# Patient Record
Sex: FEMALE | Race: BLACK OR AFRICAN AMERICAN | Employment: OTHER | URBAN - METROPOLITAN AREA
[De-identification: names, ages, dates, MRNs, and addresses within clinical notes are randomized per-mention and may not be internally consistent; named-entity substitution may affect disease eponyms.]

---

## 2017-01-13 PROCEDURE — 86160 COMPLEMENT ANTIGEN: CPT | Performed by: INTERNAL MEDICINE

## 2017-01-13 PROCEDURE — 82570 ASSAY OF URINE CREATININE: CPT | Performed by: INTERNAL MEDICINE

## 2017-01-13 PROCEDURE — 81001 URINALYSIS AUTO W/SCOPE: CPT | Performed by: INTERNAL MEDICINE

## 2017-01-13 PROCEDURE — 86225 DNA ANTIBODY NATIVE: CPT | Performed by: INTERNAL MEDICINE

## 2017-01-13 PROCEDURE — 87086 URINE CULTURE/COLONY COUNT: CPT | Performed by: INTERNAL MEDICINE

## 2017-01-13 PROCEDURE — 84156 ASSAY OF PROTEIN URINE: CPT | Performed by: INTERNAL MEDICINE

## 2017-04-10 PROCEDURE — 86160 COMPLEMENT ANTIGEN: CPT | Performed by: INTERNAL MEDICINE

## 2017-04-10 PROCEDURE — 86225 DNA ANTIBODY NATIVE: CPT | Performed by: INTERNAL MEDICINE

## 2017-04-10 PROCEDURE — 81001 URINALYSIS AUTO W/SCOPE: CPT | Performed by: INTERNAL MEDICINE

## 2017-04-10 PROCEDURE — 87086 URINE CULTURE/COLONY COUNT: CPT | Performed by: INTERNAL MEDICINE

## 2017-04-10 PROCEDURE — 84156 ASSAY OF PROTEIN URINE: CPT | Performed by: INTERNAL MEDICINE

## 2017-04-10 PROCEDURE — 82570 ASSAY OF URINE CREATININE: CPT | Performed by: INTERNAL MEDICINE

## 2017-05-22 ENCOUNTER — HOSPITAL ENCOUNTER (OUTPATIENT)
Dept: MAMMOGRAPHY | Facility: HOSPITAL | Age: 67
Discharge: HOME | End: 2017-05-22
Attending: SURGERY
Payer: COMMERCIAL

## 2017-05-22 DIAGNOSIS — C50.919 MALIGNANT NEOPLASM OF FEMALE BREAST (HCC): ICD-10-CM

## 2017-05-22 PROCEDURE — 77061 BREAST TOMOSYNTHESIS UNI: CPT | Performed by: SURGERY

## 2017-05-22 PROCEDURE — 77065 DX MAMMO INCL CAD UNI: CPT | Performed by: SURGERY

## 2017-05-31 ENCOUNTER — PRIOR ORIGINAL RECORDS (OUTPATIENT)
Dept: OTHER | Age: 67
End: 2017-05-31

## 2017-06-29 ENCOUNTER — APPOINTMENT (OUTPATIENT)
Dept: LAB | Facility: HOSPITAL | Age: 67
End: 2017-06-29
Payer: COMMERCIAL

## 2017-06-29 ENCOUNTER — LABORATORY ENCOUNTER (OUTPATIENT)
Dept: LAB | Facility: HOSPITAL | Age: 67
End: 2017-06-29
Payer: COMMERCIAL

## 2017-06-29 DIAGNOSIS — R13.19 ESOPHAGEAL DYSPHAGIA: ICD-10-CM

## 2017-06-29 LAB
CHLORIDE: 110 MMOL/L (ref 101–111)
CO2: 27 MMOL/L (ref 22–32)
POTASSIUM SERPL-SCNC: 3.8 MMOL/L (ref 3.6–5.1)
SODIUM SERPL-SCNC: 143 MMOL/L (ref 136–144)

## 2017-06-29 PROCEDURE — 93010 ELECTROCARDIOGRAM REPORT: CPT | Performed by: INTERNAL MEDICINE

## 2017-06-29 PROCEDURE — 93005 ELECTROCARDIOGRAM TRACING: CPT

## 2017-06-29 PROCEDURE — 80051 ELECTROLYTE PANEL: CPT

## 2017-06-29 PROCEDURE — 36591 DRAW BLOOD OFF VENOUS DEVICE: CPT

## 2017-07-12 NOTE — H&P
PRE-PROCEDURE UPDATE    HPI: Sylvia Reyes is a 61year old female. 7/29/1950. Patient presents for an Esophagogastroduodenoscopy.     ALLERGIES:   Benlysta [Belimumab]    Rash, Itching  Iodine Tincture         Nausea and vomiting    Comment:NEEDS Problems with swallowing    • Scleroderma (HCC)    • Sickle cell trait (HCC)    • Sjogren's disease ([facility] [address]    • Stroke ([facility] [address]     \"stroke-like symptoms\"-no residual effects   • Systemic lupus (HCC)     SLE   • Visual impairment     glasses   • Vitamin D de 6/11/2017 4/10/2017   WBC      4.00 - 13.00 10*3/uL   6.26   RBC      3.80 - 5.10 10*6/uL   4.04   Hemoglobin      12.0 - 16.0 g/dL   10.5 (L)   Hematocrit      34.0 - 50.0 %   32.0 (L)   MCV      81.0 - 100.0 fL   79.2 (L)   MCH      27.0 - 33.2 pg   26. 0 renal calculi, with no hydronephrosis appreciated. No focally dilated or thickened loops of bowel are noted. The appendix is unremarkable. There is no radiographically significant abdominal, pelvic, or retroperitoneal lymphadenopathy by CT size criteria.  Elier Hogan benefits and post-op precautions were discussed and questions were answered in the pre-operative area. Cornelius Louis.  Adi COUGHLIN

## 2017-07-13 ENCOUNTER — HOSPITAL ENCOUNTER (OUTPATIENT)
Facility: HOSPITAL | Age: 67
Setting detail: HOSPITAL OUTPATIENT SURGERY
Discharge: HOME | End: 2017-07-13
Attending: INTERNAL MEDICINE | Admitting: INTERNAL MEDICINE
Payer: MEDICARE

## 2017-07-13 ENCOUNTER — ANESTHESIA (OUTPATIENT)
Dept: ENDOSCOPY | Facility: HOSPITAL | Age: 67
End: 2017-07-13
Payer: MEDICARE

## 2017-07-13 ENCOUNTER — ANESTHESIA EVENT (OUTPATIENT)
Dept: ENDOSCOPY | Facility: HOSPITAL | Age: 67
End: 2017-07-13
Payer: MEDICARE

## 2017-07-13 ENCOUNTER — SURGERY (OUTPATIENT)
Age: 67
End: 2017-07-13

## 2017-07-13 VITALS
RESPIRATION RATE: 18 BRPM | BODY MASS INDEX: 27.29 KG/M2 | DIASTOLIC BLOOD PRESSURE: 79 MMHG | HEART RATE: 73 BPM | WEIGHT: 139 LBS | HEIGHT: 60 IN | TEMPERATURE: 98 F | SYSTOLIC BLOOD PRESSURE: 126 MMHG | OXYGEN SATURATION: 98 %

## 2017-07-13 DIAGNOSIS — R13.19 ESOPHAGEAL DYSPHAGIA: Primary | ICD-10-CM

## 2017-07-13 LAB — INR: 1 (ref 0.8–1.3)

## 2017-07-13 PROCEDURE — 0DB68ZX EXCISION OF STOMACH, VIA NATURAL OR ARTIFICIAL OPENING ENDOSCOPIC, DIAGNOSTIC: ICD-10-PCS | Performed by: INTERNAL MEDICINE

## 2017-07-13 PROCEDURE — 0DB78ZX EXCISION OF STOMACH, PYLORUS, VIA NATURAL OR ARTIFICIAL OPENING ENDOSCOPIC, DIAGNOSTIC: ICD-10-PCS | Performed by: INTERNAL MEDICINE

## 2017-07-13 PROCEDURE — 88305 TISSUE EXAM BY PATHOLOGIST: CPT | Performed by: INTERNAL MEDICINE

## 2017-07-13 PROCEDURE — 85610 PROTHROMBIN TIME: CPT | Performed by: INTERNAL MEDICINE

## 2017-07-13 PROCEDURE — 0D748ZZ DILATION OF ESOPHAGOGASTRIC JUNCTION, VIA NATURAL OR ARTIFICIAL OPENING ENDOSCOPIC: ICD-10-PCS | Performed by: INTERNAL MEDICINE

## 2017-07-13 PROCEDURE — 0DB18ZX EXCISION OF UPPER ESOPHAGUS, VIA NATURAL OR ARTIFICIAL OPENING ENDOSCOPIC, DIAGNOSTIC: ICD-10-PCS | Performed by: INTERNAL MEDICINE

## 2017-07-13 RX ORDER — SODIUM CHLORIDE, SODIUM LACTATE, POTASSIUM CHLORIDE, CALCIUM CHLORIDE 600; 310; 30; 20 MG/100ML; MG/100ML; MG/100ML; MG/100ML
INJECTION, SOLUTION INTRAVENOUS CONTINUOUS
Status: DISCONTINUED | OUTPATIENT
Start: 2017-07-13 | End: 2017-07-13

## 2017-07-13 NOTE — ANESTHESIA PREPROCEDURE EVALUATION
PRE-OP EVALUATION    Patient Name: Ochoa Porter    Pre-op Diagnosis: Esophageal dysphagia [R13.14]    Procedure(s):  ESOPHAGOGASTRODUODENOSCOPY WITH DILATION    Surgeon(s) and Role:     * Leia Joshi MD - Primary    Pre-op vitals reviewed. 10 mEq by mouth 2 (two) times daily.  ) Disp: 290 tablet Rfl: 4   Flaxseed, Linseed, (FLAXSEED OIL) 1000 MG Oral Cap Take 1,000 mg by mouth 2 (two) times daily. Disp:  Rfl:    lidocaine (LIDODERM) 5 % External Patch Place 1 patch onto the skin daily.  Disp: reviewed. Exercise tolerance: good     MET: >4      (+) hypertension                                     Endo/Other  Comment: Lupus                   (+) clotting disorder             Pulmonary    Negative pulmonary ROS.                        Neuro/Psych Available pre-op labs reviewed.        Lab Results  Component Value Date    06/29/2017   K 3.8 06/29/2017    06/29/2017   CO2 27.0 06/29/2017       Lab Results  Component Value Date   INR 1.0 07/13/2017   INR 1.9 (A) 07/03/2017         Isha

## 2017-07-13 NOTE — ANESTHESIA POSTPROCEDURE EVALUATION
[facility] Beacher Mcbride Patient Status:  Hospital Outpatient Surgery   Age/Gender 61year old female MRN [id number]   Location [facility]. Attending Ash Gomez MD   Hosp Day # 0 PCP Adolphus Hammans, MD Alisa Collard

## 2017-07-13 NOTE — OPERATIVE REPORT
ENDOSCOPY OPERATIVE REPORT    Patient Name:  Trinh Lacey  Medical Record #: [id number]  YOB: 1950  Date of Procedure: 7/13/2017    Preoperative Diagnosis: Dysphagia    Postoperative Diagnosis:Mild distal esophageal ring    Procedure P were then withdrawn and the balloon was straddled across the area of esophageal narrowing. The balloon was then inflated in a gradual/graded fashion from 18-20 mm, held in place and then deflated and withdrawn.     Upon withdrawl of the endoscope re-examina TOTAL PROTEIN      6.1 - 8.3 g/dL   7.5   Albumin      3.5 - 4.8 g/dL   3.5   Total Bilirubin      0.10 - 2.00 mg/dL   0.29   ALKALINE PHOSPHATASE      50 - 130 U/L   58   AST (SGOT)      15 - 41 U/L   24   ALT (SGPT)      14 - 54 U/L   41   GFR CKD-EPI distal esophageal ring   Not a marked narrowing, now dilated  Dysphagia   If dysphagia continues, then may be related to decreased esophageal motility  IBS    Hx/o gastritis and Hx/o H.  Pylori 8/11   treated and stool test negative 12/11, gastric bx negati

## 2017-07-14 NOTE — PROGRESS NOTES
Here are the biopsy/pathology results from your recent EGD (Upper Intestinal Endoscopy): The biopsies taken of the esophagus were benign and showed no evidence of inflammation or any microscopic abnormalities.    The biopsies taken of the stomach were be

## 2017-07-19 PROCEDURE — 87086 URINE CULTURE/COLONY COUNT: CPT | Performed by: INTERNAL MEDICINE

## 2017-07-19 PROCEDURE — 86160 COMPLEMENT ANTIGEN: CPT | Performed by: INTERNAL MEDICINE

## 2017-07-19 PROCEDURE — 82570 ASSAY OF URINE CREATININE: CPT | Performed by: INTERNAL MEDICINE

## 2017-07-19 PROCEDURE — 86225 DNA ANTIBODY NATIVE: CPT | Performed by: INTERNAL MEDICINE

## 2017-07-19 PROCEDURE — 81001 URINALYSIS AUTO W/SCOPE: CPT | Performed by: INTERNAL MEDICINE

## 2017-07-19 PROCEDURE — 84156 ASSAY OF PROTEIN URINE: CPT | Performed by: INTERNAL MEDICINE

## 2017-08-31 PROBLEM — N20.0 CALCULUS OF KIDNEY: Status: RESOLVED | Noted: 2017-08-31 | Resolved: 2017-08-31

## 2017-09-01 PROBLEM — M32.9 LUPUS ARTHRITIS (HCC): Status: ACTIVE | Noted: 2017-09-01

## 2017-10-17 PROCEDURE — 86160 COMPLEMENT ANTIGEN: CPT | Performed by: INTERNAL MEDICINE

## 2017-10-17 PROCEDURE — 84156 ASSAY OF PROTEIN URINE: CPT | Performed by: INTERNAL MEDICINE

## 2017-10-17 PROCEDURE — 86225 DNA ANTIBODY NATIVE: CPT | Performed by: INTERNAL MEDICINE

## 2017-10-17 PROCEDURE — 81003 URINALYSIS AUTO W/O SCOPE: CPT | Performed by: INTERNAL MEDICINE

## 2017-10-17 PROCEDURE — 82570 ASSAY OF URINE CREATININE: CPT | Performed by: INTERNAL MEDICINE

## 2017-11-07 NOTE — H&P
[facility]    PATIENT'S NAME: Emily Cazares   ATTENDING PHYSICIAN: Carol Zaman M.D.    PATIENT ACCOUNT#:   [de-identified]    LOCATION:    MEDICAL RECORD #:   [id number]       YOB: 1950  ADMISSION DATE:       11/24/2017    HISTORY A involving the inferolateral compartment. She had used a Lidoderm patch, tramadol, and was on warfarin; therefore, I did not think anti-inflammatories would be helpful. I did offer her a knee replacement. I discussed a patellofemoral replacement.   Given dilatation, diagnostic laparoscopy, bilateral breast biopsies, D and C x2, left biopsy for scleroderma, indwelling port.     MEDICATIONS:  Mycophenolate, Lovenox, potassium citrate, famotidine, tramadol, metoprolol, warfarin, lidocaine patch, lidocaine crea CellCept and mycophenolate 7 days prior to surgery. She will hold her Benefiber, folic acid, flaxseed oil, herbs, and Coumadin 14 days prior to surgery. She will be in the hospital 2 days and plans to go to rehab in the postoperative period.   She will se

## 2017-11-09 PROBLEM — G20.A1 PARKINSON DISEASE (HCC): Status: ACTIVE | Noted: 2017-11-09

## 2017-11-09 PROBLEM — G20 PARKINSON DISEASE (HCC): Status: ACTIVE | Noted: 2017-11-09

## 2017-11-09 PROBLEM — M17.12 PRIMARY OSTEOARTHRITIS OF LEFT KNEE: Status: ACTIVE | Noted: 2017-11-09

## 2017-11-09 PROBLEM — G20.A1 PARKINSON DISEASE: Status: ACTIVE | Noted: 2017-11-09

## 2017-11-13 ENCOUNTER — HOSPITAL ENCOUNTER (OUTPATIENT)
Dept: PHYSICAL THERAPY | Facility: HOSPITAL | Age: 67
Discharge: HOME | End: 2017-11-13
Attending: ORTHOPAEDIC SURGERY
Payer: MEDICARE

## 2017-11-13 ENCOUNTER — LABORATORY ENCOUNTER (OUTPATIENT)
Dept: LAB | Facility: HOSPITAL | Age: 67
End: 2017-11-13
Payer: MEDICARE

## 2017-11-13 ENCOUNTER — APPOINTMENT (OUTPATIENT)
Dept: LAB | Facility: HOSPITAL | Age: 67
End: 2017-11-13
Attending: ORTHOPAEDIC SURGERY
Payer: MEDICARE

## 2017-11-13 DIAGNOSIS — M17.12 OSTEOARTHRITIS OF LEFT KNEE: ICD-10-CM

## 2017-11-13 PROCEDURE — 36591 DRAW BLOOD OFF VENOUS DEVICE: CPT

## 2017-11-13 PROCEDURE — 87081 CULTURE SCREEN ONLY: CPT

## 2017-11-13 PROCEDURE — 86850 RBC ANTIBODY SCREEN: CPT

## 2017-11-13 PROCEDURE — 86901 BLOOD TYPING SEROLOGIC RH(D): CPT

## 2017-11-13 PROCEDURE — 86900 BLOOD TYPING SEROLOGIC ABO: CPT

## 2017-11-14 PROBLEM — Z85.3 HISTORY OF BREAST CANCER: Status: ACTIVE | Noted: 2017-11-14

## 2017-11-14 PROBLEM — M17.12 OSTEOARTHRITIS OF LEFT KNEE, UNSPECIFIED OSTEOARTHRITIS TYPE: Status: ACTIVE | Noted: 2017-11-09

## 2017-11-15 ENCOUNTER — PRIOR ORIGINAL RECORDS (OUTPATIENT)
Dept: OTHER | Age: 67
End: 2017-11-15

## 2017-11-22 PROBLEM — F51.04 PSYCHOPHYSIOLOGICAL INSOMNIA: Status: ACTIVE | Noted: 2017-11-22

## 2017-11-22 PROBLEM — G20.C PARKINSONISM, UNSPECIFIED PARKINSONISM TYPE: Status: ACTIVE | Noted: 2017-11-22

## 2017-11-22 PROBLEM — G20.C PARKINSONISM, UNSPECIFIED PARKINSONISM TYPE (HCC): Status: ACTIVE | Noted: 2017-11-22

## 2017-11-22 PROBLEM — G20 PARKINSONISM, UNSPECIFIED PARKINSONISM TYPE (HCC): Status: ACTIVE | Noted: 2017-11-22

## 2017-11-22 PROBLEM — R51.9 CHRONIC DAILY HEADACHE: Status: ACTIVE | Noted: 2017-11-22

## 2017-11-24 ENCOUNTER — APPOINTMENT (OUTPATIENT)
Dept: GENERAL RADIOLOGY | Facility: HOSPITAL | Age: 67
DRG: 470 | End: 2017-11-24
Attending: ORTHOPAEDIC SURGERY
Payer: MEDICARE

## 2017-11-24 ENCOUNTER — HOSPITAL ENCOUNTER (INPATIENT)
Facility: HOSPITAL | Age: 67
LOS: 3 days | Discharge: NURSING, REHAB OR LONG-TERM CARE | DRG: 470 | End: 2017-11-27
Attending: ORTHOPAEDIC SURGERY | Admitting: ORTHOPAEDIC SURGERY
Payer: MEDICARE

## 2017-11-24 ENCOUNTER — SURGERY (OUTPATIENT)
Age: 67
End: 2017-11-24

## 2017-11-24 ENCOUNTER — ANESTHESIA EVENT (OUTPATIENT)
Dept: SURGERY | Facility: HOSPITAL | Age: 67
DRG: 470 | End: 2017-11-24
Payer: MEDICARE

## 2017-11-24 ENCOUNTER — ANESTHESIA (OUTPATIENT)
Dept: SURGERY | Facility: HOSPITAL | Age: 67
DRG: 470 | End: 2017-11-24
Payer: MEDICARE

## 2017-11-24 DIAGNOSIS — M17.12 OSTEOARTHRITIS OF LEFT KNEE: Primary | ICD-10-CM

## 2017-11-24 DIAGNOSIS — D68.59 HYPERCOAGULABLE STATE (HCC): ICD-10-CM

## 2017-11-24 PROCEDURE — 73560 X-RAY EXAM OF KNEE 1 OR 2: CPT | Performed by: ORTHOPAEDIC SURGERY

## 2017-11-24 PROCEDURE — 97116 GAIT TRAINING THERAPY: CPT

## 2017-11-24 PROCEDURE — 3E0T3BZ INTRODUCTION OF ANESTHETIC AGENT INTO PERIPHERAL NERVES AND PLEXI, PERCUTANEOUS APPROACH: ICD-10-PCS | Performed by: STUDENT IN AN ORGANIZED HEALTH CARE EDUCATION/TRAINING PROGRAM

## 2017-11-24 PROCEDURE — 88305 TISSUE EXAM BY PATHOLOGIST: CPT | Performed by: ORTHOPAEDIC SURGERY

## 2017-11-24 PROCEDURE — 82565 ASSAY OF CREATININE: CPT | Performed by: PHYSICIAN ASSISTANT

## 2017-11-24 PROCEDURE — 0SRD0J9 REPLACEMENT OF LEFT KNEE JOINT WITH SYNTHETIC SUBSTITUTE, CEMENTED, OPEN APPROACH: ICD-10-PCS | Performed by: ORTHOPAEDIC SURGERY

## 2017-11-24 PROCEDURE — 88311 DECALCIFY TISSUE: CPT | Performed by: ORTHOPAEDIC SURGERY

## 2017-11-24 PROCEDURE — 76942 ECHO GUIDE FOR BIOPSY: CPT | Performed by: ORTHOPAEDIC SURGERY

## 2017-11-24 PROCEDURE — 97162 PT EVAL MOD COMPLEX 30 MIN: CPT

## 2017-11-24 PROCEDURE — 85610 PROTHROMBIN TIME: CPT

## 2017-11-24 DEVICE — PSN FEM PS CMT CCR STD SZ5 L: Type: IMPLANTABLE DEVICE | Site: KNEE | Status: FUNCTIONAL

## 2017-11-24 DEVICE — CEMENT BONE ZIM PALICOS R: Type: IMPLANTABLE DEVICE | Site: KNEE | Status: FUNCTIONAL

## 2017-11-24 DEVICE — PSN STR HYB ST 14X+30 M: Type: IMPLANTABLE DEVICE | Site: KNEE | Status: FUNCTIONAL

## 2017-11-24 DEVICE — ALL POLY PAT VE 32MM: Type: IMPLANTABLE DEVICE | Site: KNEE | Status: FUNCTIONAL

## 2017-11-24 DEVICE — PSN TIB STM 5 DEG SZ C L: Type: IMPLANTABLE DEVICE | Site: KNEE | Status: FUNCTIONAL

## 2017-11-24 RX ORDER — TRAMADOL HYDROCHLORIDE 50 MG/1
50 TABLET ORAL EVERY 6 HOURS
Status: DISCONTINUED | OUTPATIENT
Start: 2017-11-24 | End: 2017-11-26

## 2017-11-24 RX ORDER — FAMOTIDINE 20 MG/1
40 TABLET ORAL DAILY
Status: DISCONTINUED | OUTPATIENT
Start: 2017-11-24 | End: 2017-11-27

## 2017-11-24 RX ORDER — MYCOPHENOLATE MOFETIL 250 MG/1
1500 CAPSULE ORAL 2 TIMES DAILY
Status: DISCONTINUED | OUTPATIENT
Start: 2017-11-24 | End: 2017-11-25

## 2017-11-24 RX ORDER — OXYCODONE HYDROCHLORIDE 5 MG/1
5 TABLET ORAL EVERY 4 HOURS PRN
Status: ACTIVE | OUTPATIENT
Start: 2017-11-24 | End: 2017-11-26

## 2017-11-24 RX ORDER — SODIUM PHOSPHATE, DIBASIC AND SODIUM PHOSPHATE, MONOBASIC 7; 19 G/133ML; G/133ML
1 ENEMA RECTAL ONCE AS NEEDED
Status: DISCONTINUED | OUTPATIENT
Start: 2017-11-24 | End: 2017-11-27

## 2017-11-24 RX ORDER — DOCUSATE SODIUM 100 MG/1
100 CAPSULE, LIQUID FILLED ORAL 2 TIMES DAILY
Status: DISCONTINUED | OUTPATIENT
Start: 2017-11-24 | End: 2017-11-27

## 2017-11-24 RX ORDER — CLINDAMYCIN PHOSPHATE 900 MG/50ML
900 INJECTION INTRAVENOUS ONCE
Status: DISCONTINUED | OUTPATIENT
Start: 2017-11-24 | End: 2017-11-24 | Stop reason: HOSPADM

## 2017-11-24 RX ORDER — DIPHENHYDRAMINE HYDROCHLORIDE 50 MG/ML
12.5 INJECTION INTRAMUSCULAR; INTRAVENOUS AS NEEDED
Status: DISCONTINUED | OUTPATIENT
Start: 2017-11-24 | End: 2017-11-24 | Stop reason: HOSPADM

## 2017-11-24 RX ORDER — HYDROMORPHONE HYDROCHLORIDE 1 MG/ML
0.2 INJECTION, SOLUTION INTRAMUSCULAR; INTRAVENOUS; SUBCUTANEOUS EVERY 2 HOUR PRN
Status: ACTIVE | OUTPATIENT
Start: 2017-11-24 | End: 2017-11-26

## 2017-11-24 RX ORDER — OXYCODONE HYDROCHLORIDE 10 MG/1
10 TABLET ORAL EVERY 4 HOURS PRN
Status: DISPENSED | OUTPATIENT
Start: 2017-11-24 | End: 2017-11-26

## 2017-11-24 RX ORDER — MEPERIDINE HYDROCHLORIDE 25 MG/ML
INJECTION INTRAMUSCULAR; INTRAVENOUS; SUBCUTANEOUS
Status: COMPLETED
Start: 2017-11-24 | End: 2017-11-24

## 2017-11-24 RX ORDER — METOPROLOL SUCCINATE 50 MG/1
50 TABLET, EXTENDED RELEASE ORAL
Status: DISCONTINUED | OUTPATIENT
Start: 2017-11-25 | End: 2017-11-27

## 2017-11-24 RX ORDER — OXYCODONE HYDROCHLORIDE 15 MG/1
15 TABLET ORAL EVERY 4 HOURS PRN
Status: ACTIVE | OUTPATIENT
Start: 2017-11-24 | End: 2017-11-26

## 2017-11-24 RX ORDER — DIPHENHYDRAMINE HYDROCHLORIDE 50 MG/ML
12.5 INJECTION INTRAMUSCULAR; INTRAVENOUS EVERY 4 HOURS PRN
Status: DISCONTINUED | OUTPATIENT
Start: 2017-11-24 | End: 2017-11-27

## 2017-11-24 RX ORDER — HYDROMORPHONE HYDROCHLORIDE 1 MG/ML
0.4 INJECTION, SOLUTION INTRAMUSCULAR; INTRAVENOUS; SUBCUTANEOUS EVERY 2 HOUR PRN
Status: DISPENSED | OUTPATIENT
Start: 2017-11-24 | End: 2017-11-26

## 2017-11-24 RX ORDER — CYCLOBENZAPRINE HCL 5 MG
5 TABLET ORAL 3 TIMES DAILY PRN
Status: DISCONTINUED | OUTPATIENT
Start: 2017-11-24 | End: 2017-11-27

## 2017-11-24 RX ORDER — OXYCODONE HCL 10 MG/1
10 TABLET, FILM COATED, EXTENDED RELEASE ORAL
Status: COMPLETED | OUTPATIENT
Start: 2017-11-24 | End: 2017-11-24

## 2017-11-24 RX ORDER — HYDROCODONE BITARTRATE AND ACETAMINOPHEN 10; 325 MG/1; MG/1
1-2 TABLET ORAL EVERY 6 HOURS PRN
Qty: 80 TABLET | Refills: 0 | Status: SHIPPED | OUTPATIENT
Start: 2017-11-24 | End: 2017-12-07 | Stop reason: ALTCHOICE

## 2017-11-24 RX ORDER — HYDROMORPHONE HYDROCHLORIDE 1 MG/ML
0.8 INJECTION, SOLUTION INTRAMUSCULAR; INTRAVENOUS; SUBCUTANEOUS EVERY 2 HOUR PRN
Status: DISPENSED | OUTPATIENT
Start: 2017-11-24 | End: 2017-11-26

## 2017-11-24 RX ORDER — SODIUM CHLORIDE, SODIUM LACTATE, POTASSIUM CHLORIDE, CALCIUM CHLORIDE 600; 310; 30; 20 MG/100ML; MG/100ML; MG/100ML; MG/100ML
INJECTION, SOLUTION INTRAVENOUS CONTINUOUS
Status: DISCONTINUED | OUTPATIENT
Start: 2017-11-24 | End: 2017-11-27

## 2017-11-24 RX ORDER — NALOXONE HYDROCHLORIDE 0.4 MG/ML
80 INJECTION, SOLUTION INTRAMUSCULAR; INTRAVENOUS; SUBCUTANEOUS AS NEEDED
Status: DISCONTINUED | OUTPATIENT
Start: 2017-11-24 | End: 2017-11-24 | Stop reason: HOSPADM

## 2017-11-24 RX ORDER — POLYETHYLENE GLYCOL 3350 17 G/17G
17 POWDER, FOR SOLUTION ORAL DAILY PRN
Status: DISCONTINUED | OUTPATIENT
Start: 2017-11-24 | End: 2017-11-27

## 2017-11-24 RX ORDER — ACETAMINOPHEN 325 MG/1
650 TABLET ORAL 4 TIMES DAILY
Status: DISPENSED | OUTPATIENT
Start: 2017-11-24 | End: 2017-11-26

## 2017-11-24 RX ORDER — OXYCODONE HCL 10 MG/1
10 TABLET, FILM COATED, EXTENDED RELEASE ORAL
Status: COMPLETED | OUTPATIENT
Start: 2017-11-24 | End: 2017-11-25

## 2017-11-24 RX ORDER — ACETAMINOPHEN 325 MG/1
TABLET ORAL
Status: COMPLETED
Start: 2017-11-24 | End: 2017-11-24

## 2017-11-24 RX ORDER — ZOLPIDEM TARTRATE 5 MG/1
5 TABLET ORAL NIGHTLY PRN
Status: DISCONTINUED | OUTPATIENT
Start: 2017-11-24 | End: 2017-11-27

## 2017-11-24 RX ORDER — HYDRALAZINE HYDROCHLORIDE 10 MG/1
10 TABLET, FILM COATED ORAL 2 TIMES DAILY
Status: DISCONTINUED | OUTPATIENT
Start: 2017-11-24 | End: 2017-11-25

## 2017-11-24 RX ORDER — MELATONIN
325
Status: DISCONTINUED | OUTPATIENT
Start: 2017-11-25 | End: 2017-11-27

## 2017-11-24 RX ORDER — METOCLOPRAMIDE HYDROCHLORIDE 5 MG/ML
10 INJECTION INTRAMUSCULAR; INTRAVENOUS EVERY 6 HOURS PRN
Status: DISPENSED | OUTPATIENT
Start: 2017-11-24 | End: 2017-11-26

## 2017-11-24 RX ORDER — SCOLOPAMINE TRANSDERMAL SYSTEM 1 MG/1
1 PATCH, EXTENDED RELEASE TRANSDERMAL ONCE
Status: DISCONTINUED | OUTPATIENT
Start: 2017-11-24 | End: 2017-11-24 | Stop reason: HOSPADM

## 2017-11-24 RX ORDER — ACETAMINOPHEN 325 MG/1
650 TABLET ORAL ONCE
Status: COMPLETED | OUTPATIENT
Start: 2017-11-24 | End: 2017-11-24

## 2017-11-24 RX ORDER — GABAPENTIN 300 MG/1
300 CAPSULE ORAL NIGHTLY
Status: DISCONTINUED | OUTPATIENT
Start: 2017-11-24 | End: 2017-11-27

## 2017-11-24 RX ORDER — MEPERIDINE HYDROCHLORIDE 25 MG/ML
12.5 INJECTION INTRAMUSCULAR; INTRAVENOUS; SUBCUTANEOUS AS NEEDED
Status: COMPLETED | OUTPATIENT
Start: 2017-11-24 | End: 2017-11-24

## 2017-11-24 RX ORDER — DIPHENHYDRAMINE HCL 25 MG
25 CAPSULE ORAL EVERY 4 HOURS PRN
Status: DISCONTINUED | OUTPATIENT
Start: 2017-11-24 | End: 2017-11-27

## 2017-11-24 RX ORDER — HYDROMORPHONE HYDROCHLORIDE 1 MG/ML
0.4 INJECTION, SOLUTION INTRAMUSCULAR; INTRAVENOUS; SUBCUTANEOUS EVERY 5 MIN PRN
Status: DISCONTINUED | OUTPATIENT
Start: 2017-11-24 | End: 2017-11-24 | Stop reason: HOSPADM

## 2017-11-24 RX ORDER — DEXTROSE AND SODIUM CHLORIDE 5; .45 G/100ML; G/100ML
INJECTION, SOLUTION INTRAVENOUS CONTINUOUS
Status: DISCONTINUED | OUTPATIENT
Start: 2017-11-24 | End: 2017-11-27

## 2017-11-24 RX ORDER — HYDROMORPHONE HYDROCHLORIDE 1 MG/ML
INJECTION, SOLUTION INTRAMUSCULAR; INTRAVENOUS; SUBCUTANEOUS
Status: COMPLETED
Start: 2017-11-24 | End: 2017-11-24

## 2017-11-24 RX ORDER — DIPHENHYDRAMINE HYDROCHLORIDE 50 MG/ML
25 INJECTION INTRAMUSCULAR; INTRAVENOUS ONCE AS NEEDED
Status: ACTIVE | OUTPATIENT
Start: 2017-11-24 | End: 2017-11-24

## 2017-11-24 RX ORDER — CLINDAMYCIN PHOSPHATE 900 MG/50ML
900 INJECTION INTRAVENOUS EVERY 8 HOURS
Status: COMPLETED | OUTPATIENT
Start: 2017-11-24 | End: 2017-11-25

## 2017-11-24 RX ORDER — MIDAZOLAM HYDROCHLORIDE 1 MG/ML
1 INJECTION INTRAMUSCULAR; INTRAVENOUS EVERY 5 MIN PRN
Status: DISCONTINUED | OUTPATIENT
Start: 2017-11-24 | End: 2017-11-24 | Stop reason: HOSPADM

## 2017-11-24 RX ORDER — BISACODYL 10 MG
10 SUPPOSITORY, RECTAL RECTAL
Status: DISCONTINUED | OUTPATIENT
Start: 2017-11-24 | End: 2017-11-27

## 2017-11-24 NOTE — ANESTHESIA PREPROCEDURE EVALUATION
PRE-OP EVALUATION    Patient Name: Lang Taylor    Pre-op Diagnosis: OSTEOARTHRITIS LEFT KNEE    Procedure(s):  LEFT TOTAL KNEE ARTHROPLASTY    Surgeon(s) and Role:     Adelia Jose MD - Primary    Pre-op vitals reviewed.         Body mass index hydrALAzine HCl 10 MG Oral Tab Take 1 tablet (10 mg total) by mouth 2 (two) times daily. Disp: 180 tablet Rfl: 3   [DISCONTINUED] Potassium Citrate ER (UROCIT-K 10) 10 MEQ (1080 MG) Oral Tab CR Take 1 tablet (10 mEq total) by mouth 3 (three) times daily. reviewed. Anesthetic Complications  (-) history of anesthetic complications         GI/Hepatic/Renal      (+) GERD                           Cardiovascular      ECG reviewed.   Exercise tolerance: good     MET: >4      (+) hypertension disease  4/30/12: UPPER GI ENDOSCOPY,DIAGNOSIS      Comment: wnl, gastric bx benign and negative for H.                pylori  07/13/2017: UPPER GI ENDOSCOPY,DIAGNOSIS      Comment: mild esophageal ring, dilated 18-20 mm,                proximal esophageal

## 2017-11-24 NOTE — ANESTHESIA POSTPROCEDURE EVALUATION
[facility]    Gilford Muller Patient Status:  Surgery Admit   Age/Gender 74year old female MRN [id number]   [facility] SURGERY Attending Dipika Turner MD   Hosp Day # 0 PCP Patriica Last MD       Anesthesia Post-op Note    P

## 2017-11-24 NOTE — H&P
Pre-op Diagnosis: OSTEOARTHRITIS LEFT KNEE    The above referenced H&P was reviewed by DOMINIK Villareal on 11/24/2017, the patient was examined and no significant changes have occurred in the patient's condition since the H&P was performed.   I discussed w

## 2017-11-24 NOTE — BRIEF OP NOTE
Pre-Operative Diagnosis: OSTEOARTHRITIS LEFT KNEE     Post-Operative Diagnosis: * No post-op diagnosis entered *     Procedure Performed:   Procedure(s):  LEFT TOTAL KNEE ARTHROPLASTY    Surgeon(s) and Role:     Roger Vasquez MD - Primary    Assista

## 2017-11-24 NOTE — PHYSICAL THERAPY NOTE
PHYSICAL THERAPY KNEE EVALUATION - INPATIENT     Room Number: 379/379-A  Evaluation Date: 11/24/2017  Type of Evaluation: Initial  Physician Order: PT Eval and Treat    Presenting Problem: s/p left TKA 11/24/17  Reason for Therapy: Mobility Dysfunction and knee  1991: CHEMOTHERAPY  8/8/11: COLONOSCOPY,DIAGNOSTIC      Comment: Dr. Nalini Thomason, wnl  12/9/2010: CYSTOURETHROSCOPY      Comment: Performed by Donald Wilkinson at [address]  No date: D & C      Comment: x 2  12/9/2010: Clarisa Camacho needed for pain, reports ind for adls. Pt lives with retired spouse.       SUBJECTIVE  \"You know I have trouble moving\"  \"My pain at home is usually a 8.2, now its a 8.5\"    Patient self-stated goal is to have less pain    OBJECTIVE  Precautions: Knee Standardized Score (AM-PAC Scale): 35.33   CMS Modifier (G-Code): CL    FUNCTIONAL ABILITY STATUS  Gait Assessment   Gait Assistance: Dependent assistance  Distance (ft): 3  Assistive Device: Rolling walker  Pattern: L Decreased stance time  Stoop/Curb A evaluation complexity is considered moderate. These impairments and comorbidities manifest themselves as functional limitations in independent bed mobility, transfers, and gait.   The patient is below baseline and would benefit from skilled inpatient PT to

## 2017-11-25 PROCEDURE — 97535 SELF CARE MNGMENT TRAINING: CPT

## 2017-11-25 PROCEDURE — 97166 OT EVAL MOD COMPLEX 45 MIN: CPT

## 2017-11-25 PROCEDURE — 97116 GAIT TRAINING THERAPY: CPT

## 2017-11-25 PROCEDURE — 97150 GROUP THERAPEUTIC PROCEDURES: CPT

## 2017-11-25 PROCEDURE — 85610 PROTHROMBIN TIME: CPT | Performed by: INTERNAL MEDICINE

## 2017-11-25 PROCEDURE — 85027 COMPLETE CBC AUTOMATED: CPT | Performed by: PHYSICIAN ASSISTANT

## 2017-11-25 RX ORDER — HYDROCODONE BITARTRATE AND ACETAMINOPHEN 10; 325 MG/1; MG/1
1 TABLET ORAL EVERY 4 HOURS PRN
Status: DISCONTINUED | OUTPATIENT
Start: 2017-11-26 | End: 2017-11-26

## 2017-11-25 RX ORDER — HYDROCODONE BITARTRATE AND ACETAMINOPHEN 10; 325 MG/1; MG/1
2 TABLET ORAL EVERY 4 HOURS PRN
Status: DISCONTINUED | OUTPATIENT
Start: 2017-11-26 | End: 2017-11-26

## 2017-11-25 RX ORDER — ENOXAPARIN SODIUM 100 MG/ML
1 INJECTION SUBCUTANEOUS EVERY 12 HOURS SCHEDULED
Status: DISCONTINUED | OUTPATIENT
Start: 2017-11-25 | End: 2017-11-27

## 2017-11-25 RX ORDER — HYDRALAZINE HYDROCHLORIDE 25 MG/1
25 TABLET, FILM COATED ORAL EVERY 8 HOURS SCHEDULED
Status: DISCONTINUED | OUTPATIENT
Start: 2017-11-25 | End: 2017-11-26

## 2017-11-25 NOTE — CONSULTS
[facility] Dosing Service  Warfarin (Coumadin) Initial Dosing    Keyla Hill is a 74year old female for whom pharmacy has been consulted to dose warfarin (COUMADIN) for hypercoagulable disorder  by Dr. Daniel Dominguez.   Based on this indication, goal IN

## 2017-11-25 NOTE — CM/SW NOTE
11/25/17 1200   CM/SW Referral Data   Referral Source Physician   Reason for Referral Discharge planning   Informant Patient;Edward Staff   Pertinent Medical Hx   Primary Care Physician Name dr Rigoberto Bishop

## 2017-11-25 NOTE — PROGRESS NOTES
[address] [facility],[address] Patient Status:  Inpatient    1950 MRN [id number]   [facility] 3SW-A Attending Ana Guillermo MD   Hosp Day # 1 PCP MD Andrzej Simpsonreshma Milesleatha is a 74year old female patient. Decreased vision    • Deep vein thrombosis (HCC)     blood clots in mouth after dental procedure   • Dysuria     chronic dysuria   • Esophageal reflux    • Essential hypertension    • Fibromyalgia    • Gastrointestinal disorder    • Headache    • Helicobac Hives  Penicillins                 Comment:Throat closes/swells, hives  Purinethol [Mercapt*    Rash  Sulfa Antibiotics           Comment:Throat closes/swells, rash, hives  Tetanus Toxoid-Diph*        Comment:Vomiting severe swelling at injection site  Tet

## 2017-11-25 NOTE — CONSULTS
General Medicine Consult      Reason for consult: post-op medical management     Consulted by: Dr. Duke Liz    PCP: Darren Gomez MD      History of Present Illness:   Patient is a 74year old female with PMH sig for SLE, osteopenia, fibromyalgia, hyper symptoms\"-no residual effects; no diagnostic findings several years ago   • Systemic lupus ([facility] [address]     SLE   • Visual impairment     glasses   • Vitamin D deficiency         PSH:  Past Surgical History:  No date: ARTHROSCOPY OF JOINT UNLISTED Left      Comm 11/24/17 encounter [facility] Encounter): HYDROcodone-acetaminophen  MG Oral Tab Take 1-2 tablets by mouth every 6 (six) hours as needed.  Disp: 80 tablet Rfl: 0   Mycophenolate Mofetil 500 MG Oral Tab Take 3 tablets (1,500 mg total) by mouth 2 (two) Disp: 180 tablet Rfl: 3   [DISCONTINUED] Potassium Citrate ER (UROCIT-K 10) 10 MEQ (1080 MG) Oral Tab CR Take 1 tablet (10 mEq total) by mouth 3 (three) times daily.  (Patient taking differently: Take 10 mEq by mouth 2 (two) times daily.  ) Disp: 290 tablet (11/24/17 1108)     PRN Medication:[START ON 11/26/2017] HYDROcodone-acetaminophen **OR** [START ON 11/26/2017] HYDROcodone-acetaminophen, Zolpidem Tartrate, PEG 3350, bisacodyl, FLEET ENEMA, Metoclopramide HCl, Prochlorperazine Edisylate, diphenhydrAMINE Gastro-Intestinal Disorder Self    • Other [OTHER] Self      lupus/parkinson's       Review of Systems  Comprehensive ROS reviewed and negative except for what's stated above. Including negative for chest pain, shortness of breath, syncope.        OBJECTIV surrounding soft tissues with surgical clips anteriorly. CONCLUSION:  Postoperative changes of left total knee arthroplasty as above.     Dictated by: Dawn Goodpasture, MD on 11/24/2017 at 10:42     Approved by: Dawn Goodpasture, MD            Xr Knee (ap

## 2017-11-25 NOTE — PHYSICAL THERAPY NOTE
PHYSICAL THERAPY TREATMENT NOTE - INPATIENT    Room Number: 767/459-N     Session: 2 + 3   Number of Visits to Meet Established Goals: 4    Presenting Problem: s/p left TKA 11/24/17    Problem List  Active Problems:    Osteoarthritis of left knee      Pas FRAGMENTING OF KIDNEY STONE      Comment: Performed by Padmini Ashley at [address]  No date: HYSTERECTOMY  No date: LAPAROSCOPY,DIAGNOSTIC  8783,4329: FREDDIE NEEDLE LOCALIZATION W/ SPECIMEN 1 SITE LEFT  No date: MASTEC,MOD RADIC Static Sitting: Fair +  Dynamic Sitting: Fair +           Static Standing: Poor +  Dynamic Standing: Poor +    ACTIVITY TOLERANCE  Room air  No shortness of breath    AM-PAC '6- flexion range of motion initially from 45 degrees passively to 79 degrees by end of session. Pt reported 9/10 fatigue and pain level at end of 50 ft ambulation with chair following, requiring Min A with RW.      PM session:   Patient participated in therape decrease fall risk. DISCHARGE RECOMMENDATIONS  PT Discharge Recommendations: Acute rehabilitation     PLAN  PT Treatment Plan: Bed mobility; Energy conservation; Endurance; Patient education;Gait training;Range of motion;Strengthening;Stair training;Stoop

## 2017-11-26 ENCOUNTER — APPOINTMENT (OUTPATIENT)
Dept: ULTRASOUND IMAGING | Facility: HOSPITAL | Age: 67
DRG: 470 | End: 2017-11-26
Attending: PHYSICIAN ASSISTANT
Payer: MEDICARE

## 2017-11-26 PROCEDURE — 85027 COMPLETE CBC AUTOMATED: CPT | Performed by: PHYSICIAN ASSISTANT

## 2017-11-26 PROCEDURE — 97530 THERAPEUTIC ACTIVITIES: CPT

## 2017-11-26 PROCEDURE — 97110 THERAPEUTIC EXERCISES: CPT

## 2017-11-26 PROCEDURE — 93971 EXTREMITY STUDY: CPT | Performed by: PHYSICIAN ASSISTANT

## 2017-11-26 PROCEDURE — 93005 ELECTROCARDIOGRAM TRACING: CPT

## 2017-11-26 PROCEDURE — 85610 PROTHROMBIN TIME: CPT | Performed by: INTERNAL MEDICINE

## 2017-11-26 PROCEDURE — 93010 ELECTROCARDIOGRAM REPORT: CPT | Performed by: INTERNAL MEDICINE

## 2017-11-26 PROCEDURE — 97150 GROUP THERAPEUTIC PROCEDURES: CPT

## 2017-11-26 PROCEDURE — 97535 SELF CARE MNGMENT TRAINING: CPT

## 2017-11-26 RX ORDER — HYDRALAZINE HYDROCHLORIDE 50 MG/1
50 TABLET, FILM COATED ORAL EVERY 8 HOURS SCHEDULED
Status: DISCONTINUED | OUTPATIENT
Start: 2017-11-26 | End: 2017-11-27

## 2017-11-26 RX ORDER — HYDROCODONE BITARTRATE AND ACETAMINOPHEN 5; 325 MG/1; MG/1
1 TABLET ORAL EVERY 4 HOURS PRN
Status: DISCONTINUED | OUTPATIENT
Start: 2017-11-26 | End: 2017-11-27

## 2017-11-26 RX ORDER — TRAMADOL HYDROCHLORIDE 50 MG/1
50 TABLET ORAL EVERY 6 HOURS PRN
Status: DISCONTINUED | OUTPATIENT
Start: 2017-11-26 | End: 2017-11-27

## 2017-11-26 RX ORDER — SODIUM CHLORIDE 9 MG/ML
INJECTION, SOLUTION INTRAVENOUS CONTINUOUS
Status: ACTIVE | OUTPATIENT
Start: 2017-11-26 | End: 2017-11-26

## 2017-11-26 RX ORDER — METOCLOPRAMIDE HYDROCHLORIDE 5 MG/ML
10 INJECTION INTRAMUSCULAR; INTRAVENOUS EVERY 6 HOURS PRN
Status: DISCONTINUED | OUTPATIENT
Start: 2017-11-26 | End: 2017-11-27

## 2017-11-26 RX ORDER — HYDRALAZINE HYDROCHLORIDE 25 MG/1
25 TABLET, FILM COATED ORAL EVERY 8 HOURS SCHEDULED
Qty: 90 TABLET | Refills: 0 | Status: SHIPPED | OUTPATIENT
Start: 2017-11-26 | End: 2017-11-30

## 2017-11-26 RX ORDER — HYDROCODONE BITARTRATE AND ACETAMINOPHEN 5; 325 MG/1; MG/1
2 TABLET ORAL EVERY 4 HOURS PRN
Status: DISCONTINUED | OUTPATIENT
Start: 2017-11-26 | End: 2017-11-27

## 2017-11-26 RX ORDER — LABETALOL HYDROCHLORIDE 5 MG/ML
10 INJECTION, SOLUTION INTRAVENOUS EVERY 6 HOURS PRN
Status: DISCONTINUED | OUTPATIENT
Start: 2017-11-26 | End: 2017-11-27

## 2017-11-26 RX ORDER — ENOXAPARIN SODIUM 100 MG/ML
60 INJECTION SUBCUTANEOUS EVERY 12 HOURS SCHEDULED
Qty: 14 SYRINGE | Refills: 0 | Status: SHIPPED | OUTPATIENT
Start: 2017-11-26 | End: 2017-12-01

## 2017-11-26 NOTE — OCCUPATIONAL THERAPY NOTE
OCCUPATIONAL THERAPY TREATMENT NOTE - INPATIENT     Room Number: 036/852-F  Session: 1   Number of Visits to Meet Established Goals: 5         History related to current admission: Pt is 74year old female admitted on 11/24/2017 from home.   Pt is s/p L TKA CHEMOTHERAPY  8/8/11: COLONOSCOPY,DIAGNOSTIC      Comment: Dr. Shawn Sharpe, wnl  12/9/2010: CYSTOURETHROSCOPY      Comment: Performed by Landry Gitelman at [address]  No date: D & C      Comment: x 2  12/9/2010: Sixto Roy 9  Location: L knee  Management Techniques:  Activity promotion;Repositioning (per pt, pt had pain meds prior to session)     ACTIVITY TOLERANCE  Room air  No shortness of breath    ACTIVITIES OF DAILY LIVING ASSESSMENT  AM-PAC ‘6-Clicks’ Inpatient Daily Ac patient is below baseline and would benefit from skilled inpatient OT to address the above deficits, maximizing patient's ability to return to prior level of function.     OT Discharge Recommendations: Acute rehabilitation  OT Device Recommendations: TBD

## 2017-11-26 NOTE — PROGRESS NOTES
Rn notified Dr. Sanchez Montgomery of patients elevated blood pressure continuing this afternoon despite pain med admin. Patient denies any chest pain or discomfort. No headaches. Patients scheduled hydralazine was increased and additional dose given this evening.  P

## 2017-11-26 NOTE — PHYSICAL THERAPY NOTE
PHYSICAL THERAPY TREATMENT NOTE - INPATIENT    Room Number: 494/092-U     Session: 4  Number of Visits to Meet Established Goals: 3    Presenting Problem: s/p L TKA 11/24/17    Problem List  Active Problems:    Osteoarthritis of left knee    Chart review Performed by Pillo Levy at [facility]  No date: D & C      Comment: x 2  12/9/2010: FRAGMENTING OF KIDNEY STONE      Comment: Performed by Pillo Levy at [facility]  No date: HYSTERECTOMY Techniques: Activity promotion; Body mechanics; Relaxation;Repositioning    BALANCE                                                                                                                     Static Sitting: Fair +  Dynamic Sitting: Fair + group session.      Exercises AM Session    Ankle Pumps 20 reps    Quad Sets 20 reps    Glut Sets 20 reps    Hip Abd/Add 20 reps    Heel slides 20 reps    Saq 20 reps    SLR 20 reps    Sitting Knee Flexion 20 reps    Standing heel/toe raises 20 reps    Duncan Mccollum conservation; Family education;Gait training;Neuromuscular re-educate;Strengthening;Range of motion;Stoop training;Stair training;Transfer training;Balance training  Rehab Potential : Fair  Frequency (Obs): BID    CURRENT GOALS   Goal #1  Patient is able to

## 2017-11-26 NOTE — PROGRESS NOTES
Pt bp elevated, per im md gave iv prn med, will continue to monitor. Pain controlled on pain meds. Pt c/o nausea, given antiemetics. Safety precautions in place, will continue to monitor. 1800 bp now at 054 systolic.

## 2017-11-26 NOTE — PROGRESS NOTES
Patient c/o feeling dizzy and nauseated when up this am. B/p stable. HR noted tachy . Patient denies SOB and denies chest pain. ID80. Afebrile. Left knee aquacell drsg noted clean/intact. Edema noted to left leg.  Upon small flexion of foot, patient s

## 2017-11-26 NOTE — OPERATIVE REPORT
[address]    PATIENT'S NAME: Ivonne Caller   ATTENDING PHYSICIAN: Jazzmine Toribio M.D. OPERATING PHYSICIAN: Jazzmine Toribio M.D.    PATIENT ACCOUNT#:   [de-identified]    LOCATION:  [address] [place] [address]  MEDICAL RECORD #:   [id number]       DATE OF BIRTH: The knee was fully flexed. The patient had severe osteoarthritis in the patellofemoral joint, but the weightbearing joint was fairly well maintained. The medial and lateral menisci were removed. The ACL was sacrificed. The PCL was sacrificed.   The infr hole was created in the center of the tibial plateau adjacent to the anterior horn attachment of the lateral meniscus. The intramedullary device was placed down the drill hole.   The outrigger was attached to the intramedullary device and rotated externall 20 mm.  The patella was osteotomized. The diameter of the patella from superior to inferior was 32 mm. I created drill holes for the patellar button, medializing the patellar button.   Height of the patellar button on the resected surface of the patella w patient went to the recovery area in stable condition. The intraoperative findings were discussed with the patient's family, and postoperative instructions were written.   The assistant surgeon was mandatory to assist with positioning the patient, retrac

## 2017-11-26 NOTE — CONSULTS
[address] [facility] Dosing Service  Warfarin (Coumadin) Subsequent Dosing    Gagan Collins is a 74year old female on outpatient warfarin for whom pharmacy has been dosing warfarin (Coumadin) per consult from Dr Marie Bro.  Goal INR is 2-3    Recent Labs   Lab

## 2017-11-26 NOTE — PROGRESS NOTES
[facility] Hospitalist Progress Note     [facility]      SUBJECTIVE:  Patient with 9/10 pain this morning related to her knee, as well as her baseline 8/10 pain related to her fibromyalgia  Not eating that well  Denies nausea    OBJECTIVE:  Temp:  [98.1 °F Postoperative gas is noted in the surrounding soft tissues with surgical clips anteriorly. CONCLUSION:  Postoperative changes of left total knee arthroplasty as above.     Dictated by: Rashid Cyr MD on 11/24/2017 at 10:42     Approved by: Hiram Dc (COMPAZINE) injection 10 mg 10 mg Intravenous Q6H PRN   ferrous sulfate EC tab 325 mg 325 mg Oral Daily with breakfast   diphenhydrAMINE (BENADRYL) cap/tab 25 mg 25 mg Oral Q4H PRN   Or      DiphenhydrAMINE HCl (BENADRYL) injection 12.5 mg 12.5 mg Intraven post surgery     # Fibromyalgia  - Continue gabapentin nightly     # HTN  - Continue metoprolol XL daily     Prophy:  DVT: anticoagulation as above  Deconditioning prevention: PT    Dispo: admitted post-op TKA, slightly tachy this morning -- giving IVF and

## 2017-11-26 NOTE — CONSULTS
[facility]    Denny Euceda Patient Status:  Inpatient    1950 MRN [id number]   [facility] 3SW-A Attending Jp Gamboa MD   Hosp Day # 2 PCP Nicole Frey MD     Patient Identification  Denny Euceda is a 74 clots in mouth after dental procedure   • Dysuria     chronic dysuria   • Esophageal reflux    • Essential hypertension    • Fibromyalgia    • Gastrointestinal disorder    • Headache    • Helicobacter Pylori (H. Pylori) Infection 8/11    treated with negat date: PORT, INDWELLING, IMP      Comment: power port left upper chest  12/6/11: SM INTESTINAL IMAGE CAPSULE      Comment: Large superficial ulceration along the greater               curvature with stigmata of recent bleeding, SB                otherwise w Daily PRN   FLEET ENEMA (FLEET) 7-19 GM/118ML enema 133 mL 1 enema Rectal Once PRN   Metoclopramide HCl (REGLAN) injection 10 mg 10 mg Intravenous Q6H PRN   Prochlorperazine Edisylate (COMPAZINE) injection 10 mg 10 mg Intravenous Q6H PRN   ferrous sulfate Former User    Quit date: 6/27/1971    Alcohol use No       Family History   Problem Relation Age of Onset   • Cancer Father      lung cancer   • Heart Disorder Mother    • Breast Cancer Self 39   • Substance Abuse Self    • Migraines Self    • Gastro-Inte breastfeeding.     Intake/Output Summary (Last 24 hours) at 11/26/17 1202  Last data filed at 11/26/17 0856   Gross per 24 hour   Intake             1170 ml   Output              600 ml   Net              570 ml       Physical Exam: ASSESSMENT:  Impaired mobility and self-care secondary to L TKA. SLE    Legally blind    Fibromyalgia.      Post-op pain with need for adequate control to allow functional improvement and participation in therapies, with risk for developing ch

## 2017-11-26 NOTE — PROGRESS NOTES
[address] [facility],[address] Patient Status:  Inpatient    1950 MRN [id number]   [facility] 3SW-A Attending Alexis Soto MD   Hosp Day # 2 PCP MD Mateo Preez is a 74year old female patient. Decreased vision    • Deep vein thrombosis (HCC)     blood clots in mouth after dental procedure   • Dysuria     chronic dysuria   • Esophageal reflux    • Essential hypertension    • Fibromyalgia    • Gastrointestinal disorder    • Headache    • Helicobac Comment:Need to be premedicated             Severe vomiting  Dyazide [Hydrochlor*    Rash  Ibuprofen                   Comment:Vomiting, headache  Imuran [Azathioprin*    Rash, Nausea and vomiting  Kdc: Ondansetron           Levaquin                Hives  P

## 2017-11-26 NOTE — PROGRESS NOTES
Acute Pain Service    Post Op Day 2 Ortho Note    Assessed patient in bed. Patient states pain is severe; currently rates pain 9/10; baseline fibromyalgia pain is 8/10. Pt c/o nausea and dizziness; RN states EKG and IVF ordered per Hospitalist service.

## 2017-11-27 VITALS
WEIGHT: 147.06 LBS | HEIGHT: 60 IN | TEMPERATURE: 98 F | BODY MASS INDEX: 28.87 KG/M2 | DIASTOLIC BLOOD PRESSURE: 64 MMHG | SYSTOLIC BLOOD PRESSURE: 134 MMHG | RESPIRATION RATE: 18 BRPM | OXYGEN SATURATION: 93 % | HEART RATE: 84 BPM

## 2017-11-27 PROBLEM — Z47.89 ORTHOPEDIC AFTERCARE: Status: ACTIVE | Noted: 2017-11-27

## 2017-11-27 PROCEDURE — 97150 GROUP THERAPEUTIC PROCEDURES: CPT

## 2017-11-27 PROCEDURE — 97116 GAIT TRAINING THERAPY: CPT

## 2017-11-27 PROCEDURE — 82565 ASSAY OF CREATININE: CPT | Performed by: ORTHOPAEDIC SURGERY

## 2017-11-27 PROCEDURE — 97530 THERAPEUTIC ACTIVITIES: CPT

## 2017-11-27 PROCEDURE — 85027 COMPLETE CBC AUTOMATED: CPT | Performed by: PHYSICIAN ASSISTANT

## 2017-11-27 PROCEDURE — 97535 SELF CARE MNGMENT TRAINING: CPT

## 2017-11-27 PROCEDURE — 85610 PROTHROMBIN TIME: CPT | Performed by: INTERNAL MEDICINE

## 2017-11-27 RX ORDER — HYDRALAZINE HYDROCHLORIDE 50 MG/1
50 TABLET, FILM COATED ORAL EVERY 8 HOURS SCHEDULED
Qty: 90 TABLET | Refills: 0 | Status: SHIPPED | OUTPATIENT
Start: 2017-11-27 | End: 2017-11-30

## 2017-11-27 RX ORDER — WARFARIN SODIUM 4 MG/1
8 TABLET ORAL DAILY
Qty: 60 TABLET | Refills: 0 | Status: SHIPPED | OUTPATIENT
Start: 2017-11-27 | End: 2017-11-30

## 2017-11-27 RX ORDER — HYDROCODONE BITARTRATE AND ACETAMINOPHEN 5; 325 MG/1; MG/1
1 TABLET ORAL EVERY 8 HOURS PRN
Qty: 90 TABLET | Refills: 0 | Status: SHIPPED | OUTPATIENT
Start: 2017-11-27 | End: 2017-11-30

## 2017-11-27 NOTE — CM/SW NOTE
GREG noted PMR consult indicating Subacute rehab. GREG met with pt re: CHRISTOPHER [place]. Discussed CHRISTOPHER list- pt requests referral to Elissa Curry [phone]. Referral sent via [address]. Awaiting response. DON requested.     ADDENDUM:  Informed by [facility] karen lo

## 2017-11-27 NOTE — PHYSICAL THERAPY NOTE
PHYSICAL THERAPY TREATMENT NOTE - INPATIENT    Room Number: 839/711-K     Session: 4  Number of Visits to Meet Established Goals: 3    Presenting Problem: s/p L TKA 11/24/17    Problem List  Active Problems:    Osteoarthritis of left knee  Global 02/22/20 Comment: Performed by Sarahi Yu at [address]  No date: D & C      Comment: x 2  12/9/2010: FRAGMENTING OF KIDNEY STONE      Comment: Performed by Sarahi Yu at [address]  No date: HY mechanics; Relaxation;Repositioning    BALANCE                                                                                                                     Static Sitting: Fair +  Dynamic Sitting: Fair +           Static Standing: Poor +  Dynamic Sta reps    Hip Abd/Add 20 reps    Heel slides 20 reps    Saq 20 reps    SLR 20 reps    Sitting Knee Flexion 20 reps    Standing heel/toe raises 20 reps    Standing knee flexion 20 reps    Extension stretch  1x      Comments: Pt participated in group session, assistance level: modified  independent    Goal #4  Patient will negotiate 4 stairs/one curb w/ assistive device and supervision    Goal #5  AROM 0 degrees extension to 95 degrees flexion      Goal #6        Goal Comments: Goals established on 11/24/2017.

## 2017-11-27 NOTE — PROGRESS NOTES
[address] [facility] Dosing Service  Warfarin (Coumadin) Subsequent Dosing    Spencer Hall is a 74year old female for whom pharmacy has been dosing warfarin (Coumadin).  Goal INR is 2-3    Recent Labs   Lab  11/25/17   1101  11/26/17   0450  11/27/17   0560

## 2017-11-27 NOTE — PLAN OF CARE
Impaired Activities of Daily Living    • Achieve highest/safest level of independence in self care Progressing          Impaired Functional Mobility    • Achieve highest/safest level of mobility/gait Progressing          MUSCULOSKELETAL - ADULT    • Return

## 2017-11-27 NOTE — PROGRESS NOTES
[address] [facility],[address] Patient Status:  Inpatient    1950 MRN [id number]   [place] 3SW-A Attending Davey Clemons MD   Hosp Day # 3 PCP MD Ciaran Sr is a 74year old female patient. Decreased vision    • Deep vein thrombosis (HCC)     blood clots in mouth after dental procedure   • Dysuria     chronic dysuria   • Esophageal reflux    • Essential hypertension    • Fibromyalgia    • Gastrointestinal disorder    • Headache    • Helicobac Comment:Need to be premedicated             Severe vomiting  Dyazide [Hydrochlor*    Rash  Ibuprofen                   Comment:Vomiting, headache  Imuran [Azathioprin*    Rash, Nausea and vomiting  Kdc: Ondansetron           Levaquin                Hives  P

## 2017-11-27 NOTE — PAYOR COMM NOTE
--------------  ADMISSION REVIEW     Payor: BCBS MEDICARE ADV PPO  Subscriber #:  [id number]  Authorization Number: N/A    Admit date: 11/24/17  Admit time: 18       Admitting Physician: Jordan Aquino MD  Attending Physician:  Jordan Aquino MD  [facility] PROCEDURE:  The patient was brought to the operating room, placed on the operating room table in the supine position. An adductor canal block was performed by the anesthesiologist, Dr. Aure Acahrya, followed by general anesthesia.   The patient did not varus/valgus alignment guide was removed. An oscillating saw was used to osteotomize the distal femur, removing bone medially and laterally. The distal femoral cutting block was removed.   The Nex jig was used to size the distal femur, which sized to a si Attention was turned toward the proximal tibia. Soft tissue was removed from the intercondylar notch. The knee was fully flexed. Intermedics retractors were positioned.   A drill hole was created in the center of the tibial plateau adjacent to the anteri than laterally. I elected to go with a constrained component given the patient's age. I cut for a CPS poly. Tracking of the patella was perfect. The patellar thickness was only 20 mm. The patella was osteotomized.   The diameter of the patella from sup AP and lateral x-rays were obtained, which showed perfect alignment of the components with the knee out to full extension and no clear evidence of a complicating process. The patient went to the recovery area in stable condition.    The intraoperative find 11/26/2017 1641 Given 1 tablet Oral Sarah Cadet RN    11/26/2017 1258 Given 1 tablet Oral Rowdy Bacon RN      HYDROcodone-acetaminophen [facility]) 5-325 MG per tab 2 tablet     Date Action Dose Route User    11/27/2017 0636 Given 2 tablet Oral • TraMADol HCl  50 mg Oral Q6H   • OxyCODONE HCl ER  10 mg Oral [email or web address]   • famotidine  40 mg Oral Daily   • gabapentin  300 mg Oral Nightly   • hydrALAzine HCl  10 mg Oral BID   • Metoprolol Succinate ER  50 mg Oral Daily Beta Blocker   • mycophenol

## 2017-11-27 NOTE — PROGRESS NOTES
[address] [facility],[address] Patient Status:  Inpatient    1950 MRN [id number]   [facility] 3SW-A Attending Janelle Vallejo MD   [facility] Day # 3 PCP MD Mateo Perez is a 74year old female patie ([facility] 1991    breast, right   • Calculus of kidney    • Cancer ([facility]    • Decreased vision    • Deep vein thrombosis (HCC)     blood clots in mouth after dental procedure   • Dysuria     chronic dysuria   • Esophageal reflux    • Essential hypertension [Diltiazem*    Rash  Ceclor                  Hives  Contrast Dye [Gadol*        Comment:Need to be premedicated             Severe vomiting  Dyazide [Hydrochlor*    Rash  Ibuprofen                   Comment:Vomiting, headache  Imuran [Azathioprin*    Rash, ongoing-- pain control is adequate but will monitor. 2-- htn-- stable  3-- sLE-- stable clinically  4-- hypercoagulable state-- on coumadin ).

## 2017-11-27 NOTE — CM/SW NOTE
Response from [id number] via [address] that pt is approved for [facility] 11/27-11/29 Auth # [id number]. Updated Tiffani with Sandra Schmitz re: above. Pt accepted for admission today. Discussed 4:30PM d/c time. Updated RN.   Pt had asked about her mishelban

## 2017-11-27 NOTE — OCCUPATIONAL THERAPY NOTE
OCCUPATIONAL THERAPY TREATMENT NOTE - INPATIENT     Room Number: 670/795-C  Session: 2  Number of Visits to Meet Established Goals: 5         History related to current admission:  Pt is 74year old female admitted on 11/24/2017 from home.  Pt is s/p L TKA knee  1991: CHEMOTHERAPY  8/8/11: COLONOSCOPY,DIAGNOSTIC      Comment: Dr. Valdez Course, wn  12/9/2010: CYSTOURETHROSCOPY      Comment: Performed by Tyler Nava at [address]  No date: D & C      Comment: x 2  12/9/2010: Rossi Persaud 8  Location: left knee  Management Techniques: Repositioning     ACTIVITY TOLERANCE  Room Air  No c/o SOB    ACTIVITIES OF DAILY LIVING ASSESSMENT  AM-PAC ‘6-Clicks’ Inpatient Daily Activity Short Form  How much help from another person does the patient cu Goals  Pt will perform grooming in stand w/ supervision - progressing  Pt will perform LB dressing w/ supervision and AE as needed- progressing   Pt will perform toileting: with supervision- progressing     Functional Transfer Goals  Pt will complete sit t

## 2017-11-28 NOTE — CM/SW NOTE
11/28/17 0800   Discharge disposition   Discharged to: Skilled Nurs   Name of Facillity/Home Care/Hospice [facility]   Patient is Discharged to a [address] [place]   Discharge transportation [employer]

## 2017-11-28 NOTE — PLAN OF CARE
Impaired Activities of Daily Living    • Achieve highest/safest level of independence in self care Adequate for Discharge        Impaired Functional Mobility    • Achieve highest/safest level of mobility/gait Adequate for Discharge        MUSCULOSKELETAL -

## 2017-11-29 NOTE — DISCHARGE SUMMARY
Discharge Summary  Patient ID:  Adin Orellana  [id number]  74year old  7/29/1950    Admit date: 11/24/2017    Discharge date and time: 11/28/17    Attending Physician: No att. providers found     Reason for admission: OSTEOARTHRITIS LEFT KNEE  S/p le

## 2017-11-30 ENCOUNTER — SNF VISIT (OUTPATIENT)
Dept: INTERNAL MEDICINE CLINIC | Age: 67
End: 2017-11-30

## 2017-11-30 ENCOUNTER — HOSPITAL ENCOUNTER (INPATIENT)
Facility: HOSPITAL | Age: 67
LOS: 1 days | Discharge: NURSING, REHAB OR LONG-TERM CARE | DRG: 812 | End: 2017-12-01
Attending: EMERGENCY MEDICINE | Admitting: INTERNAL MEDICINE
Payer: MEDICARE

## 2017-11-30 VITALS
WEIGHT: 145.38 LBS | SYSTOLIC BLOOD PRESSURE: 130 MMHG | TEMPERATURE: 98 F | OXYGEN SATURATION: 95 % | HEART RATE: 89 BPM | BODY MASS INDEX: 28 KG/M2 | DIASTOLIC BLOOD PRESSURE: 68 MMHG | RESPIRATION RATE: 18 BRPM

## 2017-11-30 DIAGNOSIS — R79.1 ELEVATED INR: ICD-10-CM

## 2017-11-30 DIAGNOSIS — M32.9 SYSTEMIC LUPUS ERYTHEMATOSUS, UNSPECIFIED SLE TYPE, UNSPECIFIED ORGAN INVOLVEMENT STATUS (HCC): ICD-10-CM

## 2017-11-30 DIAGNOSIS — D68.59 HYPERCOAGULABLE STATE (HCC): ICD-10-CM

## 2017-11-30 DIAGNOSIS — D64.9 SYMPTOMATIC ANEMIA: Primary | ICD-10-CM

## 2017-11-30 DIAGNOSIS — G89.18 POST-OP PAIN: ICD-10-CM

## 2017-11-30 DIAGNOSIS — I10 BENIGN ESSENTIAL HTN: ICD-10-CM

## 2017-11-30 DIAGNOSIS — D62 ACUTE BLOOD LOSS AS CAUSE OF POSTOPERATIVE ANEMIA: ICD-10-CM

## 2017-11-30 DIAGNOSIS — E55.9 VITAMIN D DEFICIENCY: ICD-10-CM

## 2017-11-30 DIAGNOSIS — Z96.652 HISTORY OF TOTAL LEFT KNEE REPLACEMENT: ICD-10-CM

## 2017-11-30 DIAGNOSIS — G20 PARKINSON DISEASE (HCC): ICD-10-CM

## 2017-11-30 DIAGNOSIS — Z79.01 LONG TERM CURRENT USE OF ANTICOAGULANT THERAPY: ICD-10-CM

## 2017-11-30 DIAGNOSIS — R26.9 GAIT ABNORMALITY: ICD-10-CM

## 2017-11-30 DIAGNOSIS — M79.7 FIBROMYALGIA: ICD-10-CM

## 2017-11-30 DIAGNOSIS — M17.12 OSTEOARTHRITIS OF LEFT KNEE, UNSPECIFIED OSTEOARTHRITIS TYPE: Primary | ICD-10-CM

## 2017-11-30 DIAGNOSIS — M32.9 LUPUS ARTHRITIS (HCC): ICD-10-CM

## 2017-11-30 PROCEDURE — 85025 COMPLETE CBC W/AUTO DIFF WBC: CPT | Performed by: EMERGENCY MEDICINE

## 2017-11-30 PROCEDURE — 86901 BLOOD TYPING SEROLOGIC RH(D): CPT | Performed by: EMERGENCY MEDICINE

## 2017-11-30 PROCEDURE — 86920 COMPATIBILITY TEST SPIN: CPT

## 2017-11-30 PROCEDURE — 85730 THROMBOPLASTIN TIME PARTIAL: CPT | Performed by: EMERGENCY MEDICINE

## 2017-11-30 PROCEDURE — 36430 TRANSFUSION BLD/BLD COMPNT: CPT

## 2017-11-30 PROCEDURE — 86900 BLOOD TYPING SEROLOGIC ABO: CPT

## 2017-11-30 PROCEDURE — 96361 HYDRATE IV INFUSION ADD-ON: CPT

## 2017-11-30 PROCEDURE — 99291 CRITICAL CARE FIRST HOUR: CPT

## 2017-11-30 PROCEDURE — 87081 CULTURE SCREEN ONLY: CPT | Performed by: INTERNAL MEDICINE

## 2017-11-30 PROCEDURE — 30233N1 TRANSFUSION OF NONAUTOLOGOUS RED BLOOD CELLS INTO PERIPHERAL VEIN, PERCUTANEOUS APPROACH: ICD-10-PCS | Performed by: INTERNAL MEDICINE

## 2017-11-30 PROCEDURE — 99310 SBSQ NF CARE HIGH MDM 45: CPT | Performed by: NURSE PRACTITIONER

## 2017-11-30 PROCEDURE — 86850 RBC ANTIBODY SCREEN: CPT

## 2017-11-30 PROCEDURE — 96374 THER/PROPH/DIAG INJ IV PUSH: CPT

## 2017-11-30 PROCEDURE — 99285 EMERGENCY DEPT VISIT HI MDM: CPT

## 2017-11-30 PROCEDURE — 80053 COMPREHEN METABOLIC PANEL: CPT | Performed by: EMERGENCY MEDICINE

## 2017-11-30 PROCEDURE — 86900 BLOOD TYPING SEROLOGIC ABO: CPT | Performed by: EMERGENCY MEDICINE

## 2017-11-30 PROCEDURE — 86850 RBC ANTIBODY SCREEN: CPT | Performed by: EMERGENCY MEDICINE

## 2017-11-30 PROCEDURE — 86901 BLOOD TYPING SEROLOGIC RH(D): CPT

## 2017-11-30 PROCEDURE — 85018 HEMOGLOBIN: CPT | Performed by: INTERNAL MEDICINE

## 2017-11-30 PROCEDURE — 85610 PROTHROMBIN TIME: CPT | Performed by: EMERGENCY MEDICINE

## 2017-11-30 RX ORDER — HYDROCODONE BITARTRATE AND ACETAMINOPHEN 10; 325 MG/1; MG/1
1 TABLET ORAL EVERY 6 HOURS PRN
Status: DISCONTINUED | OUTPATIENT
Start: 2017-11-30 | End: 2017-12-01

## 2017-11-30 RX ORDER — TRAMADOL HYDROCHLORIDE 50 MG/1
50 TABLET ORAL 2 TIMES DAILY
Status: DISCONTINUED | OUTPATIENT
Start: 2017-11-30 | End: 2017-12-01

## 2017-11-30 RX ORDER — FAMOTIDINE 20 MG/1
40 TABLET ORAL DAILY
Status: DISCONTINUED | OUTPATIENT
Start: 2017-12-01 | End: 2017-12-01

## 2017-11-30 RX ORDER — MELATONIN
400 DAILY
Status: DISCONTINUED | OUTPATIENT
Start: 2017-12-01 | End: 2017-12-01

## 2017-11-30 RX ORDER — WARFARIN SODIUM 4 MG/1
8 TABLET ORAL NIGHTLY
COMMUNITY
End: 2017-12-12 | Stop reason: DRUGHIGH

## 2017-11-30 RX ORDER — MELATONIN
400 DAILY
COMMUNITY

## 2017-11-30 RX ORDER — ONDANSETRON 2 MG/ML
4 INJECTION INTRAMUSCULAR; INTRAVENOUS EVERY 4 HOURS PRN
Status: DISCONTINUED | OUTPATIENT
Start: 2017-11-30 | End: 2017-12-01

## 2017-11-30 RX ORDER — MYCOPHENOLATE MOFETIL 250 MG/1
1500 CAPSULE ORAL 2 TIMES DAILY
Status: DISCONTINUED | OUTPATIENT
Start: 2017-11-30 | End: 2017-12-01

## 2017-11-30 RX ORDER — POTASSIUM CITRATE 5 MEQ/1
10 TABLET, EXTENDED RELEASE ORAL 3 TIMES DAILY
Status: DISCONTINUED | OUTPATIENT
Start: 2017-11-30 | End: 2017-12-01

## 2017-11-30 RX ORDER — HYDROMORPHONE HYDROCHLORIDE 1 MG/ML
0.5 INJECTION, SOLUTION INTRAMUSCULAR; INTRAVENOUS; SUBCUTANEOUS EVERY 30 MIN PRN
Status: ACTIVE | OUTPATIENT
Start: 2017-11-30 | End: 2017-11-30

## 2017-11-30 RX ORDER — CALCIUM CARBONATE 500(1250)
500 TABLET ORAL 2 TIMES DAILY
Status: DISCONTINUED | OUTPATIENT
Start: 2017-11-30 | End: 2017-12-01

## 2017-11-30 RX ORDER — ECHINACEA 400 MG
1000 CAPSULE ORAL 2 TIMES DAILY
Status: DISCONTINUED | OUTPATIENT
Start: 2017-11-30 | End: 2017-11-30

## 2017-11-30 RX ORDER — HYDROCODONE BITARTRATE AND ACETAMINOPHEN 10; 325 MG/1; MG/1
1-2 TABLET ORAL EVERY 6 HOURS PRN
Status: DISCONTINUED | OUTPATIENT
Start: 2017-11-30 | End: 2017-11-30 | Stop reason: SDUPTHER

## 2017-11-30 RX ORDER — DOXEPIN HYDROCHLORIDE 50 MG/1
1 CAPSULE ORAL DAILY
Status: DISCONTINUED | OUTPATIENT
Start: 2017-12-01 | End: 2017-12-01

## 2017-11-30 RX ORDER — DOCUSATE SODIUM 100 MG/1
100 CAPSULE, LIQUID FILLED ORAL 2 TIMES DAILY
Status: DISCONTINUED | OUTPATIENT
Start: 2017-11-30 | End: 2017-12-01

## 2017-11-30 RX ORDER — SODIUM CHLORIDE 9 MG/ML
125 INJECTION, SOLUTION INTRAVENOUS CONTINUOUS
Status: DISCONTINUED | OUTPATIENT
Start: 2017-11-30 | End: 2017-12-01

## 2017-11-30 RX ORDER — ACETAMINOPHEN 325 MG/1
650 TABLET ORAL EVERY 6 HOURS PRN
COMMUNITY
End: 2018-03-13

## 2017-11-30 RX ORDER — HYDRALAZINE HYDROCHLORIDE 50 MG/1
75 TABLET, FILM COATED ORAL EVERY 8 HOURS
Status: ON HOLD | COMMUNITY
End: 2017-12-01

## 2017-11-30 RX ORDER — MULTIVITAMIN WITH FOLIC ACID 400 MCG
1 TABLET ORAL DAILY
Status: ON HOLD | COMMUNITY
End: 2019-12-10

## 2017-11-30 RX ORDER — POLYETHYLENE GLYCOL 3350 17 G/17G
17 POWDER, FOR SOLUTION ORAL DAILY
Status: DISCONTINUED | OUTPATIENT
Start: 2017-12-01 | End: 2017-12-01

## 2017-11-30 RX ORDER — MELATONIN
325 2 TIMES DAILY WITH MEALS
Status: DISCONTINUED | OUTPATIENT
Start: 2017-11-30 | End: 2017-12-01

## 2017-11-30 RX ORDER — GABAPENTIN 300 MG/1
300 CAPSULE ORAL NIGHTLY
Status: DISCONTINUED | OUTPATIENT
Start: 2017-11-30 | End: 2017-12-01

## 2017-11-30 RX ORDER — HYDROMORPHONE HYDROCHLORIDE 1 MG/ML
0.5 INJECTION, SOLUTION INTRAMUSCULAR; INTRAVENOUS; SUBCUTANEOUS ONCE
Status: COMPLETED | OUTPATIENT
Start: 2017-11-30 | End: 2017-11-30

## 2017-11-30 RX ORDER — METOPROLOL SUCCINATE 50 MG/1
75 TABLET, EXTENDED RELEASE ORAL DAILY
COMMUNITY
End: 2018-07-13

## 2017-11-30 RX ORDER — ACETAMINOPHEN 325 MG/1
650 TABLET ORAL EVERY 6 HOURS PRN
Status: DISCONTINUED | OUTPATIENT
Start: 2017-11-30 | End: 2017-12-01

## 2017-11-30 RX ORDER — HYDROCODONE BITARTRATE AND ACETAMINOPHEN 10; 325 MG/1; MG/1
2 TABLET ORAL EVERY 6 HOURS PRN
Status: DISCONTINUED | OUTPATIENT
Start: 2017-11-30 | End: 2017-12-01

## 2017-11-30 RX ORDER — HYDRALAZINE HYDROCHLORIDE 25 MG/1
75 TABLET, FILM COATED ORAL EVERY 8 HOURS
COMMUNITY
End: 2017-12-15

## 2017-11-30 NOTE — ED NOTES
Report received from [employer], pt awake and alert, respirations regular non labored. Call light within reach. Pt legally blind. VSS. Pt updated on POC. All questions answered.

## 2017-11-30 NOTE — PLAN OF CARE
Patient/Family Goals    • Patient/Family Long Term Goal Progressing    • Patient/Family Short Term Goal Progressing        Pt admitted to unit from ED on cart. VSS. Blood currently transfusing, 1 unit of PRBCs started in ER.  Vit K given at [address] [place] [address]

## 2017-11-30 NOTE — ED PROVIDER NOTES
Patient Seen in: [facility] Emergency Department    History   Patient presents with:  Abnormal Result (metabolic, cardiac)    Stated Complaint:     HPI    29-year-old woman who had left knee replacement 6 days ago by Dr. Tameka Fowler.   She is currently at glasses   • Vitamin D deficiency        Past Surgical History:  No date: ARTHROSCOPY OF JOINT UNLISTED Left      Comment: knee  1991: CHEMOTHERAPY  8/8/11: COLONOSCOPY,DIAGNOSTIC      Comment: Dr. Neisha Esquivel, wnl  12/9/2010: CYSTOURETHROSCOPY      Comment: Pe Quit date: 6/27/1971  Alcohol use: No                Review of Systems    Positive for stated complaint:   Other systems are as noted in HPI. Constitutional and vital signs reviewed.       All other systems reviewed and negative except as noted abov Prelim 8.85 (*)     Neutrophil Absolute 8.85 (*)     Monocyte Absolute 1.55 (*)     All other components within normal limits   CBC WITH DIFFERENTIAL WITH PLATELET    Narrative:      The following orders were created for panel order CBC WITH DIFFERENTIAL WI pain    Disposition:  Admit  11/30/2017  3:19 pm    Follow-up:  No follow-up provider specified.       Medications Prescribed:  Current Discharge Medication List        Present on Admission  Date Reviewed: 10/3/2017          ICD-10-CM Noted POA    * Ana Maria Francisco

## 2017-11-30 NOTE — ED NOTES
Report given to STEFANIE Cheema for room 384. Pt awake and alert, pt repositioned, readjusted knee with pillow and towel as requested. Pt is knon to STEFANIE Cheema from previous admission. PRBC 1 unit infusing via port through the pump. VSS.  Pt being transported to [facility]

## 2017-11-30 NOTE — ED INITIAL ASSESSMENT (HPI)
HGB 6.7, INR 3.23  Was Given 2.5mg Vit K at 1300.  Recent knee surgery on 11/24, currently at [place], Hx DVTs

## 2017-11-30 NOTE — PROGRESS NOTES
Marinarosmery Roberson  : 1950  Age 74year old  female patient is admitted to Facility: [address] [facility] [address] for CHRISTOPHER after elective left TKA.     [address] [place] [address] Drive date:    17  Discharge date to [facility]:    17  ELOS:  9-12 days  Anticipat Headache    • Helicobacter Pylori (H. Pylori) Infection 8/11    treated with negative stool assay 12/11   • High blood pressure    • History of blood transfusion    • Hypercoagulable state ([facility]    • Muscle weakness     right sided weakness   • Neuropathy curvature with stigmata of recent bleeding, SB                otherwise wnl  No date: TONSILLECTOMY  8/11: UPPER GI ENDOSCOPY,DIAGNOSIS      Comment: Dr. Nalini Thomason, mild gastitis, bx + for H.                pylori, duodenal bx negative for celiac disease Comment:Throat closes/swells, rash, hives  Tetanus Toxoid-Diph*        Comment:Vomiting severe swelling at injection site  Tetracyclines & Rel*        Comment:Throat closes/ swells, rash  Vancomycin              Rash    CODE STATUS:  Full Code    ADV Rfl: 2   Warfarin Sodium (COUMADIN) 5 MG Oral Tab Take one or two daily or as directed by coumadin clinic Disp: 180 tablet Rfl: 3   B Complex Vitamins (B COMPLEX OR) Take by mouth daily.  Disp:  Rfl:    Polyethyl Glycol-Propyl Glycol (SYSTANE OP) Apply 2  congestion, sinus pain or sore throat; and hearing loss negative; +swallowing difficulty x few months, food gets stuck when swallowing  RESPIRATORY: denies shortness of breath, wheezing or cough   CARDIOVASCULAR:denies chest pain, no palpitations , denies rebound tenderness.   :Deferred  LYMPHATIC:no lymphedema  MUSCULOSKELETAL: no acute synovitis upper or lower extremity  EXTREMITIES/VASCULAR:no cyanosis, clubbing or edema, radial pulses 2+ and dorsalis pedal pulses 2+; 1+ edema to entire LLE  NEUROLOGIC: prn  2. Lidoderm 5% patch daily  3. Cellcept 500 mg; 3 tabs BID  4. Gabapentin 300 mg q HS  5. Tramadol as above  6. Norco as above    Osteopenia  1. Calcium 600 mg BID  2. Vitamin D 1,000u daily  3. Check Vitamin D    Kidney stone  1.  Urocit K 10 meq TID

## 2017-12-01 ENCOUNTER — APPOINTMENT (OUTPATIENT)
Dept: ULTRASOUND IMAGING | Facility: HOSPITAL | Age: 67
DRG: 812 | End: 2017-12-01
Attending: INTERNAL MEDICINE
Payer: MEDICARE

## 2017-12-01 VITALS
HEART RATE: 93 BPM | OXYGEN SATURATION: 98 % | WEIGHT: 145.5 LBS | TEMPERATURE: 99 F | RESPIRATION RATE: 20 BRPM | SYSTOLIC BLOOD PRESSURE: 135 MMHG | HEIGHT: 60 IN | BODY MASS INDEX: 28.57 KG/M2 | DIASTOLIC BLOOD PRESSURE: 69 MMHG

## 2017-12-01 PROCEDURE — 85025 COMPLETE CBC W/AUTO DIFF WBC: CPT | Performed by: INTERNAL MEDICINE

## 2017-12-01 PROCEDURE — 97530 THERAPEUTIC ACTIVITIES: CPT

## 2017-12-01 PROCEDURE — 97161 PT EVAL LOW COMPLEX 20 MIN: CPT

## 2017-12-01 PROCEDURE — 85018 HEMOGLOBIN: CPT | Performed by: INTERNAL MEDICINE

## 2017-12-01 PROCEDURE — 93971 EXTREMITY STUDY: CPT | Performed by: INTERNAL MEDICINE

## 2017-12-01 PROCEDURE — 85610 PROTHROMBIN TIME: CPT | Performed by: INTERNAL MEDICINE

## 2017-12-01 NOTE — PROGRESS NOTES
[address] [facility],[address] Patient Status:  Inpatient    1950 MRN [id number]   [facility] 3SW-A Attending Burak Antonio MD   [facility] Day # 1 PCP Bradnen Doe MD     Pam Disla is a 74year old female patie general anesthesia   • Blind     legally blind   • Blood disorder     sickle cell trait   • Breast cancer ([facility] 1991    breast, right   • Calculus of kidney    • Cancer ([facility]    • Decreased vision    • Deep vein thrombosis (HCC)     blood clots in mouth af vomiting  Kdc: Ondansetron           Levaquin                Hives  Penicillins                 Comment:Throat closes/swells, hives  Purinethol [Mercapt*    Rash  Sulfa Antibiotics           Comment:Throat closes/swells, rash, hives  Tetanus Toxoid-Diph* 12/01/2017   .0 12/01/2017   CREATSERUM 0.69 11/30/2017   BUN 11 11/30/2017    11/30/2017   K 3.6 11/30/2017    11/30/2017   CO2 30.0 11/30/2017    11/30/2017   CA 9.0 11/30/2017   ALB 2.5 11/30/2017   ALKPHO 88 11/30/2017   BILT

## 2017-12-01 NOTE — CM/SW NOTE
GREG received Marlyn Morley from [place]. Mary Chamberlain is good until 12/5. GREG received an auth number of [id number]. Informed [employer]. [employer] confirmed the pt could dc. No other needs.

## 2017-12-01 NOTE — PLAN OF CARE
DISCHARGE PLANNING    • Discharge to home or other facility with appropriate resources Progressing        HEMATOLOGIC - ADULT    • Maintains hematologic stability Progressing        MUSCULOSKELETAL - ADULT    • Return mobility to safest level of function P

## 2017-12-01 NOTE — PAYOR COMM NOTE
--------------  ADMISSION REVIEW     Payor: BCBS MEDICARE ADV PPO  Subscriber #:  [id number]  Authorization Number: N/A    Admit date: 11/30/17  Admit time: 1727       Admitting Physician: Tiara Hogue MD  Attending Physician:  Liberty Cee, 12/11   • High blood pressure    • History of blood transfusion    • Hypercoagulable state ([facility]    • Muscle weakness     right sided weakness   • Neuropathy     hands and feet   • Osteoarthritis     generalized   • Parkinson's disease    • Personal histor TONSILLECTOMY  8/11: UPPER GI ENDOSCOPY,DIAGNOSIS      Comment: Dr. Florencia Dykes, mild gastitis, bx + for H.                pylori, duodenal bx negative for celiac disease  4/30/12: UPPER GI ENDOSCOPY,DIAGNOSIS      Comment: wnl, gastric bx benign and negative All other components within normal limits   CBC W/ DIFFERENTIAL - Abnormal; Notable for the following:     WBC 13.7 (*)     RBC 2.60 (*)     HGB 6.9 (*)     HCT 20.1 (*)     MCV 77.3 (*)     MCH 26.5 (*)     Neutrophil Absolute Prelim 8.85 (*)     Neutroph Principal Problem:    Symptomatic anemia  Active Problems:    Post-op pain    Alyssa Kay is a 74year old female.     Allergies:   Amitriptyline Hcl       Rash, Swelling    Comment:Throat closes/swells, rash  Benlysta [Belimumab]    Rash, Celeitan Muniz atraumatic. Right Ear: External ear normal.   Left Ear: External ear normal.   Nose: Nose normal.   Mouth/Throat: Oropharynx is clear and moist.   Eyes: Conjunctivae and EOM are normal. Pupils are equal, round, and reactive to light. No scleral icterus. Melina Hill  11/30/2017[KV. 1]    MEDICATIONS ADMINISTERED IN LAST 1 DAY:  docusate sodium (COLACE) cap 100 mg     Date Action Dose Route User    11/30/2017 2121 Given 100 mg Oral Noa Demarco RN      ferrous sulfate EC tab 325 mg     Date Act

## 2017-12-01 NOTE — PHYSICAL THERAPY NOTE
PHYSICAL THERAPY EVALUATION - INPATIENT     Room Number: 384/384-A  Evaluation Date: 12/1/2017  Type of Evaluation: Initial  Physician Order: PT Eval and Treat    Presenting Problem: s/p TKR and anemia  Reason for Therapy: Mobility Dysfunction and Disc CHEMOTHERAPY  8/8/11: COLONOSCOPY,DIAGNOSTIC      Comment: Dr. Yumiko Weiner, wnl  12/9/2010: CYSTOURETHROSCOPY      Comment: Performed by Ky Schroeder at [address]  No date: D & C      Comment: x 2  12/9/2010: Syeda Clements Restriction: L lower extremity           L Lower Extremity: Weight Bearing as Tolerated    PAIN ASSESSMENT  Ratin  Location: L Knee  Management Techniques:  Activity promotion;Repositioning    COGNITION  · Overall Cognitive Status:  WFL - within functio Provided: evaluation; pt instructed in the role of PT, POC and D/C plans. Pt verbalized understanding. Pt demonstrated CGA to min A with bed mobility, sit to stand and transfer to commode in room with RW.  Pt is continuing to be limited by significant pain Meet Established Goals: 5      CURRENT GOALS    Goal #1 Patient is able to demonstrate supine - sit EOB @ level: supervision     Goal #2 Patient is able to demonstrate transfers Sit to/from Stand at assistance level: supervision     Goal #3 Patient is able

## 2017-12-01 NOTE — PLAN OF CARE
D/c report called to receiving RN at [facility]. Pt was at this facility before admission to ED last evening. Scripts still current at [facility] from last D/c. Requested to leave port accessed for PT/INR draw tomorrow. Will d/c to [facility] via [employer] at 1800.

## 2017-12-01 NOTE — PLAN OF CARE
Dr. Lilibeth Mahmood paged of the follow up Hgb and negative blood clot result. Hiram Bryson for d/c back to [facility] with redraw PT/INR tomorrow.

## 2017-12-01 NOTE — H&P
[address] [facility],[address] Patient Status:  Inpatient    1950 MRN [id number]   [facility] 3SW-A Attending Len Olmedo MD   [facility] Day # 0 PCP Flor Martin MD     Principal Problem:    Symptomatic anemia  Active Fibromyalgia    • Gastrointestinal disorder    • Headache    • Helicobacter Pylori (H. Pylori) Infection 8/11    treated with negative stool assay 12/11   • High blood pressure    • History of blood transfusion    • Hypercoagulable state ([facility]    • Muscle Exam reveals no gallop. No murmur heard. Pulmonary/Chest: Effort normal and breath sounds normal. No stridor. No respiratory distress. She has no wheezes. She has no rales. She exhibits no tenderness. Abdominal: Soft.  Bowel sounds are normal. She exh

## 2017-12-01 NOTE — CM/SW NOTE
12/01/17 1600   Discharge disposition   Discharged to: Skilled Nurs   Name of Rodo Oro   [facility]. Pt stated she would inform her  of her dc.  RN aware

## 2017-12-01 NOTE — PROGRESS NOTES
Patient's flax seed oil capsules was discontinued while in-house as it is considered a supplement per P&T approved protocol. This supplement can be resumed at discharge using the discharge reconciliation process.

## 2017-12-02 NOTE — PAYOR COMM NOTE
--------------  DISCHARGE REVIEW    Payor: BCBS MEDICARE ADV PPO  Subscriber #:  [id number]  Authorization Number: [id number]    Admit date: 11/24/17  Admit time:  5056  Discharge Date: 11/27/2017  5:00 PM     Admitting Physician: Micah Pierce MD  Att

## 2017-12-05 ENCOUNTER — SNF VISIT (OUTPATIENT)
Dept: INTERNAL MEDICINE CLINIC | Age: 67
End: 2017-12-05

## 2017-12-05 VITALS
TEMPERATURE: 98 F | SYSTOLIC BLOOD PRESSURE: 144 MMHG | OXYGEN SATURATION: 97 % | RESPIRATION RATE: 18 BRPM | HEART RATE: 92 BPM | BODY MASS INDEX: 28 KG/M2 | WEIGHT: 144.81 LBS | DIASTOLIC BLOOD PRESSURE: 58 MMHG

## 2017-12-05 DIAGNOSIS — M32.9 SYSTEMIC LUPUS ERYTHEMATOSUS, UNSPECIFIED SLE TYPE, UNSPECIFIED ORGAN INVOLVEMENT STATUS (HCC): ICD-10-CM

## 2017-12-05 DIAGNOSIS — D68.59 HYPERCOAGULABLE STATE (HCC): ICD-10-CM

## 2017-12-05 DIAGNOSIS — R26.9 GAIT ABNORMALITY: ICD-10-CM

## 2017-12-05 DIAGNOSIS — M79.7 FIBROMYALGIA: ICD-10-CM

## 2017-12-05 DIAGNOSIS — Z96.652 HISTORY OF TOTAL LEFT KNEE REPLACEMENT: ICD-10-CM

## 2017-12-05 DIAGNOSIS — I10 BENIGN ESSENTIAL HTN: ICD-10-CM

## 2017-12-05 DIAGNOSIS — R79.1 ELEVATED INR: ICD-10-CM

## 2017-12-05 DIAGNOSIS — E55.9 VITAMIN D DEFICIENCY: ICD-10-CM

## 2017-12-05 DIAGNOSIS — M32.9 LUPUS ARTHRITIS (HCC): ICD-10-CM

## 2017-12-05 DIAGNOSIS — D62 ACUTE BLOOD LOSS AS CAUSE OF POSTOPERATIVE ANEMIA: ICD-10-CM

## 2017-12-05 DIAGNOSIS — G20 PARKINSON DISEASE (HCC): ICD-10-CM

## 2017-12-05 DIAGNOSIS — M17.12 OSTEOARTHRITIS OF LEFT KNEE, UNSPECIFIED OSTEOARTHRITIS TYPE: Primary | ICD-10-CM

## 2017-12-05 DIAGNOSIS — M85.80 OSTEOPENIA, UNSPECIFIED LOCATION: ICD-10-CM

## 2017-12-05 PROCEDURE — 99310 SBSQ NF CARE HIGH MDM 45: CPT | Performed by: NURSE PRACTITIONER

## 2017-12-05 NOTE — PROGRESS NOTES
Lou Sanchez  : 1950  Age 74year old  female patient is admitted to Facility: [address] [facility] [address] for CHRISTOPHER after readmission for symptomatic anemia. [address] [place] [address] Drive date:    17  For left TKA  Discharge date to [facility]:    . orthopnea, denies cough  GI: denies nausea, vomiting, constipation, diarrhea; no rectal bleeding; no heartburn  :no dysuria, urgency or frequency; no vaginal discharge; no urinary incontinence; no hematuria  MUSCULOSKELETAL:no joint complaints upper or l cyanosis, clubbing or edema, radial pulses 2+ and dorsalis pedal pulses 2+; 1+ edema to entire LLE  NEUROLOGIC: intact; no sensorimotor deficit, cranial nerves intact II-XII, follows commands; tremor to b/l UE w/ right more pronounced  PSYCHIATRIC: alert a completing medication reconciliation and entering orders to establish plan of care in [facility].     LASHAUN Cole  12/5/17   11:00 AM

## 2017-12-05 NOTE — PAYOR COMM NOTE
--------------  DISCHARGE REVIEW    Payor: BCBS MEDICARE ADV PPO  Subscriber #:  [id number]  Authorization Number: [id number]      Admit date: 11/30/17  Admit time:  1727  Discharge Date: 12/1/2017  7:02 PM     Admitting Physician: Jeanna Tirado

## 2017-12-06 NOTE — PAYOR COMM NOTE
--------------  CONTINUED STAY REVIEW    Payor: BCBS MEDICARE ADV PPO  Subscriber #:  [id number]  Authorization Number: [id number]      Admit date: 11/30/17  Admit time: 1727    Admitting Physician: Vanesa London MD  Attending Physician:  Jacqueline att. bridge 60 mg sq q 12 hrs until INR >2  7. PT/INR prn     GERD  1. Famotidine 40 mg daily     OA/Lupus arthritis/PN/Fibromyalgia  Usual pain 8-9/10 - Since surgery 9/10 constant  1. Tylenol 650 mg q 6 hrs prn  2. Lidoderm 5% patch daily  3.  Cellcept 500 mg; 50 mg Order Report 2 times daily 11/30/17 12/01/17   famoTIDine (PEPCID) tab 40 mg Order Report Daily 12/01/17 12/01/17   Potassium Citrate ER (UROCIT-K) SR tab 10 mEq Order Report 3 times daily 11/30/17 12/01/17   mycophenolate mofetil (CELLCEPT) cap 1,50

## 2017-12-06 NOTE — PAYOR COMM NOTE
Admit to inpatient Once (Order #[id number]) on 11/30/17    Pt was admitted at [facility] LOC from beginning of this admission  Not sure where the OBS flag came from per the recent fax from Etienne Inman RN, [place]

## 2017-12-07 ENCOUNTER — SNF VISIT (OUTPATIENT)
Dept: INTERNAL MEDICINE CLINIC | Age: 67
End: 2017-12-07

## 2017-12-07 VITALS
TEMPERATURE: 98 F | SYSTOLIC BLOOD PRESSURE: 126 MMHG | HEART RATE: 98 BPM | OXYGEN SATURATION: 96 % | DIASTOLIC BLOOD PRESSURE: 60 MMHG | RESPIRATION RATE: 20 BRPM

## 2017-12-07 DIAGNOSIS — M17.12 OSTEOARTHRITIS OF LEFT KNEE, UNSPECIFIED OSTEOARTHRITIS TYPE: Primary | ICD-10-CM

## 2017-12-07 DIAGNOSIS — R26.9 GAIT ABNORMALITY: ICD-10-CM

## 2017-12-07 DIAGNOSIS — M25.562 ACUTE PAIN OF LEFT KNEE: ICD-10-CM

## 2017-12-07 DIAGNOSIS — D72.829 LEUKOCYTOSIS, UNSPECIFIED TYPE: ICD-10-CM

## 2017-12-07 DIAGNOSIS — Z96.652 HISTORY OF TOTAL LEFT KNEE REPLACEMENT: ICD-10-CM

## 2017-12-07 PROCEDURE — 99309 SBSQ NF CARE MODERATE MDM 30: CPT | Performed by: NURSE PRACTITIONER

## 2017-12-07 RX ORDER — OXYCODONE AND ACETAMINOPHEN 7.5; 325 MG/1; MG/1
1-2 TABLET ORAL EVERY 4 HOURS PRN
COMMUNITY
End: 2017-12-15

## 2017-12-07 NOTE — PROGRESS NOTES
Travis Frias, 7/29/1950, 74year old, female    Chief Complaint:  Patient presents with:   Follow - Up: s/p Left TKA on 11.24.17       Subjective:  PMH significant for legally blind, Parkinson's dz, HA, CVA-like sxs, remote hx of seizure, arrhythmia, S2 normal, RRR; no S3, no S4; , no click, no murmur  ABDOMEN:  normal active BS+, soft, nondistended; no organomegaly, no masses; no bruits; +tender, no guarding, no rebound tenderness.   :Deferred  LYMPHATIC:no lymphedema  MUSCULOSKELETAL: no acute synov daily  3. Cellcept 500 mg; 3 tabs BID  4. Gabapentin 300 mg q HS  5. Tramadol as above  6. Percocet as above  7. Check uric acid in am   8. Consult palliative care for pain management recommendations     Osteopenia  1. Calcium 600 mg BID  2.  Vitamin D 1,00

## 2017-12-10 ENCOUNTER — SNF ADMIT/H&P (OUTPATIENT)
Dept: FAMILY MEDICINE CLINIC | Facility: CLINIC | Age: 67
End: 2017-12-10

## 2017-12-10 DIAGNOSIS — D57.3 SICKLE CELL TRAIT (HCC): ICD-10-CM

## 2017-12-10 DIAGNOSIS — Z86.73 HISTORY OF CVA (CEREBROVASCULAR ACCIDENT): ICD-10-CM

## 2017-12-10 DIAGNOSIS — I10 BENIGN ESSENTIAL HTN: ICD-10-CM

## 2017-12-10 DIAGNOSIS — M32.9 SYSTEMIC LUPUS ERYTHEMATOSUS, UNSPECIFIED SLE TYPE, UNSPECIFIED ORGAN INVOLVEMENT STATUS (HCC): Primary | ICD-10-CM

## 2017-12-10 DIAGNOSIS — G20 PARKINSONISM, UNSPECIFIED PARKINSONISM TYPE (HCC): ICD-10-CM

## 2017-12-10 DIAGNOSIS — R53.81 PHYSICAL DECONDITIONING: ICD-10-CM

## 2017-12-10 DIAGNOSIS — K59.00 CONSTIPATION, UNSPECIFIED CONSTIPATION TYPE: ICD-10-CM

## 2017-12-10 DIAGNOSIS — N20.0 KIDNEY STONE: ICD-10-CM

## 2017-12-10 DIAGNOSIS — D50.0 BLOOD LOSS ANEMIA: ICD-10-CM

## 2017-12-10 DIAGNOSIS — Z85.3 HISTORY OF BREAST CANCER: ICD-10-CM

## 2017-12-10 DIAGNOSIS — Z79.01 LONG TERM CURRENT USE OF ANTICOAGULANT THERAPY: ICD-10-CM

## 2017-12-10 DIAGNOSIS — G47.9 SLEEP DISORDER: ICD-10-CM

## 2017-12-10 DIAGNOSIS — R79.1 SUPRATHERAPEUTIC INR: ICD-10-CM

## 2017-12-10 DIAGNOSIS — K21.9 GASTROESOPHAGEAL REFLUX DISEASE WITHOUT ESOPHAGITIS: ICD-10-CM

## 2017-12-10 DIAGNOSIS — D62 ACUTE BLOOD LOSS ANEMIA: ICD-10-CM

## 2017-12-10 DIAGNOSIS — D68.59 HYPERCOAGULABLE STATE (HCC): ICD-10-CM

## 2017-12-10 DIAGNOSIS — M32.9 LUPUS ARTHRITIS (HCC): ICD-10-CM

## 2017-12-10 DIAGNOSIS — M85.869 OSTEOPENIA OF LOWER LEG, UNSPECIFIED LATERALITY: ICD-10-CM

## 2017-12-10 DIAGNOSIS — R25.1 TREMOR: ICD-10-CM

## 2017-12-10 DIAGNOSIS — Z86.718 HISTORY OF DVT (DEEP VEIN THROMBOSIS): ICD-10-CM

## 2017-12-10 DIAGNOSIS — R53.1 GENERALIZED WEAKNESS: ICD-10-CM

## 2017-12-10 PROCEDURE — 99306 1ST NF CARE HIGH MDM 50: CPT | Performed by: FAMILY MEDICINE

## 2017-12-11 NOTE — PROGRESS NOTES
[address] [place] Author: Antonieta Nieves MD     1950 MRN [id number]   [facility]  Admission 17      Last Hospital Discharge 17 Tyler Thurman of Discharge 17       Date of Adm symptomatic anemia, blood loss and on coumadin. HPI   75 yo female presenting to [facility] s/p left TKA was noted to have post op complication of acute anemia requiring 1 unit PRBCs.   Patient was noted to have supra-therapeutic INR  With sta Warfarin Sodium 4 MG Oral Tab Take 8 mg by mouth nightly. Alternating 8 mg w/ 7 mg QOD    hydrALAzine HCl 25 MG Oral Tab Take 75 mg by mouth every 8 (eight) hours. gabapentin 300 MG Oral Cap Take 1 capsule (300 mg total) by mouth nightly.    Mycopheno antineoplastic chemotherapy (1991); Problems with swallowing; Scleroderma ([facility] Seizure disorder ([facility] Sickle cell trait ([facility] Sjogren's disease ([facility] Stroke [facility]); Systemic lupus ([facility] Visual impairment; and Vitamin D deficiency.     She  has a past s atraumatic. Nose: Nose normal.   Mouth/Throat: No oropharyngeal exudate. Eyes: Pupils are equal, round, and reactive to light. Right eye exhibits no discharge. Left eye exhibits no discharge. No scleral icterus. Neck: Normal range of motion.  Neck sup soft tissues with surgical clips anteriorly. CONCLUSION:  Postoperative changes of left total knee arthroplasty as above.     Dictated by: Sonya Hernandez MD on 11/24/2017 at 10:42     Approved by: Sonya Hernandez MD            Us Venous Doppler Leg Mandi Ace contralateral common femoral vein. PATIENT STATED HISTORY: (As transcribed by Technologist)  Left TKA, leg edema and calf pain. FINDINGS:  EXTREMITY EXAMINED:  Left leg SAPHENOFEMORAL JUNCTION:  No reflux. THROMBI:  None visible.  COMPRESSION:  Normal c ([facility]  -as above, stable, CPM    Zahida Gilbert needs then following services:  Occupational Therapy  Physical Therapy  Respiratory Therapy  wound care    I anticipate that she will need 12 days of therapy and recooperation before an eventual transiti

## 2017-12-12 ENCOUNTER — SNF DISCHARGE (OUTPATIENT)
Dept: INTERNAL MEDICINE CLINIC | Age: 67
End: 2017-12-12

## 2017-12-12 VITALS
RESPIRATION RATE: 18 BRPM | OXYGEN SATURATION: 98 % | HEART RATE: 95 BPM | SYSTOLIC BLOOD PRESSURE: 112 MMHG | TEMPERATURE: 98 F | DIASTOLIC BLOOD PRESSURE: 60 MMHG

## 2017-12-12 DIAGNOSIS — R26.9 GAIT ABNORMALITY: ICD-10-CM

## 2017-12-12 DIAGNOSIS — M17.12 OSTEOARTHRITIS OF LEFT KNEE, UNSPECIFIED OSTEOARTHRITIS TYPE: Primary | ICD-10-CM

## 2017-12-12 DIAGNOSIS — Z96.652 HISTORY OF TOTAL LEFT KNEE REPLACEMENT: ICD-10-CM

## 2017-12-12 PROCEDURE — 99307 SBSQ NF CARE SF MDM 10: CPT | Performed by: NURSE PRACTITIONER

## 2017-12-12 RX ORDER — WARFARIN SODIUM 2 MG/1
2 TABLET ORAL NIGHTLY
COMMUNITY
End: 2018-07-13

## 2017-12-12 RX ORDER — WARFARIN SODIUM 5 MG/1
5 TABLET ORAL NIGHTLY
COMMUNITY
End: 2018-07-13

## 2017-12-12 NOTE — PROGRESS NOTES
Lucrecia Lee, 7/29/1950, 74year old, female    Chief Complaint:  Patient presents with:   Follow - Up: s/p Left TKA on 11.24.17       Subjective:  PMH significant for legally blind, Parkinson's dz, HA, CVA-like sxs, remote hx of seizure, arrhythmia, +tender, no guarding, no rebound tenderness. :Deferred  LYMPHATIC:no lymphedema  MUSCULOSKELETAL: no acute synovitis upper or lower extremity.    EXTREMITIES/VASCULAR:no cyanosis, clubbing or edema, radial pulses 2+ and dorsalis pedal pulses 2+  NEUROLOG 7. Consult palliative care for pain management recommendations     Osteopenia  1. Calcium 600 mg BID  2. Vitamin D 1,000u daily     Kidney stone  1.  Urocit K 10 meq TID    LASHAUN Pretty  12/12/2017  9:50 AM

## 2018-01-17 PROCEDURE — 83010 ASSAY OF HAPTOGLOBIN QUANT: CPT | Performed by: INTERNAL MEDICINE

## 2018-01-19 PROBLEM — M25.562 ACUTE PAIN OF LEFT KNEE: Status: ACTIVE | Noted: 2018-01-19

## 2018-02-05 PROCEDURE — 82570 ASSAY OF URINE CREATININE: CPT | Performed by: INTERNAL MEDICINE

## 2018-02-05 PROCEDURE — 86225 DNA ANTIBODY NATIVE: CPT | Performed by: INTERNAL MEDICINE

## 2018-02-05 PROCEDURE — 84156 ASSAY OF PROTEIN URINE: CPT | Performed by: INTERNAL MEDICINE

## 2018-02-05 PROCEDURE — 81003 URINALYSIS AUTO W/O SCOPE: CPT | Performed by: INTERNAL MEDICINE

## 2018-02-05 PROCEDURE — 86160 COMPLEMENT ANTIGEN: CPT | Performed by: INTERNAL MEDICINE

## 2018-02-17 PROBLEM — M25.669 POSTOPERATIVE STIFFNESS OF TOTAL KNEE REPLACEMENT, SUBSEQUENT ENCOUNTER: Status: ACTIVE | Noted: 2018-02-17

## 2018-02-17 PROBLEM — Z96.659 POSTOPERATIVE STIFFNESS OF TOTAL KNEE REPLACEMENT, SUBSEQUENT ENCOUNTER: Status: ACTIVE | Noted: 2018-02-17

## 2018-02-17 PROBLEM — T84.89XD POSTOPERATIVE STIFFNESS OF TOTAL KNEE REPLACEMENT, SUBSEQUENT ENCOUNTER: Status: ACTIVE | Noted: 2018-02-17

## 2018-03-06 ENCOUNTER — HOSPITAL ENCOUNTER (OUTPATIENT)
Dept: LAB | Facility: HOSPITAL | Age: 68
Discharge: HOME | End: 2018-03-06
Attending: INTERNAL MEDICINE
Payer: MEDICARE

## 2018-03-06 DIAGNOSIS — Z51.81 ENCOUNTER FOR THERAPEUTIC DRUG MONITORING: ICD-10-CM

## 2018-03-06 DIAGNOSIS — D68.59 HYPERCOAGULABLE STATE (HCC): ICD-10-CM

## 2018-03-06 DIAGNOSIS — Z79.01 LONG TERM CURRENT USE OF ANTICOAGULANT THERAPY: ICD-10-CM

## 2018-03-06 LAB — POC INR: 2.2 (ref 0.8–1.3)

## 2018-03-06 PROCEDURE — 85610 PROTHROMBIN TIME: CPT | Performed by: INTERNAL MEDICINE

## 2018-03-12 ENCOUNTER — HOSPITAL ENCOUNTER (OUTPATIENT)
Dept: LAB | Facility: HOSPITAL | Age: 68
Discharge: HOME | End: 2018-03-12
Attending: INTERNAL MEDICINE
Payer: MEDICARE

## 2018-03-12 DIAGNOSIS — Z79.01 LONG TERM CURRENT USE OF ANTICOAGULANT THERAPY: ICD-10-CM

## 2018-03-12 DIAGNOSIS — Z51.81 ENCOUNTER FOR THERAPEUTIC DRUG MONITORING: ICD-10-CM

## 2018-03-12 DIAGNOSIS — D68.59 HYPERCOAGULABLE STATE (HCC): ICD-10-CM

## 2018-03-12 LAB — POC INR: 1.9 (ref 0.8–1.3)

## 2018-03-12 PROCEDURE — 85610 PROTHROMBIN TIME: CPT | Performed by: INTERNAL MEDICINE

## 2018-03-13 PROBLEM — Z96.659 POSTOPERATIVE STIFFNESS OF TOTAL KNEE REPLACEMENT, SUBSEQUENT ENCOUNTER: Status: RESOLVED | Noted: 2018-02-17 | Resolved: 2018-03-13

## 2018-03-13 PROBLEM — D62 ACUTE BLOOD LOSS ANEMIA: Status: RESOLVED | Noted: 2017-12-10 | Resolved: 2018-03-13

## 2018-03-13 PROBLEM — M25.562 ACUTE PAIN OF LEFT KNEE: Status: RESOLVED | Noted: 2018-01-19 | Resolved: 2018-03-13

## 2018-03-13 PROBLEM — G89.18 POST-OP PAIN: Status: RESOLVED | Noted: 2017-11-30 | Resolved: 2018-03-13

## 2018-03-13 PROBLEM — R79.1 SUPRATHERAPEUTIC INR: Status: RESOLVED | Noted: 2017-12-10 | Resolved: 2018-03-13

## 2018-03-13 PROBLEM — M25.669 POSTOPERATIVE STIFFNESS OF TOTAL KNEE REPLACEMENT, SUBSEQUENT ENCOUNTER: Status: RESOLVED | Noted: 2018-02-17 | Resolved: 2018-03-13

## 2018-03-13 PROBLEM — T84.89XD POSTOPERATIVE STIFFNESS OF TOTAL KNEE REPLACEMENT, SUBSEQUENT ENCOUNTER: Status: RESOLVED | Noted: 2018-02-17 | Resolved: 2018-03-13

## 2018-03-13 PROBLEM — Z47.89 ORTHOPEDIC AFTERCARE: Status: RESOLVED | Noted: 2017-11-27 | Resolved: 2018-03-13

## 2018-03-29 ENCOUNTER — HOSPITAL ENCOUNTER (OUTPATIENT)
Dept: LAB | Facility: HOSPITAL | Age: 68
Discharge: HOME | End: 2018-03-29
Attending: INTERNAL MEDICINE
Payer: MEDICARE

## 2018-03-29 DIAGNOSIS — Z79.01 LONG TERM CURRENT USE OF ANTICOAGULANT THERAPY: ICD-10-CM

## 2018-03-29 DIAGNOSIS — Z51.81 ENCOUNTER FOR THERAPEUTIC DRUG MONITORING: ICD-10-CM

## 2018-03-29 DIAGNOSIS — D68.59 HYPERCOAGULABLE STATE (HCC): ICD-10-CM

## 2018-03-29 LAB — POC INR: 2.1 (ref 0.8–1.3)

## 2018-03-29 PROCEDURE — 85610 PROTHROMBIN TIME: CPT | Performed by: INTERNAL MEDICINE

## 2018-03-31 PROBLEM — M25.562 CHRONIC PAIN OF LEFT KNEE: Status: ACTIVE | Noted: 2018-03-31

## 2018-03-31 PROBLEM — G89.29 CHRONIC PAIN OF LEFT KNEE: Status: ACTIVE | Noted: 2018-03-31

## 2018-04-19 ENCOUNTER — HOSPITAL ENCOUNTER (OUTPATIENT)
Dept: LAB | Facility: HOSPITAL | Age: 68
Discharge: HOME | End: 2018-04-19
Attending: INTERNAL MEDICINE
Payer: MEDICARE

## 2018-04-19 DIAGNOSIS — Z79.01 LONG TERM CURRENT USE OF ANTICOAGULANT THERAPY: ICD-10-CM

## 2018-04-19 DIAGNOSIS — D68.59 HYPERCOAGULABLE STATE (HCC): ICD-10-CM

## 2018-04-19 DIAGNOSIS — Z51.81 ENCOUNTER FOR THERAPEUTIC DRUG MONITORING: ICD-10-CM

## 2018-04-19 PROCEDURE — 85610 PROTHROMBIN TIME: CPT | Performed by: INTERNAL MEDICINE

## 2018-05-17 PROCEDURE — 86225 DNA ANTIBODY NATIVE: CPT | Performed by: INTERNAL MEDICINE

## 2018-05-17 PROCEDURE — 87086 URINE CULTURE/COLONY COUNT: CPT | Performed by: INTERNAL MEDICINE

## 2018-05-17 PROCEDURE — 86160 COMPLEMENT ANTIGEN: CPT | Performed by: INTERNAL MEDICINE

## 2018-05-17 PROCEDURE — 84156 ASSAY OF PROTEIN URINE: CPT | Performed by: INTERNAL MEDICINE

## 2018-05-17 PROCEDURE — 82570 ASSAY OF URINE CREATININE: CPT | Performed by: INTERNAL MEDICINE

## 2018-05-17 PROCEDURE — 81001 URINALYSIS AUTO W/SCOPE: CPT | Performed by: INTERNAL MEDICINE

## 2018-05-23 ENCOUNTER — HOSPITAL ENCOUNTER (OUTPATIENT)
Dept: MAMMOGRAPHY | Facility: HOSPITAL | Age: 68
Discharge: HOME | End: 2018-05-23
Attending: SURGERY
Payer: MEDICARE

## 2018-05-23 DIAGNOSIS — Z90.11 S/P RIGHT MASTECTOMY: ICD-10-CM

## 2018-05-23 PROCEDURE — 77061 BREAST TOMOSYNTHESIS UNI: CPT | Performed by: SURGERY

## 2018-05-23 PROCEDURE — 77065 DX MAMMO INCL CAD UNI: CPT | Performed by: SURGERY

## 2018-05-30 ENCOUNTER — PRIOR ORIGINAL RECORDS (OUTPATIENT)
Dept: OTHER | Age: 68
End: 2018-05-30

## 2018-05-31 PROCEDURE — 36415 COLL VENOUS BLD VENIPUNCTURE: CPT | Performed by: INTERNAL MEDICINE

## 2018-05-31 PROCEDURE — 82570 ASSAY OF URINE CREATININE: CPT | Performed by: INTERNAL MEDICINE

## 2018-05-31 PROCEDURE — 81001 URINALYSIS AUTO W/SCOPE: CPT | Performed by: INTERNAL MEDICINE

## 2018-05-31 PROCEDURE — 84156 ASSAY OF PROTEIN URINE: CPT | Performed by: INTERNAL MEDICINE

## 2018-05-31 PROCEDURE — 87086 URINE CULTURE/COLONY COUNT: CPT | Performed by: INTERNAL MEDICINE

## 2018-06-11 ENCOUNTER — HOSPITAL ENCOUNTER (OUTPATIENT)
Dept: LAB | Facility: HOSPITAL | Age: 68
Discharge: HOME | End: 2018-06-11
Attending: INTERNAL MEDICINE
Payer: MEDICARE

## 2018-06-11 DIAGNOSIS — D68.59 HYPERCOAGULABLE STATE (HCC): ICD-10-CM

## 2018-06-11 DIAGNOSIS — Z79.01 LONG TERM CURRENT USE OF ANTICOAGULANT THERAPY: ICD-10-CM

## 2018-06-11 PROCEDURE — 85610 PROTHROMBIN TIME: CPT | Performed by: INTERNAL MEDICINE

## 2018-06-22 ENCOUNTER — HOSPITAL ENCOUNTER (OUTPATIENT)
Dept: LAB | Facility: HOSPITAL | Age: 68
Discharge: HOME | End: 2018-06-22
Attending: INTERNAL MEDICINE
Payer: MEDICARE

## 2018-06-22 DIAGNOSIS — Z79.01 LONG TERM CURRENT USE OF ANTICOAGULANT THERAPY: ICD-10-CM

## 2018-06-22 DIAGNOSIS — D68.59 HYPERCOAGULABLE STATE (HCC): ICD-10-CM

## 2018-06-22 PROCEDURE — 85610 PROTHROMBIN TIME: CPT | Performed by: INTERNAL MEDICINE

## 2018-07-11 ENCOUNTER — HOSPITAL ENCOUNTER (OUTPATIENT)
Dept: LAB | Facility: HOSPITAL | Age: 68
Discharge: HOME | End: 2018-07-11
Attending: INTERNAL MEDICINE
Payer: MEDICARE

## 2018-07-11 DIAGNOSIS — D68.59 HYPERCOAGULABLE STATE (HCC): ICD-10-CM

## 2018-07-11 DIAGNOSIS — Z79.01 LONG TERM CURRENT USE OF ANTICOAGULANT THERAPY: ICD-10-CM

## 2018-07-11 LAB — POC INR: 2.5 (ref 0.8–1.3)

## 2018-07-11 PROCEDURE — 85610 PROTHROMBIN TIME: CPT | Performed by: INTERNAL MEDICINE

## 2018-07-13 PROBLEM — I70.0 AORTIC ATHEROSCLEROSIS (HCC): Status: ACTIVE | Noted: 2018-07-13

## 2018-07-13 PROBLEM — I70.8 AORTO-ILIAC ATHEROSCLEROSIS  (HCC): Status: ACTIVE | Noted: 2018-07-13

## 2018-07-13 PROBLEM — I70.0 AORTIC ATHEROSCLEROSIS: Status: ACTIVE | Noted: 2018-07-13

## 2018-07-13 PROBLEM — I70.0 AORTO-ILIAC ATHEROSCLEROSIS (HCC): Status: ACTIVE | Noted: 2018-07-13

## 2018-07-13 PROBLEM — R73.01 IMPAIRED FASTING GLUCOSE: Status: ACTIVE | Noted: 2018-07-13

## 2018-07-13 PROBLEM — I70.8 AORTO-ILIAC ATHEROSCLEROSIS (HCC): Status: ACTIVE | Noted: 2018-07-13

## 2018-07-13 PROBLEM — I70.0 AORTO-ILIAC ATHEROSCLEROSIS  (HCC): Status: ACTIVE | Noted: 2018-07-13

## 2018-07-13 PROBLEM — I70.8 AORTO-ILIAC ATHEROSCLEROSIS: Status: ACTIVE | Noted: 2018-07-13

## 2018-07-13 PROBLEM — I70.0 AORTO-ILIAC ATHEROSCLEROSIS: Status: ACTIVE | Noted: 2018-07-13

## 2018-07-18 ENCOUNTER — HOSPITAL ENCOUNTER (OUTPATIENT)
Dept: LAB | Facility: HOSPITAL | Age: 68
Discharge: HOME | End: 2018-07-18
Attending: INTERNAL MEDICINE
Payer: MEDICARE

## 2018-07-18 DIAGNOSIS — D68.59 HYPERCOAGULABLE STATE (HCC): ICD-10-CM

## 2018-07-18 DIAGNOSIS — Z79.01 LONG TERM CURRENT USE OF ANTICOAGULANT THERAPY: ICD-10-CM

## 2018-07-18 LAB — POC INR: 4.3 (ref 0.8–1.3)

## 2018-07-18 PROCEDURE — 85610 PROTHROMBIN TIME: CPT | Performed by: INTERNAL MEDICINE

## 2018-07-25 ENCOUNTER — HOSPITAL ENCOUNTER (OUTPATIENT)
Dept: LAB | Facility: HOSPITAL | Age: 68
Discharge: HOME | End: 2018-07-25
Attending: INTERNAL MEDICINE
Payer: MEDICARE

## 2018-07-25 DIAGNOSIS — Z79.01 LONG TERM CURRENT USE OF ANTICOAGULANT THERAPY: ICD-10-CM

## 2018-07-25 DIAGNOSIS — D68.59 HYPERCOAGULABLE STATE (HCC): ICD-10-CM

## 2018-07-25 LAB — POC INR: 1.9 (ref 0.8–1.3)

## 2018-07-25 PROCEDURE — 85610 PROTHROMBIN TIME: CPT | Performed by: INTERNAL MEDICINE

## 2018-07-25 RX ORDER — MINERAL OIL, WHITE PETROLATUM .03; .94 G/G; G/G
OINTMENT OPHTHALMIC 2 TIMES DAILY
COMMUNITY

## 2018-07-25 RX ORDER — PHYTONADIONE (VIT K1) 100 MCG
50 TABLET ORAL DAILY
Status: ON HOLD | COMMUNITY
End: 2019-12-10

## 2018-07-25 RX ORDER — SODIUM CHLORIDE, SODIUM LACTATE, POTASSIUM CHLORIDE, CALCIUM CHLORIDE 600; 310; 30; 20 MG/100ML; MG/100ML; MG/100ML; MG/100ML
INJECTION, SOLUTION INTRAVENOUS CONTINUOUS
Status: CANCELLED | OUTPATIENT
Start: 2018-07-25

## 2018-07-25 NOTE — OR NURSING
Left message with Nadira Parnell at Dr. Harriet Alpers office. Patient states suppose to have esophagogastroduodenoscopy & colonoscopy.

## 2018-07-26 ENCOUNTER — LABORATORY ENCOUNTER (OUTPATIENT)
Dept: LAB | Facility: HOSPITAL | Age: 68
End: 2018-07-26
Payer: MEDICARE

## 2018-07-26 DIAGNOSIS — R19.5 OCCULT BLOOD IN STOOLS: ICD-10-CM

## 2018-07-26 LAB
CHLORIDE SERPL-SCNC: 109 MMOL/L (ref 101–111)
CO2 SERPL-SCNC: 29 MMOL/L (ref 22–32)
POTASSIUM SERPL-SCNC: 3.7 MMOL/L (ref 3.6–5.1)
SODIUM SERPL-SCNC: 144 MMOL/L (ref 136–144)

## 2018-07-26 PROCEDURE — 36591 DRAW BLOOD OFF VENOUS DEVICE: CPT

## 2018-07-26 PROCEDURE — 80051 ELECTROLYTE PANEL: CPT

## 2018-08-01 ENCOUNTER — ANESTHESIA EVENT (OUTPATIENT)
Dept: ENDOSCOPY | Facility: HOSPITAL | Age: 68
End: 2018-08-01
Payer: MEDICARE

## 2018-08-01 NOTE — H&P
PRE-PROCEDURE UPDATE    HPI: Grecia Levy is a 79year old female. 7/29/1950. Patient presents for a colonoscopy and gastroscopy.      ALLERGIES:   Amitriptyline Hcl       RASH, SWELLING    Comment:Throat closes/swells, rash  Benlysta [Belimumab] 12/11   • Hepatitis 1970's   • High blood pressure    • History of blood transfusion    • Hypercoagulable state ([facility] [address]    • Muscle weakness     right sided weakness   • Neuropathy     hands and feet   • Osteoarthritis     generalized   • Parkinson's disease inserted & removed several months               later  No date: OTHER SURGICAL HISTORY      Comment: lymph node biopsy  No date: PORT, INDWELLING, IMP      Comment: power port left upper chest  12/6/11: SM INTESTINAL IMAGE CAPSULE      Comment: Large super 0.3   nRBC/100 WBC      /100WBC  0.00   ALT (SGPT)      14 - 54 U/L  57 (H)   AST (SGOT)      15 - 41 U/L  27   Albumin      3.5 - 4.8 g/dL  3.6   ALKALINE PHOSPHATASE      50 - 130 U/L  99   Total Bilirubin      0.10 - 2.00 mg/dL  0.22   TOTAL PROTEIN gall bladder, pancreas, and adrenal glands are unremarkable. There is   no biliary ductal dilatation. The kidneys enhance symmetrically. There is a simple cyst in the left kidney.  There are tiny   punctate bilateral renal calculi, with no hydronephrosis ap Gastroscopy with possible biopsy--the planned endoscopic procedure, indications, risks, benefits and post-op precautions were  discussed and questions were answered in the pre-operative area. May Joshi MD

## 2018-08-02 ENCOUNTER — SURGERY (OUTPATIENT)
Age: 68
End: 2018-08-02

## 2018-08-02 ENCOUNTER — ANESTHESIA (OUTPATIENT)
Dept: ENDOSCOPY | Facility: HOSPITAL | Age: 68
End: 2018-08-02
Payer: MEDICARE

## 2018-08-02 ENCOUNTER — HOSPITAL ENCOUNTER (OUTPATIENT)
Facility: HOSPITAL | Age: 68
Setting detail: HOSPITAL OUTPATIENT SURGERY
Discharge: HOME | End: 2018-08-02
Attending: INTERNAL MEDICINE | Admitting: INTERNAL MEDICINE
Payer: MEDICARE

## 2018-08-02 VITALS
OXYGEN SATURATION: 100 % | HEIGHT: 60 IN | TEMPERATURE: 98 F | SYSTOLIC BLOOD PRESSURE: 125 MMHG | BODY MASS INDEX: 26.5 KG/M2 | DIASTOLIC BLOOD PRESSURE: 74 MMHG | WEIGHT: 135 LBS | HEART RATE: 54 BPM | RESPIRATION RATE: 18 BRPM

## 2018-08-02 DIAGNOSIS — Z79.01 LONG TERM CURRENT USE OF ANTICOAGULANT THERAPY: ICD-10-CM

## 2018-08-02 DIAGNOSIS — R19.5 OCCULT BLOOD IN STOOLS: Primary | ICD-10-CM

## 2018-08-02 LAB — INR: 1.1 (ref 0.8–1.3)

## 2018-08-02 PROCEDURE — 0DJD8ZZ INSPECTION OF LOWER INTESTINAL TRACT, VIA NATURAL OR ARTIFICIAL OPENING ENDOSCOPIC: ICD-10-PCS | Performed by: INTERNAL MEDICINE

## 2018-08-02 PROCEDURE — 0D748ZZ DILATION OF ESOPHAGOGASTRIC JUNCTION, VIA NATURAL OR ARTIFICIAL OPENING ENDOSCOPIC: ICD-10-PCS | Performed by: INTERNAL MEDICINE

## 2018-08-02 RX ORDER — SODIUM CHLORIDE, SODIUM LACTATE, POTASSIUM CHLORIDE, CALCIUM CHLORIDE 600; 310; 30; 20 MG/100ML; MG/100ML; MG/100ML; MG/100ML
INJECTION, SOLUTION INTRAVENOUS CONTINUOUS
Status: DISCONTINUED | OUTPATIENT
Start: 2018-08-02 | End: 2018-08-02

## 2018-08-02 NOTE — ANESTHESIA POSTPROCEDURE EVALUATION
[facility]    Tete Agarwal Patient Status:  Hospital Outpatient Surgery   Age/Gender 79year old female MRN [id number]   Location [facility]. Attending Francisco Landa MD   Hosp Day # 0 PCP MD Carrie Loo

## 2018-08-02 NOTE — OPERATIVE REPORT
ENDOSCOPY OPERATIVE REPORT    Patient Name:  Kike Gonzales  Medical Record #: [id number]  YOB: 1950  Date of Procedure: 8/2/2018    Preoperative Diagnosis: Anemia, Occult blood in stool, Dysphagia    Postoperative Diagnosis:Mild distal withdrawn. Upon withdrawl of the endoscope re-examination of the mucosal surface showed no other abnormalities, the residual air was aspirated and the endoscope was then withdrawn.  The procedure was well tolerated and upon completion and throughtout the   9.3   Eosinophils %      %   1.5   Basophils %      %   0.3   nRBC/100 WBC      /100WBC   0.00   ALT (SGPT)      14 - 54 U/L   57 (H)   AST (SGOT)      15 - 41 U/L   27   Albumin      3.5 - 4.8 g/dL   3.6   ALKALINE PHOSPHATASE      50 - 130 U/L   99   T 11.5                LFT's     wnl  10/13     CT AP   FINDINGS: The liver, spleen, gall bladder, pancreas, and adrenal glands are unremarkable. There is   no biliary ductal dilatation. The kidneys enhance symmetrically.  There is a simple cyst in the left ki w/u negative, may be adhesive/chronic pain/fibromyalgia? ?  Fibromyalgia  Thrombocytopenia  Personal History of Breast Cancer  SLE  Hypercoagulable state on Coumadin      PLAN:  Resume coumadin today.   Defer further occult blood testing  If the patient othe

## 2018-08-02 NOTE — ANESTHESIA PREPROCEDURE EVALUATION
PRE-OP EVALUATION    Patient Name: Romy Roberson    Pre-op Diagnosis: Occult blood in stools [R19.5]  Long term current use of anticoagulant therapy [Z79.01]    Procedure(s):  COLONOSCOPY, Esophagogastroduodenoscopy      Surgeon(s) and Role:     * Ho Disp: 385 g Rfl: 3   potassium citrate-citric acid 1100-334 MG/5ML Oral Solution Take 5 mL (10 mEq total) by mouth 3 (three) times daily with meals. Disp: 473 mL Rfl: 10   Multiple Vitamin (TAB-A-JEANIE) Oral Tab Take 1 tablet by mouth daily.  Disp:  Rfl: seizures    (+) headaches                 Past Surgical History:  No date: ARTHROSCOPY OF JOINT UNLISTED Left      Comment: knee  1991: CHEMOTHERAPY  8/8/11: COLONOSCOPY,DIAGNOSTIC      Comment: Dr. Yumiko Weiner, wnl  12/9/2010: CYSTOURETHROSCOPY      Comment: pylori  07/13/2017: UPPER GI ENDOSCOPY,DIAGNOSIS      Comment: mild esophageal ring, dilated 18-20 mm,                proximal esophageal and gastric bx negative     Smoking status: Former Smoker  1.00 Packs/day  For 1.00 Years     Types: Cigarettes    Verneice Rosalind

## 2018-08-08 PROCEDURE — 86160 COMPLEMENT ANTIGEN: CPT | Performed by: INTERNAL MEDICINE

## 2018-08-08 PROCEDURE — 84156 ASSAY OF PROTEIN URINE: CPT | Performed by: INTERNAL MEDICINE

## 2018-08-08 PROCEDURE — 86225 DNA ANTIBODY NATIVE: CPT | Performed by: INTERNAL MEDICINE

## 2018-08-08 PROCEDURE — 81003 URINALYSIS AUTO W/O SCOPE: CPT | Performed by: INTERNAL MEDICINE

## 2018-08-08 PROCEDURE — 82570 ASSAY OF URINE CREATININE: CPT | Performed by: INTERNAL MEDICINE

## 2018-08-20 ENCOUNTER — HOSPITAL ENCOUNTER (OUTPATIENT)
Dept: LAB | Facility: HOSPITAL | Age: 68
Discharge: HOME | End: 2018-08-20
Attending: INTERNAL MEDICINE
Payer: MEDICARE

## 2018-08-20 DIAGNOSIS — Z79.01 LONG TERM CURRENT USE OF ANTICOAGULANT THERAPY: ICD-10-CM

## 2018-08-20 DIAGNOSIS — D68.59 HYPERCOAGULABLE STATE (HCC): ICD-10-CM

## 2018-08-20 LAB — POC INR: 2.4 (ref 0.8–1.3)

## 2018-08-20 PROCEDURE — 85610 PROTHROMBIN TIME: CPT | Performed by: INTERNAL MEDICINE

## 2018-08-27 ENCOUNTER — HOSPITAL ENCOUNTER (OUTPATIENT)
Dept: LAB | Facility: HOSPITAL | Age: 68
Discharge: HOME | End: 2018-08-27
Attending: INTERNAL MEDICINE
Payer: MEDICARE

## 2018-08-27 DIAGNOSIS — Z79.01 LONG TERM CURRENT USE OF ANTICOAGULANT THERAPY: ICD-10-CM

## 2018-08-27 DIAGNOSIS — D68.59 HYPERCOAGULABLE STATE (HCC): ICD-10-CM

## 2018-08-27 LAB — POC INR: 2.1 (ref 0.8–1.3)

## 2018-08-27 PROCEDURE — 85610 PROTHROMBIN TIME: CPT | Performed by: INTERNAL MEDICINE

## 2018-09-10 ENCOUNTER — HOSPITAL ENCOUNTER (OUTPATIENT)
Dept: LAB | Facility: HOSPITAL | Age: 68
Discharge: HOME | End: 2018-09-10
Attending: INTERNAL MEDICINE
Payer: MEDICARE

## 2018-09-10 DIAGNOSIS — Z79.01 LONG TERM CURRENT USE OF ANTICOAGULANT THERAPY: ICD-10-CM

## 2018-09-10 DIAGNOSIS — D68.59 HYPERCOAGULABLE STATE (HCC): ICD-10-CM

## 2018-09-10 LAB — POC INR: 2.6 (ref 0.8–1.3)

## 2018-09-10 PROCEDURE — 85610 PROTHROMBIN TIME: CPT | Performed by: INTERNAL MEDICINE

## 2018-10-10 ENCOUNTER — HOSPITAL ENCOUNTER (OUTPATIENT)
Dept: LAB | Facility: HOSPITAL | Age: 68
Discharge: HOME | End: 2018-10-10
Attending: INTERNAL MEDICINE
Payer: MEDICARE

## 2018-10-10 DIAGNOSIS — Z79.01 LONG TERM CURRENT USE OF ANTICOAGULANT THERAPY: ICD-10-CM

## 2018-10-10 DIAGNOSIS — D68.59 HYPERCOAGULABLE STATE (HCC): ICD-10-CM

## 2018-10-10 PROCEDURE — 85610 PROTHROMBIN TIME: CPT | Performed by: INTERNAL MEDICINE

## 2018-11-23 ENCOUNTER — HOSPITAL ENCOUNTER (OUTPATIENT)
Dept: LAB | Facility: HOSPITAL | Age: 68
Discharge: HOME | End: 2018-11-23
Attending: INTERNAL MEDICINE
Payer: MEDICARE

## 2018-11-23 DIAGNOSIS — Z79.01 LONG TERM CURRENT USE OF ANTICOAGULANT THERAPY: ICD-10-CM

## 2018-11-23 DIAGNOSIS — D68.59 HYPERCOAGULABLE STATE (HCC): ICD-10-CM

## 2018-11-23 PROCEDURE — 85610 PROTHROMBIN TIME: CPT | Performed by: INTERNAL MEDICINE

## 2018-11-28 ENCOUNTER — PRIOR ORIGINAL RECORDS (OUTPATIENT)
Dept: OTHER | Age: 68
End: 2018-11-28

## 2018-11-28 ENCOUNTER — HOSPITAL ENCOUNTER (OUTPATIENT)
Dept: LAB | Facility: HOSPITAL | Age: 68
Discharge: HOME | End: 2018-11-28
Attending: INTERNAL MEDICINE
Payer: MEDICARE

## 2018-11-28 DIAGNOSIS — D68.59 HYPERCOAGULABLE STATE (HCC): ICD-10-CM

## 2018-11-28 DIAGNOSIS — Z79.01 LONG TERM CURRENT USE OF ANTICOAGULANT THERAPY: ICD-10-CM

## 2018-11-28 PROCEDURE — 85610 PROTHROMBIN TIME: CPT | Performed by: INTERNAL MEDICINE

## 2018-12-31 ENCOUNTER — PRIOR ORIGINAL RECORDS (OUTPATIENT)
Dept: OTHER | Age: 68
End: 2018-12-31

## 2019-01-09 ENCOUNTER — HOSPITAL ENCOUNTER (OUTPATIENT)
Dept: LAB | Facility: HOSPITAL | Age: 69
Discharge: HOME | End: 2019-01-09
Attending: INTERNAL MEDICINE
Payer: MEDICARE

## 2019-01-09 DIAGNOSIS — Z79.01 LONG TERM CURRENT USE OF ANTICOAGULANT THERAPY: ICD-10-CM

## 2019-01-09 DIAGNOSIS — D68.59 HYPERCOAGULABLE STATE (HCC): ICD-10-CM

## 2019-01-09 LAB — POC INR: 3.1 (ref 0.8–1.3)

## 2019-01-09 PROCEDURE — 85610 PROTHROMBIN TIME: CPT | Performed by: INTERNAL MEDICINE

## 2019-01-23 ENCOUNTER — APPOINTMENT (OUTPATIENT)
Dept: GENERAL RADIOLOGY | Facility: HOSPITAL | Age: 69
DRG: 202 | End: 2019-01-23
Attending: EMERGENCY MEDICINE
Payer: MEDICARE

## 2019-01-23 ENCOUNTER — HOSPITAL ENCOUNTER (INPATIENT)
Facility: HOSPITAL | Age: 69
LOS: 3 days | Discharge: HOME HEALTH | DRG: 202 | End: 2019-01-26
Attending: EMERGENCY MEDICINE | Admitting: INTERNAL MEDICINE
Payer: MEDICARE

## 2019-01-23 DIAGNOSIS — J45.41 MODERATE PERSISTENT ASTHMA WITH EXACERBATION: Primary | ICD-10-CM

## 2019-01-23 DIAGNOSIS — J18.9 SEPSIS DUE TO PNEUMONIA (HCC): ICD-10-CM

## 2019-01-23 DIAGNOSIS — A41.9 SEPSIS DUE TO PNEUMONIA (HCC): ICD-10-CM

## 2019-01-23 PROBLEM — D64.9 ANEMIA: Status: ACTIVE | Noted: 2019-01-23

## 2019-01-23 PROBLEM — E87.6 HYPOKALEMIA: Status: ACTIVE | Noted: 2019-01-23

## 2019-01-23 LAB
ALBUMIN SERPL-MCNC: 3.5 G/DL (ref 3.1–4.5)
ALBUMIN/GLOB SERPL: 0.9 {RATIO} (ref 1–2)
ALP LIVER SERPL-CCNC: 76 U/L (ref 55–142)
ALT SERPL-CCNC: 21 U/L (ref 14–54)
ANION GAP SERPL CALC-SCNC: 12 MMOL/L (ref 0–18)
AST SERPL-CCNC: 20 U/L (ref 15–41)
ATRIAL RATE: 97 BPM
BASOPHILS # BLD AUTO: 0.02 X10(3) UL (ref 0–0.1)
BASOPHILS NFR BLD AUTO: 0.3 %
BILIRUB SERPL-MCNC: 0.3 MG/DL (ref 0.1–2)
BILIRUB UR QL STRIP.AUTO: NEGATIVE
BUN BLD-MCNC: 11 MG/DL (ref 8–20)
BUN/CREAT SERPL: 12.6 (ref 10–20)
CALCIUM BLD-MCNC: 8.9 MG/DL (ref 8.3–10.3)
CHLORIDE SERPL-SCNC: 109 MMOL/L (ref 101–111)
CLARITY UR REFRACT.AUTO: CLEAR
CO2 SERPL-SCNC: 23 MMOL/L (ref 22–32)
COLOR UR AUTO: YELLOW
CREAT BLD-MCNC: 0.87 MG/DL (ref 0.55–1.02)
D-DIMER: 0.36 UG/ML FEU (ref 0–0.49)
EOSINOPHIL # BLD AUTO: 0.17 X10(3) UL (ref 0–0.3)
EOSINOPHIL NFR BLD AUTO: 2.4 %
ERYTHROCYTE [DISTWIDTH] IN BLOOD BY AUTOMATED COUNT: 14.1 % (ref 11.5–16)
FLUAV + FLUBV RNA SPEC NAA+PROBE: NEGATIVE
GLOBULIN PLAS-MCNC: 4.1 G/DL (ref 2.8–4.4)
GLUCOSE BLD-MCNC: 95 MG/DL (ref 70–99)
GLUCOSE UR STRIP.AUTO-MCNC: NEGATIVE MG/DL
HCT VFR BLD AUTO: 35.1 % (ref 34–50)
HGB BLD-MCNC: 11.7 G/DL (ref 12–16)
IMMATURE GRANULOCYTE COUNT: 0.02 X10(3) UL (ref 0–1)
IMMATURE GRANULOCYTE RATIO %: 0.3 %
INR BLD: 1.58 (ref 0.9–1.1)
KETONES UR STRIP.AUTO-MCNC: 20 MG/DL
LACTIC ACID: 2 MMOL/L (ref 0.5–2)
LACTIC ACID: 2.8 MMOL/L (ref 0.5–2)
LACTIC ACID: 3.4 MMOL/L (ref 0.5–2)
LYMPHOCYTES # BLD AUTO: 1.52 X10(3) UL (ref 0.9–4)
LYMPHOCYTES NFR BLD AUTO: 21.4 %
M PROTEIN MFR SERPL ELPH: 7.6 G/DL (ref 6.4–8.2)
MCH RBC QN AUTO: 26.5 PG (ref 27–33.2)
MCHC RBC AUTO-ENTMCNC: 33.3 G/DL (ref 31–37)
MCV RBC AUTO: 79.4 FL (ref 81–100)
MONOCYTES # BLD AUTO: 0.93 X10(3) UL (ref 0.1–1)
MONOCYTES NFR BLD AUTO: 13.1 %
NEUTROPHIL ABS PRELIM: 4.45 X10 (3) UL (ref 1.3–6.7)
NEUTROPHILS # BLD AUTO: 4.45 X10(3) UL (ref 1.3–6.7)
NEUTROPHILS NFR BLD AUTO: 62.5 %
NITRITE UR QL STRIP.AUTO: NEGATIVE
OSMOLALITY SERPL CALC.SUM OF ELEC: 297 MOSM/KG (ref 275–295)
P AXIS: 78 DEGREES
P-R INTERVAL: 144 MS
PH UR STRIP.AUTO: 6 [PH] (ref 4.5–8)
PLATELET # BLD AUTO: 198 10(3)UL (ref 150–450)
POTASSIUM SERPL-SCNC: 3 MMOL/L (ref 3.6–5.1)
PROT UR STRIP.AUTO-MCNC: 30 MG/DL
PSA SERPL DL<=0.01 NG/ML-MCNC: 19.4 SECONDS (ref 12.4–14.7)
Q-T INTERVAL: 374 MS
QRS DURATION: 94 MS
QTC CALCULATION (BEZET): 474 MS
R AXIS: -18 DEGREES
RBC # BLD AUTO: 4.42 X10(6)UL (ref 3.8–5.1)
RBC UR QL AUTO: NEGATIVE
RED CELL DISTRIBUTION WIDTH-SD: 40.9 FL (ref 35.1–46.3)
SODIUM SERPL-SCNC: 144 MMOL/L (ref 136–144)
SP GR UR STRIP.AUTO: 1.01 (ref 1–1.03)
T AXIS: 55 DEGREES
UROBILINOGEN UR STRIP.AUTO-MCNC: <2 MG/DL
VENTRICULAR RATE: 97 BPM
WBC # BLD AUTO: 7.1 X10(3) UL (ref 4–13)

## 2019-01-23 PROCEDURE — 99285 EMERGENCY DEPT VISIT HI MDM: CPT | Performed by: EMERGENCY MEDICINE

## 2019-01-23 PROCEDURE — 86160 COMPLEMENT ANTIGEN: CPT | Performed by: INTERNAL MEDICINE

## 2019-01-23 PROCEDURE — 36415 COLL VENOUS BLD VENIPUNCTURE: CPT | Performed by: EMERGENCY MEDICINE

## 2019-01-23 PROCEDURE — 94645 CONT INHLJ TX EACH ADDL HOUR: CPT

## 2019-01-23 PROCEDURE — 87040 BLOOD CULTURE FOR BACTERIA: CPT | Performed by: EMERGENCY MEDICINE

## 2019-01-23 PROCEDURE — 87999 UNLISTED MICROBIOLOGY PX: CPT

## 2019-01-23 PROCEDURE — 81001 URINALYSIS AUTO W/SCOPE: CPT | Performed by: EMERGENCY MEDICINE

## 2019-01-23 PROCEDURE — 71045 X-RAY EXAM CHEST 1 VIEW: CPT | Performed by: EMERGENCY MEDICINE

## 2019-01-23 PROCEDURE — 96367 TX/PROPH/DG ADDL SEQ IV INF: CPT | Performed by: EMERGENCY MEDICINE

## 2019-01-23 PROCEDURE — 87581 M.PNEUMON DNA AMP PROBE: CPT | Performed by: NURSE PRACTITIONER

## 2019-01-23 PROCEDURE — 96365 THER/PROPH/DIAG IV INF INIT: CPT | Performed by: EMERGENCY MEDICINE

## 2019-01-23 PROCEDURE — 84145 PROCALCITONIN (PCT): CPT | Performed by: HOSPITALIST

## 2019-01-23 PROCEDURE — 94640 AIRWAY INHALATION TREATMENT: CPT

## 2019-01-23 PROCEDURE — 81001 URINALYSIS AUTO W/SCOPE: CPT | Performed by: INTERNAL MEDICINE

## 2019-01-23 PROCEDURE — 87486 CHLMYD PNEUM DNA AMP PROBE: CPT | Performed by: NURSE PRACTITIONER

## 2019-01-23 PROCEDURE — 83605 ASSAY OF LACTIC ACID: CPT | Performed by: EMERGENCY MEDICINE

## 2019-01-23 PROCEDURE — 82570 ASSAY OF URINE CREATININE: CPT | Performed by: INTERNAL MEDICINE

## 2019-01-23 PROCEDURE — 87798 DETECT AGENT NOS DNA AMP: CPT | Performed by: EMERGENCY MEDICINE

## 2019-01-23 PROCEDURE — 93010 ELECTROCARDIOGRAM REPORT: CPT | Performed by: EMERGENCY MEDICINE

## 2019-01-23 PROCEDURE — 83605 ASSAY OF LACTIC ACID: CPT | Performed by: HOSPITALIST

## 2019-01-23 PROCEDURE — 87502 INFLUENZA DNA AMP PROBE: CPT | Performed by: EMERGENCY MEDICINE

## 2019-01-23 PROCEDURE — 84156 ASSAY OF PROTEIN URINE: CPT | Performed by: INTERNAL MEDICINE

## 2019-01-23 PROCEDURE — 94644 CONT INHLJ TX 1ST HOUR: CPT

## 2019-01-23 PROCEDURE — 85378 FIBRIN DEGRADE SEMIQUANT: CPT | Performed by: EMERGENCY MEDICINE

## 2019-01-23 PROCEDURE — 85610 PROTHROMBIN TIME: CPT | Performed by: EMERGENCY MEDICINE

## 2019-01-23 PROCEDURE — 93005 ELECTROCARDIOGRAM TRACING: CPT

## 2019-01-23 PROCEDURE — 87798 DETECT AGENT NOS DNA AMP: CPT | Performed by: NURSE PRACTITIONER

## 2019-01-23 PROCEDURE — 85025 COMPLETE CBC W/AUTO DIFF WBC: CPT | Performed by: EMERGENCY MEDICINE

## 2019-01-23 PROCEDURE — 96361 HYDRATE IV INFUSION ADD-ON: CPT | Performed by: EMERGENCY MEDICINE

## 2019-01-23 PROCEDURE — 80053 COMPREHEN METABOLIC PANEL: CPT | Performed by: EMERGENCY MEDICINE

## 2019-01-23 PROCEDURE — 87086 URINE CULTURE/COLONY COUNT: CPT | Performed by: INTERNAL MEDICINE

## 2019-01-23 PROCEDURE — 87633 RESP VIRUS 12-25 TARGETS: CPT | Performed by: NURSE PRACTITIONER

## 2019-01-23 PROCEDURE — 86225 DNA ANTIBODY NATIVE: CPT | Performed by: INTERNAL MEDICINE

## 2019-01-23 RX ORDER — POTASSIUM CHLORIDE 20 MEQ/1
40 TABLET, EXTENDED RELEASE ORAL ONCE
Status: COMPLETED | OUTPATIENT
Start: 2019-01-23 | End: 2019-01-23

## 2019-01-23 RX ORDER — WARFARIN SODIUM 7.5 MG/1
7.5 TABLET ORAL SEE ADMIN INSTRUCTIONS
Status: ON HOLD | COMMUNITY
End: 2019-12-10

## 2019-01-23 RX ORDER — SODIUM CHLORIDE 9 MG/ML
INJECTION, SOLUTION INTRAVENOUS CONTINUOUS
Status: DISCONTINUED | OUTPATIENT
Start: 2019-01-23 | End: 2019-01-23

## 2019-01-23 RX ORDER — TRAMADOL HYDROCHLORIDE 50 MG/1
50 TABLET ORAL 2 TIMES DAILY
Status: DISCONTINUED | OUTPATIENT
Start: 2019-01-23 | End: 2019-01-26

## 2019-01-23 RX ORDER — WARFARIN SODIUM 7.5 MG/1
7.5 TABLET ORAL
Status: COMPLETED | OUTPATIENT
Start: 2019-01-23 | End: 2019-01-24

## 2019-01-23 RX ORDER — IPRATROPIUM BROMIDE AND ALBUTEROL SULFATE 2.5; .5 MG/3ML; MG/3ML
3 SOLUTION RESPIRATORY (INHALATION) EVERY 2 HOUR PRN
Status: DISCONTINUED | OUTPATIENT
Start: 2019-01-23 | End: 2019-01-26

## 2019-01-23 RX ORDER — IPRATROPIUM BROMIDE AND ALBUTEROL SULFATE 2.5; .5 MG/3ML; MG/3ML
3 SOLUTION RESPIRATORY (INHALATION)
Status: DISCONTINUED | OUTPATIENT
Start: 2019-01-23 | End: 2019-01-26

## 2019-01-23 RX ORDER — MYCOPHENOLATE MOFETIL 250 MG/1
1500 CAPSULE ORAL 2 TIMES DAILY
Status: DISCONTINUED | OUTPATIENT
Start: 2019-01-23 | End: 2019-01-26

## 2019-01-23 RX ORDER — ACETAMINOPHEN 500 MG
500 TABLET ORAL EVERY 6 HOURS PRN
Status: DISCONTINUED | OUTPATIENT
Start: 2019-01-23 | End: 2019-01-26

## 2019-01-23 RX ORDER — GABAPENTIN 300 MG/1
300 CAPSULE ORAL 3 TIMES DAILY
Status: DISCONTINUED | OUTPATIENT
Start: 2019-01-23 | End: 2019-01-26

## 2019-01-23 RX ORDER — SODIUM CHLORIDE, SODIUM LACTATE, POTASSIUM CHLORIDE, CALCIUM CHLORIDE 600; 310; 30; 20 MG/100ML; MG/100ML; MG/100ML; MG/100ML
INJECTION, SOLUTION INTRAVENOUS CONTINUOUS
Status: DISCONTINUED | OUTPATIENT
Start: 2019-01-23 | End: 2019-01-24

## 2019-01-23 RX ORDER — MELATONIN
400 DAILY
Status: DISCONTINUED | OUTPATIENT
Start: 2019-01-24 | End: 2019-01-26

## 2019-01-23 RX ORDER — FAMOTIDINE 20 MG/1
40 TABLET ORAL DAILY
Status: DISCONTINUED | OUTPATIENT
Start: 2019-01-24 | End: 2019-01-26

## 2019-01-23 RX ORDER — WARFARIN SODIUM 5 MG/1
5 TABLET ORAL SEE ADMIN INSTRUCTIONS
COMMUNITY
End: 2019-07-16

## 2019-01-23 RX ORDER — FAMOTIDINE 40 MG/1
40 TABLET, FILM COATED ORAL DAILY
COMMUNITY
End: 2019-07-16

## 2019-01-23 NOTE — ED PROVIDER NOTES
Patient Seen in: [facility] Emergency Department    History   Patient presents with:  Dyspnea FAUSTO SOB (respiratory)    Stated Complaint:     HPI    Patient is a 72-year-old woman with multiple medical problems including lupus, asthma, remote breast \"stroke-like symptoms\"-no residual effects; no diagnostic findings several years ago   • Systemic lupus ([facility] [address]     SLE   • Visual impairment     glasses   • Vitamin D deficiency        Past Surgical History:   Procedure Laterality Date   • ARTHROSCOPY OF lymph node biopsy   • PORT, INDWELLING, IMP      power port left upper chest   • SM INTESTINAL IMAGE CAPSULE  12/6/11    Large superficial ulceration along the greater curvature with stigmata of recent bleeding, SB otherwise wnl   • TONSILLECTOMY     • UPP air movement  Cardiac:  tachycardia. No murmurs. Regular rate and rhythm. Abdomen: Soft and nontender throughout. No rebound or guarding  Extremities: On the left lower extremity.   No significant edema  Skin: No rashes, no pallor  Neuro: Awake oriented Abnormality         Status                     ---------                               -----------         ------                     CBC W/ DIFFERENTIAL[[id number]]          Abnormal            Final result                 Please view results for these jorge alberto pneumonia ([facility] [address]  Lupus  Disposition:  Admit  1/23/2019  7:07 pm    Follow-up:  No follow-up provider specified.       Medications Prescribed:  Current Discharge Medication List        Present on Admission  Date Reviewed: 1/9/2019          ICD-10-CM Noted PO

## 2019-01-23 NOTE — ED INITIAL ASSESSMENT (HPI)
Pt sent from [facility] for wheezing and FAUSTO. Pt states fever x 1 day tmax 100.7.   Two neb treatments prior to arrival.

## 2019-01-24 LAB
ADENOVIRUS PCR:: NEGATIVE
ANION GAP SERPL CALC-SCNC: 5 MMOL/L (ref 0–18)
B PERT DNA SPEC QL NAA+PROBE: NEGATIVE
BUN BLD-MCNC: 7 MG/DL (ref 8–20)
BUN/CREAT SERPL: 11.7 (ref 10–20)
C PNEUM DNA SPEC QL NAA+PROBE: NEGATIVE
CALCIUM BLD-MCNC: 7.8 MG/DL (ref 8.3–10.3)
CHLORIDE SERPL-SCNC: 116 MMOL/L (ref 101–111)
CO2 SERPL-SCNC: 24 MMOL/L (ref 22–32)
CORONAVIRUS 229E PCR:: NEGATIVE
CORONAVIRUS HKU1 PCR:: NEGATIVE
CORONAVIRUS NL63 PCR:: NEGATIVE
CORONAVIRUS OC43 PCR:: NEGATIVE
CREAT BLD-MCNC: 0.6 MG/DL (ref 0.55–1.02)
ERYTHROCYTE [DISTWIDTH] IN BLOOD BY AUTOMATED COUNT: 14 % (ref 11.5–16)
FLUAV RNA SPEC QL NAA+PROBE: NEGATIVE
FLUBV RNA SPEC QL NAA+PROBE: NEGATIVE
GLUCOSE BLD-MCNC: 95 MG/DL (ref 70–99)
HAV IGM SER QL: 1.9 MG/DL (ref 1.8–2.5)
HCT VFR BLD AUTO: 28.9 % (ref 34–50)
HGB BLD-MCNC: 9.4 G/DL (ref 12–16)
INR BLD: 1.7 (ref 0.9–1.1)
LACTIC ACID: 1.5 MMOL/L (ref 0.5–2)
MCH RBC QN AUTO: 25.7 PG (ref 27–33.2)
MCHC RBC AUTO-ENTMCNC: 32.5 G/DL (ref 31–37)
MCV RBC AUTO: 79 FL (ref 81–100)
METAPNEUMOVIRUS PCR:: POSITIVE
MYCOPLASMA PNEUMONIA PCR:: NEGATIVE
OSMOLALITY SERPL CALC.SUM OF ELEC: 298 MOSM/KG (ref 275–295)
PARAINFLUENZA 1 PCR:: NEGATIVE
PARAINFLUENZA 2 PCR:: NEGATIVE
PARAINFLUENZA 3 PCR:: NEGATIVE
PARAINFLUENZA 4 PCR:: NEGATIVE
PHOSPHATE SERPL-MCNC: 2.1 MG/DL (ref 2.5–4.9)
PLATELET # BLD AUTO: 182 10(3)UL (ref 150–450)
POTASSIUM SERPL-SCNC: 3.6 MMOL/L (ref 3.6–5.1)
POTASSIUM SERPL-SCNC: 4.3 MMOL/L (ref 3.6–5.1)
PROCALCITONIN SERPL-MCNC: <0.11 NG/ML
PSA SERPL DL<=0.01 NG/ML-MCNC: 20.6 SECONDS (ref 12.4–14.7)
RBC # BLD AUTO: 3.66 X10(6)UL (ref 3.8–5.1)
RED CELL DISTRIBUTION WIDTH-SD: 40.4 FL (ref 35.1–46.3)
RHINOVIRUS/ENTERO PCR:: NEGATIVE
RSV RNA SPEC QL NAA+PROBE: NEGATIVE
SODIUM SERPL-SCNC: 145 MMOL/L (ref 136–144)
WBC # BLD AUTO: 6.3 X10(3) UL (ref 4–13)

## 2019-01-24 PROCEDURE — 85610 PROTHROMBIN TIME: CPT | Performed by: NURSE PRACTITIONER

## 2019-01-24 PROCEDURE — 87081 CULTURE SCREEN ONLY: CPT | Performed by: NURSE PRACTITIONER

## 2019-01-24 PROCEDURE — 85027 COMPLETE CBC AUTOMATED: CPT | Performed by: NURSE PRACTITIONER

## 2019-01-24 PROCEDURE — 80048 BASIC METABOLIC PNL TOTAL CA: CPT | Performed by: NURSE PRACTITIONER

## 2019-01-24 PROCEDURE — 94664 DEMO&/EVAL PT USE INHALER: CPT

## 2019-01-24 PROCEDURE — 83605 ASSAY OF LACTIC ACID: CPT | Performed by: EMERGENCY MEDICINE

## 2019-01-24 PROCEDURE — 84100 ASSAY OF PHOSPHORUS: CPT | Performed by: NURSE PRACTITIONER

## 2019-01-24 PROCEDURE — 92610 EVALUATE SWALLOWING FUNCTION: CPT

## 2019-01-24 PROCEDURE — 84132 ASSAY OF SERUM POTASSIUM: CPT | Performed by: INTERNAL MEDICINE

## 2019-01-24 PROCEDURE — 83735 ASSAY OF MAGNESIUM: CPT | Performed by: NURSE PRACTITIONER

## 2019-01-24 PROCEDURE — 94640 AIRWAY INHALATION TREATMENT: CPT

## 2019-01-24 RX ORDER — BISACODYL 10 MG
10 SUPPOSITORY, RECTAL RECTAL
Status: DISCONTINUED | OUTPATIENT
Start: 2019-01-24 | End: 2019-01-26

## 2019-01-24 RX ORDER — SODIUM PHOSPHATE, DIBASIC AND SODIUM PHOSPHATE, MONOBASIC 7; 19 G/133ML; G/133ML
1 ENEMA RECTAL ONCE AS NEEDED
Status: DISCONTINUED | OUTPATIENT
Start: 2019-01-24 | End: 2019-01-26

## 2019-01-24 RX ORDER — POLYETHYLENE GLYCOL 3350 17 G/17G
17 POWDER, FOR SOLUTION ORAL DAILY PRN
Status: DISCONTINUED | OUTPATIENT
Start: 2019-01-24 | End: 2019-01-26

## 2019-01-24 RX ORDER — POTASSIUM CHLORIDE 20 MEQ/1
40 TABLET, EXTENDED RELEASE ORAL EVERY 4 HOURS
Status: COMPLETED | OUTPATIENT
Start: 2019-01-24 | End: 2019-01-24

## 2019-01-24 RX ORDER — HYDRALAZINE HYDROCHLORIDE 25 MG/1
25 TABLET, FILM COATED ORAL EVERY 8 HOURS SCHEDULED
Status: DISCONTINUED | OUTPATIENT
Start: 2019-01-24 | End: 2019-01-26

## 2019-01-24 RX ORDER — METOCLOPRAMIDE HYDROCHLORIDE 5 MG/ML
10 INJECTION INTRAMUSCULAR; INTRAVENOUS EVERY 8 HOURS PRN
Status: DISCONTINUED | OUTPATIENT
Start: 2019-01-24 | End: 2019-01-26

## 2019-01-24 RX ORDER — WARFARIN SODIUM 5 MG/1
5 TABLET ORAL
Status: COMPLETED | OUTPATIENT
Start: 2019-01-24 | End: 2019-01-24

## 2019-01-24 NOTE — PROGRESS NOTES
Pharmacy Dosing Service: Warfarin    Romy Roberson is a 79year old female for whom pharmacy has been dosing warfarin (Coumadin). The goal INR is 2-3.     Recent Labs   Lab  01/23/19   1615  01/24/19   0436   INR  1.58*  1.70*     Potential drug inter

## 2019-01-24 NOTE — PROGRESS NOTES
ICU  Critical Care APRN Progress Note    NAME: Doni Garcia - ROOM: 83132-K - MRN: [id number] - Age: 79year old - :1950    History Of Present Illness:  Doni Garcia is a 79year old female with multiple medical problems, most signif general anesthesia   • Arrhythmia    • Blind     legally blind   • Blood disorder     sickle cell trait   • Breast cancer ([facility] 1991    breast, right   • Calculus of kidney    • Cancer (HCC)    • Decreased vision    • Dysuria     chronic dysuria   • Esopha 1991 for R breast CA   • Mastectomy right  1991   • Mri breast biopsy w/ clip 1 site -set  2011   • Needle biopsy left      BN MRI GUIDED   • Other      urethra dilated   • Other Left 1980    Biopsy for scleroderma   • Other surgical history  12/ A comprehensive 10 point review of systems was completed. Pertinent positives and negatives noted in the HPI.     OBJECTIVE  Vitals:  /53   Pulse 97   Temp 100 °F (37.8 °C) (Temporal)   Resp 19   Ht 152.4 cm (5')   Wt 145 lb 8.1 oz (66 kg)   LMP  ( illness  -Wean O2 as able  -BIPAP if needed for WOB  -Rapid flu completed and negative  -Check RVP  -Continue IV hydration  -Continue Azithromycin and Aztreonam for now  -Refusing steroids due to past intolerance--may consider dependent on course  -Sputum

## 2019-01-24 NOTE — PROGRESS NOTES
[facility] Dosing Service  Warfarin (Coumadin) Initial Dosing      Travis Frias is a 79year old female for whom pharmacy has been consulted to dose warfarin (COUMADIN) for Hx of stroke  by Dr. Dominga Power.   Based on this indication, goal INR is

## 2019-01-24 NOTE — SLP NOTE
ADULT SWALLOWING EVALUATION    ASSESSMENT    ASSESSMENT/OVERALL IMPRESSION:  Pt seen this AM for bedside swallow evaluation. RN approved session. Pt admitted to hospital due to wheezing and fever.  Pt diagnosed with sepsis secondary to pneumonia and ARF sec sauce/gravy  Aspiration Precautions: Upright position; Slow rate;Small bites and sips  Medication Administration Recommendations: One pill at a time  Treatment Plan/Recommendations: Dysphagia therapy  Discharge Recommendations/Plan: Undetermined    HISTORY blind)    Patient/Family Goals: To eat and drink safely    SWALLOWING HISTORY  Current Diet Consistency: NPO  Dysphagia History: See above  Imaging Results: CXR 1/23/19: =====  CONCLUSION:  Minimal left basilar atelectasis or early pneumonia.        OBJECTI

## 2019-01-24 NOTE — CONSULTS
Critical Care H&P/Consult     NAME: Ricardo Barreto - ROOM: [facility]  MRN: [id number] - Age: 79year old - :  1950    Date of Admission: 2019  3:59 PM  Admission Diagnosis: Moderate persistent asthma with exacerbation [J45.41]  Sepsis due sided weakness   • Neuropathy     hands and feet   • Osteoarthritis     generalized   • Parkinson's disease    • Personal history of antineoplastic chemotherapy 1991   • Problems with swallowing    • Scleroderma ([facility] [address]    • Seizure disorder ([facility] [address] 2000's Biopsy for scleroderma   • OTHER SURGICAL HISTORY  12/9/10    R E[facility], Dr. Ac Yanes   • OTHER SURGICAL HISTORY Bilateral 6/4/12    BREAST BX @ [facility]   • OTHER SURGICAL HISTORY Left     L knee replacement Dr. Conn Needs    • OTHER SURGICAL HISTORY      urethra enlar AND VOMITING    Comment:NEEDS PREMEDICATION PRIOR TO PROCEDURES WITH             CONTRAST  Zofran [Ondansetron]    RASH  Aspirin                 RASH    Comment:Headache, rash, associated w/hepatitis diagnosis  Avelox [Moxifloxaci*        Comment:Pt.  Had r Oral Once   albuterol Sulfate (VENTOLIN) (5 MG/ML) 0.5% nebulizer solution 5 mg 5 mg Nebulization Continuous   acetaminophen (TYLENOL EXTRA STRENGTH) tab 500 mg 500 mg Oral Q6H PRN   famoTIDine (PEPCID) tab 40 mg 40 mg Oral Daily   folic acid (FOLVITE) tab Labs   Lab  01/23/19   1615  01/24/19   0436   RBC  4.42  3.66*   HGB  11.7*  9.4*   HCT  35.1  28.9*   MCV  79.4*  79.0*   MCH  26.5*  25.7*   MCHC  33.3  32.5   RDW  14.1  14.0   NEPRELIM  4.45   --    WBC  7.1  6.3   PLT  198.0  182.0     Recent Labs

## 2019-01-24 NOTE — PAYOR COMM NOTE
--------------  ADMISSION REVIEW     Payor: BCBS MEDICARE ADV PPO  Subscriber #:  [id number]  Authorization Number: [id number]    Admit date: 1/23/19  Admit time: 2108       Admitting Physician: Dulce Maria Yeh MD  Attending Physician:  Elliott Brown Leukocyte Esterase Urine Trace (*)     Mucous Urine 1+ (*)     All other components within normal limits   LACTIC ACID, PLASMA - Abnormal; Notable for the following components:    Lactic Acid 2.8 (*)     All other components within normal limits   PROTH exacerbation J45.41 1/23/2019 Unknown              [facility] Hospitalist History and Physical      CC: SOB      History of Present Illness: Patient is a 79year old female with PMH sig for cough, fever, and SOB.  Reports symptoms started on Friday and have become -    PULMONARY    Admission Diagnosis: Moderate persistent asthma with exacerbation [J45.41]  Sepsis due to pneumonia ([facility] [J18.9, A41.9]     Assessment/Plan:  1.  Viral bronchitis with bronchospasm  - RVP with metapneumovirus  - PCT negative  - f/u cultur ipratropium-albuterol (DUONEB) nebulizer solution 3 mL     Date Action Dose Route User    1/24/2019 0756 Given 3 mL Nebulization Yissel Fan RCP    1/24/2019 0310 Given 3 mL Nebulization Miladys Valentine    1/23/2019 2317 Given 3 mL Nebulization

## 2019-01-24 NOTE — PROGRESS NOTES
[facility] Hospitalist Progress Note                                                                   [address]  7/29/1950    CC: SEEMA SOB    Interval History:  - Looks much better today Objective:  Temp:  [97.7 °F (36.5 °C)-100.2 °F (37.9 °C)] 99.2 °F (37.3 °C)  Pulse:  [] 84  Resp:  [15-35] 19  BP: (107-155)/() 133/60    Exam:    Gen: No acute distress  Eyes: Pink conjunctivae, No ptosis, PERRL  Neck: No masses, trachae hospital records reviewed.    [facility] hospitalist to continue to follow patient while in house      Terry Harrington MD  [facility] Hospitalist  Pager: [phone]

## 2019-01-24 NOTE — PLAN OF CARE
Report received on patient from ER. Patient arrived to unit at 2115. Admission completed. Consults called. Patient A & Ox4. Hemodynamics stable. Patient has ABDULLAHI, with nonproductive cough. Remained on room air during the night. LA trended down.   CHG

## 2019-01-24 NOTE — H&P
[facility] Hospitalist History and Physical      CC: SOB    PCP: Darren Gomez MD    History of Present Illness: Patient is a 79year old female with PMH sig for cough, fever, and SOB. Reports symptoms started on Friday and have become progressively worse. Prior to Encounter:  Potassium Citrate ER 10 MEQ (1080 MG) Oral Tab CR Take 1 tablet (10 mEq total) by mouth 3 (three) times daily with meals. Disp: 90 tablet Rfl: 11   Warfarin Sodium 7.5 MG Oral Tab Take 7.5 mg by mouth See Admin Instructions.  Sundays, T Apply 2 drops to eye every 2 (two) hours as needed. To each eye every hour as needed  Disp:  Rfl:    Flaxseed, Linseed, (FLAXSEED OIL) 1000 MG Oral Cap Take 1,000 mg by mouth 2 (two) times daily.  Disp:  Rfl:    Wheat Dextrin (BENEFIBER OR) Take 15 mL by mo use  Denies illicts    Fam Hx  Family History   Problem Relation Age of Onset   • Cancer Father         lung cancer   • Heart Disorder Mother    • Hypertension Mother    • Breast Cancer Self 44   • Substance Abuse Self    • Migraines Self    • Gastro-Intes lupus and make her very ill  - Discussed that given current respiratory status may be necessary to try- she will consider  - Pulmonary consult  - RR in 40s despite continuous nebs, working hard to breath- admit to ICU - may need BIPAP- discussed with Dr. Yissel Romero

## 2019-01-25 LAB
INR BLD: 1.88 (ref 0.9–1.1)
PSA SERPL DL<=0.01 NG/ML-MCNC: 22.3 SECONDS (ref 12.4–14.7)

## 2019-01-25 PROCEDURE — 97161 PT EVAL LOW COMPLEX 20 MIN: CPT

## 2019-01-25 PROCEDURE — 85610 PROTHROMBIN TIME: CPT | Performed by: HOSPITALIST

## 2019-01-25 PROCEDURE — 94640 AIRWAY INHALATION TREATMENT: CPT

## 2019-01-25 PROCEDURE — 97530 THERAPEUTIC ACTIVITIES: CPT

## 2019-01-25 NOTE — PROGRESS NOTES
[facility] Hospitalist Progress Note                                                                   [address]  7/29/1950    CC: FU SOB    Interval History:  - Feeling better    Current rash  Vancomycin              RASH       Objective:  Temp:  [98 °F (36.7 °C)-99 °F (37.2 °C)] 98.5 °F (36.9 °C)  Pulse:  [] 119  Resp:  [16-25] 16  BP: (119-150)/(36-86) 126/67    Exam:    Gen: No acute distress  Eyes: Pink conjunctivae, No ptosis, P Improving     # SLE  - Continue cellcept     # Hx of stroke  - Continue coumadin, pharm to dose     # Anemia  - No sign of bleeding, suspet dilutional- CBC in AM    # HTN  - Continue BB, BP high restarted hydralazine      Dispo:  [facility] hospitalist service

## 2019-01-25 NOTE — CM/SW NOTE
Met with pt to discuss DC planning and PHQ4 of 5. Pt lives with her  and 27 y/o son. She is normally independent with ADLs, but does not drive due to visual impairments.   She has a history of [address] and has been to [facility] for rehab in the

## 2019-01-25 NOTE — HOME CARE LIAISON
Referral from [place], [facility] Herlinda Allen Met with patient to offer home health when discharged home. Patient agreeable to [facility] for RN/PT services. Brochure and contact information given to patient for any further questions/concerns.   Thanks for the referral.

## 2019-01-25 NOTE — CM/SW NOTE
[facility] ([phone]). She is available to post hospitalization needs.

## 2019-01-25 NOTE — PROGRESS NOTES
[address] [facility],[address] Patient Status:  Inpatient    1950 MRN [id number]   [facility] 5NW-A Attending Kelsy Herring MD   Hosp Day # 2 PCP Tamiko Matson MD     Pulm / Critical Care Progress Note     S: Pt st positive BS.    Extremity: no edema         Recent Labs   Lab  01/23/19   1316  01/23/19   1615  01/24/19   0436   WBC  6.18  7.1  6.3   HGB  11.5*  11.7*  9.4*   HCT  35.4  35.1  28.9*   PLT  219  198.0  182.0     Recent Labs   Lab  01/23/19   1615  01/24/

## 2019-01-25 NOTE — PROGRESS NOTES
Pt is a 80 y/o female admitted ith acute asthma exacerbation +metapneumovirus. alert oriented. Pt came from ICU at 1830. fall precaution. on room air. no acute distress noted. droplet precaution.

## 2019-01-25 NOTE — PAYOR COMM NOTE
--------------  CONTINUED STAY REVIEW    Payor: BCBS MEDICARE ADV PPO  Subscriber #:  [id number]  Authorization Number: [id number]    Admit date: 1/23/19  Admit time: 2108    Admitting Physician: Nesha Mcclendon MD  Attending Physician:  José Antonio Espinosa

## 2019-01-25 NOTE — PROGRESS NOTES
Pt is alert and oriented. RA, lungs wheezy and coarse, prod cough. Raysa,  Pt ref scds. Poor vision. rosey  Cath site is C/D/I. Gumaro GARNICA, PT rec home with HH. States she has general pain. will monitor

## 2019-01-25 NOTE — PHYSICAL THERAPY NOTE
PHYSICAL THERAPY EVALUATION - INPATIENT     Room Number: 841/897-G  Evaluation Date: 1/25/2019  Type of Evaluation: Initial  Physician Order: PT Eval and Treat    Presenting Problem: SOB/PNA  Reason for Therapy: Mobility Dysfunction and Discharge [place] deficiency        Past Surgical History  Past Surgical History:   Procedure Laterality Date   • ARTHROSCOPY OF JOINT UNLISTED Left     knee   • CHEMOTHERAPY  1991   • COLONOSCOPY N/A 8/2/2018    Performed by Ash Gomez MD at [facility] ENDOSCOPY   • COLON ulceration along the greater curvature with stigmata of recent bleeding, SB otherwise wnl   • TONSILLECTOMY     • UPPER GI ENDOSCOPY,DIAGNOSIS  8/11    Dr. Alise Varma, mild gastitis, bx + for H. pylori, duodenal bx negative for celiac disease   • UPPER GI END TOLERANCE                         O2 WALK                  AM-PAC '6-Clicks' INPATIENT SHORT FORM - BASIC MOBILITY  How much difficulty does the patient currently have. ..  -   Turning over in bed (including adjusting bedclothes, sheets and blankets)?: None pain.  Functional outcome measures completed include AMPAC. Based on this evaluation, patient's clinical presentation is stable and overall the evaluation complexity is considered low.   These impairments and comorbidities manifest themselves as functional

## 2019-01-26 VITALS
HEIGHT: 60 IN | SYSTOLIC BLOOD PRESSURE: 106 MMHG | TEMPERATURE: 98 F | RESPIRATION RATE: 18 BRPM | WEIGHT: 142 LBS | BODY MASS INDEX: 27.88 KG/M2 | OXYGEN SATURATION: 97 % | DIASTOLIC BLOOD PRESSURE: 64 MMHG | HEART RATE: 86 BPM

## 2019-01-26 LAB
BASOPHILS # BLD AUTO: 0.02 X10(3) UL (ref 0–0.1)
BASOPHILS NFR BLD AUTO: 0.3 %
EOSINOPHIL # BLD AUTO: 0.19 X10(3) UL (ref 0–0.3)
EOSINOPHIL NFR BLD AUTO: 3.3 %
ERYTHROCYTE [DISTWIDTH] IN BLOOD BY AUTOMATED COUNT: 14.1 % (ref 11.5–16)
HCT VFR BLD AUTO: 32.5 % (ref 34–50)
HGB BLD-MCNC: 10.3 G/DL (ref 12–16)
IMMATURE GRANULOCYTE COUNT: 0.02 X10(3) UL (ref 0–1)
IMMATURE GRANULOCYTE RATIO %: 0.3 %
INR BLD: 2 (ref 0.9–1.1)
LYMPHOCYTES # BLD AUTO: 1.93 X10(3) UL (ref 0.9–4)
LYMPHOCYTES NFR BLD AUTO: 33.2 %
MCH RBC QN AUTO: 25.5 PG (ref 27–33.2)
MCHC RBC AUTO-ENTMCNC: 31.7 G/DL (ref 31–37)
MCV RBC AUTO: 80.4 FL (ref 81–100)
MONOCYTES # BLD AUTO: 0.58 X10(3) UL (ref 0.1–1)
MONOCYTES NFR BLD AUTO: 10 %
NEUTROPHIL ABS PRELIM: 3.08 X10 (3) UL (ref 1.3–6.7)
NEUTROPHILS # BLD AUTO: 3.08 X10(3) UL (ref 1.3–6.7)
NEUTROPHILS NFR BLD AUTO: 52.9 %
PLATELET # BLD AUTO: 204 10(3)UL (ref 150–450)
PSA SERPL DL<=0.01 NG/ML-MCNC: 23.4 SECONDS (ref 12.4–14.7)
RBC # BLD AUTO: 4.04 X10(6)UL (ref 3.8–5.1)
RED CELL DISTRIBUTION WIDTH-SD: 41.1 FL (ref 35.1–46.3)
WBC # BLD AUTO: 5.8 X10(3) UL (ref 4–13)

## 2019-01-26 PROCEDURE — 94640 AIRWAY INHALATION TREATMENT: CPT

## 2019-01-26 PROCEDURE — 85025 COMPLETE CBC W/AUTO DIFF WBC: CPT | Performed by: HOSPITALIST

## 2019-01-26 PROCEDURE — 85610 PROTHROMBIN TIME: CPT | Performed by: HOSPITALIST

## 2019-01-26 RX ORDER — BENZONATATE 100 MG/1
100 CAPSULE ORAL 3 TIMES DAILY PRN
Qty: 30 CAPSULE | Refills: 1 | Status: SHIPPED | OUTPATIENT
Start: 2019-01-26 | End: 2019-03-15 | Stop reason: ALTCHOICE

## 2019-01-26 RX ORDER — ALBUTEROL SULFATE 90 UG/1
2 AEROSOL, METERED RESPIRATORY (INHALATION) EVERY 4 HOURS PRN
Qty: 1 INHALER | Refills: 0 | Status: SHIPPED | OUTPATIENT
Start: 2019-01-26 | End: 2019-02-06

## 2019-01-26 RX ORDER — WARFARIN SODIUM 5 MG/1
5 TABLET ORAL
Status: DISCONTINUED | OUTPATIENT
Start: 2019-01-26 | End: 2019-01-26

## 2019-01-26 NOTE — PLAN OF CARE
Impaired Functional Mobility    • Achieve highest/safest level of mobility/gait Progressing        Impaired Swallowing    • Minimize aspiration risk Progressing        Patient/Family Goals    • Patient/Family Long Term Goal Progressing    • Patient/Family

## 2019-01-26 NOTE — PROGRESS NOTES
NURSING DISCHARGE NOTE    Discharged Home via Wheelchair. Accompanied by Family member and Support staff  Belongings Taken by patient/family. Pt discharged home via wheelchair. Discharge instructions reviewed with pt and .  Port de accessed

## 2019-01-26 NOTE — DISCHARGE SUMMARY
General Medicine Discharge Summary     Patient ID:  Moreno Mckusialton  79year old  7/29/1950    Admit date: 1/23/2019    Discharge date and time: 1/26/2019    Attending Physician: Barb Funes MG Oral Tab  Take 7.5 mg by mouth See Admin Instructions. Sundays, Thursdays    famotidine 40 MG Oral Tab  Take 40 mg by mouth daily.    !! Warfarin Sodium 5 MG Oral Tab  Take 5 mg by mouth See Admin Instructions.  Mondays, Tuesdays, Wednesdays, Fridays,  Potential duplicate medications found. Please discuss with provider.           Activity: activity as tolerated  Diet: regular diet  Wound Care: as directed  Code Status: Full Code      Exam on day of discharge:      01/26/19  0945   BP: 120/69   Pulse: 76

## 2019-01-26 NOTE — PROGRESS NOTES
[facility] Dosing Service  Warfarin (Coumadin) Subsequent Dosing    Kailey Barreto is a 79year old female for whom pharmacy has been dosing warfarin (Coumadin).  Goal INR is 2-3    Recent Labs   Lab  01/23/19   1615  01/24/19   0436  01/25/19   [id number]

## 2019-01-27 NOTE — CM/SW NOTE
Patient discharged on 1/26/19 as previously planned.        01/27/19 1500   Discharge disposition   Expected discharge disposition Home-Health   Name of [facility] services after discharge Skilled home care   Discharge tr

## 2019-01-28 PROBLEM — D84.9 IMMUNOSUPPRESSED STATUS (HCC): Status: ACTIVE | Noted: 2019-01-28

## 2019-02-21 ENCOUNTER — HOSPITAL ENCOUNTER (OUTPATIENT)
Dept: LAB | Facility: HOSPITAL | Age: 69
Discharge: HOME | End: 2019-02-21
Attending: INTERNAL MEDICINE
Payer: MEDICARE

## 2019-02-21 DIAGNOSIS — D68.59 HYPERCOAGULABLE STATE (HCC): ICD-10-CM

## 2019-02-21 DIAGNOSIS — Z79.01 LONG TERM CURRENT USE OF ANTICOAGULANT THERAPY: ICD-10-CM

## 2019-02-21 LAB — POC INR: 2 (ref 0.8–1.3)

## 2019-02-21 PROCEDURE — 85610 PROTHROMBIN TIME: CPT | Performed by: INTERNAL MEDICINE

## 2019-03-15 PROBLEM — E87.6 HYPOKALEMIA: Status: RESOLVED | Noted: 2019-01-23 | Resolved: 2019-03-15

## 2019-03-15 PROBLEM — A41.9 SEPSIS DUE TO PNEUMONIA (HCC): Status: RESOLVED | Noted: 2019-01-23 | Resolved: 2019-03-15

## 2019-03-15 PROBLEM — M25.562 CHRONIC PAIN OF LEFT KNEE: Status: RESOLVED | Noted: 2018-03-31 | Resolved: 2019-03-15

## 2019-03-15 PROBLEM — J18.9 SEPSIS DUE TO PNEUMONIA (HCC): Status: RESOLVED | Noted: 2019-01-23 | Resolved: 2019-03-15

## 2019-03-15 PROBLEM — G89.29 CHRONIC PAIN OF LEFT KNEE: Status: RESOLVED | Noted: 2018-03-31 | Resolved: 2019-03-15

## 2019-03-15 PROBLEM — D64.9 ANEMIA: Status: RESOLVED | Noted: 2019-01-23 | Resolved: 2019-03-15

## 2019-03-25 PROCEDURE — 86160 COMPLEMENT ANTIGEN: CPT | Performed by: INTERNAL MEDICINE

## 2019-03-25 PROCEDURE — 84156 ASSAY OF PROTEIN URINE: CPT | Performed by: INTERNAL MEDICINE

## 2019-03-25 PROCEDURE — 81001 URINALYSIS AUTO W/SCOPE: CPT | Performed by: INTERNAL MEDICINE

## 2019-03-25 PROCEDURE — 86225 DNA ANTIBODY NATIVE: CPT | Performed by: INTERNAL MEDICINE

## 2019-03-25 PROCEDURE — 87086 URINE CULTURE/COLONY COUNT: CPT | Performed by: INTERNAL MEDICINE

## 2019-04-03 ENCOUNTER — HOSPITAL ENCOUNTER (OUTPATIENT)
Dept: LAB | Facility: HOSPITAL | Age: 69
Discharge: HOME | End: 2019-04-03
Attending: INTERNAL MEDICINE
Payer: MEDICARE

## 2019-04-03 DIAGNOSIS — D68.59 HYPERCOAGULABLE STATE (HCC): ICD-10-CM

## 2019-04-03 DIAGNOSIS — Z79.01 LONG TERM CURRENT USE OF ANTICOAGULANT THERAPY: ICD-10-CM

## 2019-04-03 PROCEDURE — 85610 PROTHROMBIN TIME: CPT | Performed by: INTERNAL MEDICINE

## 2019-04-19 ENCOUNTER — HOSPITAL ENCOUNTER (OUTPATIENT)
Dept: LAB | Facility: HOSPITAL | Age: 69
Discharge: HOME | End: 2019-04-19
Attending: INTERNAL MEDICINE
Payer: MEDICARE

## 2019-04-19 DIAGNOSIS — Z79.01 LONG TERM CURRENT USE OF ANTICOAGULANT THERAPY: ICD-10-CM

## 2019-04-19 DIAGNOSIS — D68.59 HYPERCOAGULABLE STATE (HCC): ICD-10-CM

## 2019-04-19 PROCEDURE — 85610 PROTHROMBIN TIME: CPT | Performed by: INTERNAL MEDICINE

## 2019-05-03 ENCOUNTER — HOSPITAL ENCOUNTER (OUTPATIENT)
Dept: LAB | Facility: HOSPITAL | Age: 69
Discharge: HOME | End: 2019-05-03
Attending: INTERNAL MEDICINE
Payer: MEDICARE

## 2019-05-03 DIAGNOSIS — D68.59 HYPERCOAGULABLE STATE (HCC): ICD-10-CM

## 2019-05-03 PROCEDURE — 85610 PROTHROMBIN TIME: CPT | Performed by: INTERNAL MEDICINE

## 2019-05-22 PROCEDURE — 81001 URINALYSIS AUTO W/SCOPE: CPT | Performed by: INTERNAL MEDICINE

## 2019-05-22 PROCEDURE — 84156 ASSAY OF PROTEIN URINE: CPT | Performed by: INTERNAL MEDICINE

## 2019-05-22 PROCEDURE — 82570 ASSAY OF URINE CREATININE: CPT | Performed by: INTERNAL MEDICINE

## 2019-05-24 ENCOUNTER — HOSPITAL ENCOUNTER (OUTPATIENT)
Dept: MAMMOGRAPHY | Facility: HOSPITAL | Age: 69
Discharge: HOME | End: 2019-05-24
Attending: SURGERY
Payer: MEDICARE

## 2019-05-24 DIAGNOSIS — Z90.11 S/P RIGHT MASTECTOMY: ICD-10-CM

## 2019-05-24 PROCEDURE — 76642 ULTRASOUND BREAST LIMITED: CPT | Performed by: SURGERY

## 2019-05-24 PROCEDURE — 77065 DX MAMMO INCL CAD UNI: CPT | Performed by: SURGERY

## 2019-05-24 PROCEDURE — 77061 BREAST TOMOSYNTHESIS UNI: CPT | Performed by: SURGERY

## 2019-05-29 ENCOUNTER — PRIOR ORIGINAL RECORDS (OUTPATIENT)
Dept: OTHER | Age: 69
End: 2019-05-29

## 2019-06-03 ENCOUNTER — HOSPITAL ENCOUNTER (OUTPATIENT)
Dept: LAB | Facility: HOSPITAL | Age: 69
Discharge: HOME | End: 2019-06-03
Attending: INTERNAL MEDICINE
Payer: MEDICARE

## 2019-06-03 DIAGNOSIS — D68.59 HYPERCOAGULABLE STATE (HCC): ICD-10-CM

## 2019-06-03 PROCEDURE — 83883 ASSAY NEPHELOMETRY NOT SPEC: CPT | Performed by: INTERNAL MEDICINE

## 2019-06-03 PROCEDURE — 84165 PROTEIN E-PHORESIS SERUM: CPT | Performed by: INTERNAL MEDICINE

## 2019-06-03 PROCEDURE — 86038 ANTINUCLEAR ANTIBODIES: CPT | Performed by: INTERNAL MEDICINE

## 2019-06-03 PROCEDURE — 86225 DNA ANTIBODY NATIVE: CPT | Performed by: INTERNAL MEDICINE

## 2019-06-03 PROCEDURE — 86235 NUCLEAR ANTIGEN ANTIBODY: CPT | Performed by: INTERNAL MEDICINE

## 2019-06-03 PROCEDURE — 87389 HIV-1 AG W/HIV-1&-2 AB AG IA: CPT | Performed by: INTERNAL MEDICINE

## 2019-06-03 PROCEDURE — 85610 PROTHROMBIN TIME: CPT | Performed by: INTERNAL MEDICINE

## 2019-06-03 PROCEDURE — 86160 COMPLEMENT ANTIGEN: CPT | Performed by: INTERNAL MEDICINE

## 2019-06-03 PROCEDURE — 86803 HEPATITIS C AB TEST: CPT | Performed by: INTERNAL MEDICINE

## 2019-06-03 PROCEDURE — 86334 IMMUNOFIX E-PHORESIS SERUM: CPT | Performed by: INTERNAL MEDICINE

## 2019-06-03 PROCEDURE — 83516 IMMUNOASSAY NONANTIBODY: CPT | Performed by: INTERNAL MEDICINE

## 2019-07-03 ENCOUNTER — HOSPITAL ENCOUNTER (OUTPATIENT)
Dept: LAB | Facility: HOSPITAL | Age: 69
Discharge: HOME | End: 2019-07-03
Attending: INTERNAL MEDICINE
Payer: MEDICARE

## 2019-07-03 DIAGNOSIS — D68.59 HYPERCOAGULABLE STATE (HCC): ICD-10-CM

## 2019-07-03 LAB — POC INR: 5 (ref 0.8–1.3)

## 2019-07-03 PROCEDURE — 85610 PROTHROMBIN TIME: CPT | Performed by: INTERNAL MEDICINE

## 2019-07-05 ENCOUNTER — HOSPITAL ENCOUNTER (OUTPATIENT)
Dept: LAB | Facility: HOSPITAL | Age: 69
Discharge: HOME | End: 2019-07-05
Attending: INTERNAL MEDICINE
Payer: MEDICARE

## 2019-07-05 DIAGNOSIS — D68.59 HYPERCOAGULABLE STATE (HCC): ICD-10-CM

## 2019-07-05 LAB — POC INR: 1.7 (ref 0.8–1.3)

## 2019-07-05 PROCEDURE — 85610 PROTHROMBIN TIME: CPT | Performed by: INTERNAL MEDICINE

## 2019-07-11 PROCEDURE — 86256 FLUORESCENT ANTIBODY TITER: CPT | Performed by: INTERNAL MEDICINE

## 2019-07-11 PROCEDURE — 86160 COMPLEMENT ANTIGEN: CPT | Performed by: INTERNAL MEDICINE

## 2019-07-11 PROCEDURE — 84156 ASSAY OF PROTEIN URINE: CPT | Performed by: INTERNAL MEDICINE

## 2019-07-11 PROCEDURE — 81001 URINALYSIS AUTO W/SCOPE: CPT | Performed by: INTERNAL MEDICINE

## 2019-07-19 ENCOUNTER — HOSPITAL ENCOUNTER (OUTPATIENT)
Dept: LAB | Facility: HOSPITAL | Age: 69
Discharge: HOME | End: 2019-07-19
Attending: INTERNAL MEDICINE
Payer: MEDICARE

## 2019-07-19 DIAGNOSIS — D68.59 HYPERCOAGULABLE STATE (HCC): ICD-10-CM

## 2019-07-19 LAB — POC INR: 2 (ref 0.8–1.3)

## 2019-07-19 PROCEDURE — 85610 PROTHROMBIN TIME: CPT | Performed by: INTERNAL MEDICINE

## 2019-08-09 ENCOUNTER — HOSPITAL ENCOUNTER (OUTPATIENT)
Dept: LAB | Facility: HOSPITAL | Age: 69
Discharge: HOME | End: 2019-08-09
Attending: INTERNAL MEDICINE
Payer: MEDICARE

## 2019-08-09 DIAGNOSIS — D68.59 HYPERCOAGULABLE STATE (HCC): ICD-10-CM

## 2019-08-09 LAB — POC INR: 3.1 (ref 0.8–1.3)

## 2019-08-09 PROCEDURE — 85610 PROTHROMBIN TIME: CPT | Performed by: INTERNAL MEDICINE

## 2019-08-12 PROCEDURE — 89055 LEUKOCYTE ASSESSMENT FECAL: CPT | Performed by: INTERNAL MEDICINE

## 2019-08-12 PROCEDURE — 87329 GIARDIA AG IA: CPT | Performed by: INTERNAL MEDICINE

## 2019-08-12 PROCEDURE — 87272 CRYPTOSPORIDIUM AG IF: CPT | Performed by: INTERNAL MEDICINE

## 2019-08-12 PROCEDURE — 87046 STOOL CULTR AEROBIC BACT EA: CPT | Performed by: INTERNAL MEDICINE

## 2019-08-12 PROCEDURE — 87045 FECES CULTURE AEROBIC BACT: CPT | Performed by: INTERNAL MEDICINE

## 2019-08-19 PROCEDURE — 82570 ASSAY OF URINE CREATININE: CPT | Performed by: INTERNAL MEDICINE

## 2019-08-19 PROCEDURE — 84156 ASSAY OF PROTEIN URINE: CPT | Performed by: INTERNAL MEDICINE

## 2019-08-19 PROCEDURE — 81003 URINALYSIS AUTO W/O SCOPE: CPT | Performed by: INTERNAL MEDICINE

## 2019-08-23 ENCOUNTER — HOSPITAL ENCOUNTER (OUTPATIENT)
Dept: LAB | Facility: HOSPITAL | Age: 69
Discharge: HOME | End: 2019-08-23
Attending: INTERNAL MEDICINE
Payer: MEDICARE

## 2019-08-23 DIAGNOSIS — D68.59 HYPERCOAGULABLE STATE (HCC): ICD-10-CM

## 2019-08-23 LAB — POC INR: 2.4 (ref 0.8–1.3)

## 2019-08-23 PROCEDURE — 85610 PROTHROMBIN TIME: CPT | Performed by: INTERNAL MEDICINE

## 2019-09-13 ENCOUNTER — HOSPITAL ENCOUNTER (OUTPATIENT)
Dept: LAB | Facility: HOSPITAL | Age: 69
Discharge: HOME | End: 2019-09-13
Attending: INTERNAL MEDICINE
Payer: MEDICARE

## 2019-09-13 DIAGNOSIS — D68.59 HYPERCOAGULABLE STATE (HCC): ICD-10-CM

## 2019-09-13 LAB — POC INR: 2.2 (ref 0.8–1.3)

## 2019-09-13 PROCEDURE — 85610 PROTHROMBIN TIME: CPT | Performed by: INTERNAL MEDICINE

## 2019-10-11 ENCOUNTER — HOSPITAL ENCOUNTER (OUTPATIENT)
Dept: LAB | Facility: HOSPITAL | Age: 69
Discharge: HOME | End: 2019-10-11
Attending: INTERNAL MEDICINE
Payer: MEDICARE

## 2019-10-11 DIAGNOSIS — D68.59 HYPERCOAGULABLE STATE (HCC): ICD-10-CM

## 2019-10-11 PROCEDURE — 85610 PROTHROMBIN TIME: CPT | Performed by: INTERNAL MEDICINE

## 2019-10-22 NOTE — PLAN OF CARE
Eye Block Checklist  Preanesthetic Checklist:  Patient Identified, Risks and Benefits Discussed, Monitors and Equipment Checked, Patient Hemodynamically Stable, Timeout Performed and Site Marked  Patient Position:  Supine  Monitors Checked:  EKG, NIBP and Pulse Oximetry  Oxygen Supplement:  Face Mask  Skin Prep:  Alcohol    Eye Block Details  Eye Block Type:  Retrobulbar  Laterality:  Left  Local Anesthetic:  1:1 2% Lidocaine with 1:200,000 epinephrine and 0.75% bupivicaine with hyaluronidase  Local Anesthetic Volume:  10 mL  LID Block:  No  Quality of Block:  Excellent  Complications:  None    Eye Block Note         Problem: MUSCULOSKELETAL - ADULT  Goal: Return mobility to safest level of function  INTERVENTIONS:  - Assess patient stability and activity tolerance for standing, transferring and ambulating w/ or w/o assistive devices  - Assist with transfers and ambula

## 2019-10-24 ENCOUNTER — HOSPITAL ENCOUNTER (OUTPATIENT)
Dept: LAB | Facility: HOSPITAL | Age: 69
Discharge: HOME | End: 2019-10-24
Attending: INTERNAL MEDICINE
Payer: MEDICARE

## 2019-10-24 DIAGNOSIS — D68.59 HYPERCOAGULABLE STATE (HCC): ICD-10-CM

## 2019-10-24 PROCEDURE — 85610 PROTHROMBIN TIME: CPT | Performed by: INTERNAL MEDICINE

## 2019-11-06 ENCOUNTER — PRIOR ORIGINAL RECORDS (OUTPATIENT)
Dept: OTHER | Age: 69
End: 2019-11-06

## 2019-11-21 ENCOUNTER — HOSPITAL ENCOUNTER (OUTPATIENT)
Dept: LAB | Facility: HOSPITAL | Age: 69
Discharge: HOME | End: 2019-11-21
Attending: INTERNAL MEDICINE
Payer: MEDICARE

## 2019-11-21 DIAGNOSIS — D68.59 HYPERCOAGULABLE STATE (HCC): ICD-10-CM

## 2019-11-21 PROCEDURE — 85610 PROTHROMBIN TIME: CPT | Performed by: INTERNAL MEDICINE

## 2019-12-09 ENCOUNTER — APPOINTMENT (OUTPATIENT)
Dept: GENERAL RADIOLOGY | Facility: HOSPITAL | Age: 69
End: 2019-12-09
Attending: EMERGENCY MEDICINE
Payer: MEDICARE

## 2019-12-09 ENCOUNTER — HOSPITAL ENCOUNTER (OUTPATIENT)
Facility: HOSPITAL | Age: 69
Setting detail: OBSERVATION
Discharge: HOME | End: 2019-12-10
Attending: EMERGENCY MEDICINE | Admitting: INTERNAL MEDICINE
Payer: MEDICARE

## 2019-12-09 DIAGNOSIS — R07.9 CHEST PAIN OF UNCERTAIN ETIOLOGY: Primary | ICD-10-CM

## 2019-12-09 PROBLEM — D64.9 ANEMIA: Status: ACTIVE | Noted: 2019-12-09

## 2019-12-09 PROBLEM — R73.9 HYPERGLYCEMIA: Status: ACTIVE | Noted: 2019-12-09

## 2019-12-09 PROCEDURE — 93010 ELECTROCARDIOGRAM REPORT: CPT

## 2019-12-09 PROCEDURE — 85610 PROTHROMBIN TIME: CPT | Performed by: EMERGENCY MEDICINE

## 2019-12-09 PROCEDURE — 93005 ELECTROCARDIOGRAM TRACING: CPT

## 2019-12-09 PROCEDURE — 99285 EMERGENCY DEPT VISIT HI MDM: CPT

## 2019-12-09 PROCEDURE — 71045 X-RAY EXAM CHEST 1 VIEW: CPT | Performed by: EMERGENCY MEDICINE

## 2019-12-09 PROCEDURE — 80053 COMPREHEN METABOLIC PANEL: CPT | Performed by: EMERGENCY MEDICINE

## 2019-12-09 PROCEDURE — 36415 COLL VENOUS BLD VENIPUNCTURE: CPT

## 2019-12-09 PROCEDURE — 85379 FIBRIN DEGRADATION QUANT: CPT | Performed by: EMERGENCY MEDICINE

## 2019-12-09 PROCEDURE — 85025 COMPLETE CBC W/AUTO DIFF WBC: CPT | Performed by: EMERGENCY MEDICINE

## 2019-12-09 PROCEDURE — 84484 ASSAY OF TROPONIN QUANT: CPT | Performed by: EMERGENCY MEDICINE

## 2019-12-09 PROCEDURE — 96372 THER/PROPH/DIAG INJ SC/IM: CPT

## 2019-12-09 RX ORDER — HYDROCODONE BITARTRATE AND ACETAMINOPHEN 5; 325 MG/1; MG/1
1 TABLET ORAL ONCE
Status: COMPLETED | OUTPATIENT
Start: 2019-12-09 | End: 2019-12-09

## 2019-12-09 RX ORDER — ENOXAPARIN SODIUM 100 MG/ML
60 INJECTION SUBCUTANEOUS ONCE
Status: COMPLETED | OUTPATIENT
Start: 2019-12-09 | End: 2019-12-09

## 2019-12-10 ENCOUNTER — APPOINTMENT (OUTPATIENT)
Dept: CV DIAGNOSTICS | Facility: HOSPITAL | Age: 69
End: 2019-12-10
Attending: HOSPITALIST
Payer: MEDICARE

## 2019-12-10 VITALS
DIASTOLIC BLOOD PRESSURE: 52 MMHG | TEMPERATURE: 98 F | WEIGHT: 145 LBS | HEART RATE: 53 BPM | RESPIRATION RATE: 18 BRPM | SYSTOLIC BLOOD PRESSURE: 142 MMHG | OXYGEN SATURATION: 96 % | HEIGHT: 60 IN | BODY MASS INDEX: 28.47 KG/M2

## 2019-12-10 PROCEDURE — 84484 ASSAY OF TROPONIN QUANT: CPT | Performed by: HOSPITALIST

## 2019-12-10 PROCEDURE — 93017 CV STRESS TEST TRACING ONLY: CPT | Performed by: HOSPITALIST

## 2019-12-10 PROCEDURE — 80048 BASIC METABOLIC PNL TOTAL CA: CPT | Performed by: HOSPITALIST

## 2019-12-10 PROCEDURE — 93005 ELECTROCARDIOGRAM TRACING: CPT

## 2019-12-10 PROCEDURE — 4A02XM4 MEASUREMENT OF CARDIAC TOTAL ACTIVITY, EXTERNAL APPROACH: ICD-10-PCS | Performed by: RADIOLOGY

## 2019-12-10 PROCEDURE — 85025 COMPLETE CBC W/AUTO DIFF WBC: CPT | Performed by: HOSPITALIST

## 2019-12-10 PROCEDURE — 3E033HZ INTRODUCTION OF RADIOACTIVE SUBSTANCE INTO PERIPHERAL VEIN, PERCUTANEOUS APPROACH: ICD-10-PCS | Performed by: RADIOLOGY

## 2019-12-10 PROCEDURE — 93018 CV STRESS TEST I&R ONLY: CPT | Performed by: HOSPITALIST

## 2019-12-10 PROCEDURE — 78452 HT MUSCLE IMAGE SPECT MULT: CPT | Performed by: HOSPITALIST

## 2019-12-10 PROCEDURE — 85610 PROTHROMBIN TIME: CPT | Performed by: INTERNAL MEDICINE

## 2019-12-10 PROCEDURE — 93010 ELECTROCARDIOGRAM REPORT: CPT | Performed by: INTERNAL MEDICINE

## 2019-12-10 RX ORDER — POLYETHYLENE GLYCOL 3350 17 G/17G
17 POWDER, FOR SOLUTION ORAL DAILY PRN
COMMUNITY

## 2019-12-10 RX ORDER — MELATONIN
325 2 TIMES DAILY WITH MEALS
Status: DISCONTINUED | OUTPATIENT
Start: 2019-12-10 | End: 2019-12-10

## 2019-12-10 RX ORDER — HYDRALAZINE HYDROCHLORIDE 25 MG/1
25 TABLET, FILM COATED ORAL 3 TIMES DAILY
Status: DISCONTINUED | OUTPATIENT
Start: 2019-12-10 | End: 2019-12-10

## 2019-12-10 RX ORDER — LIDOCAINE 50 MG/G
1 PATCH TOPICAL
COMMUNITY

## 2019-12-10 RX ORDER — LOSARTAN POTASSIUM 25 MG/1
50 TABLET ORAL 2 TIMES DAILY
Status: DISCONTINUED | OUTPATIENT
Start: 2019-12-10 | End: 2019-12-10

## 2019-12-10 RX ORDER — FAMOTIDINE 20 MG/1
40 TABLET ORAL DAILY
Status: DISCONTINUED | OUTPATIENT
Start: 2019-12-10 | End: 2019-12-10

## 2019-12-10 RX ORDER — ACETAMINOPHEN 325 MG/1
650 TABLET ORAL EVERY 6 HOURS PRN
Status: DISCONTINUED | OUTPATIENT
Start: 2019-12-10 | End: 2019-12-10

## 2019-12-10 RX ORDER — AMMONIUM LACTATE 12 G/100G
1 LOTION TOPICAL NIGHTLY
Status: DISCONTINUED | OUTPATIENT
Start: 2019-12-10 | End: 2019-12-10

## 2019-12-10 RX ORDER — MORPHINE SULFATE 4 MG/ML
4 INJECTION, SOLUTION INTRAMUSCULAR; INTRAVENOUS EVERY 30 MIN PRN
Status: ACTIVE | OUTPATIENT
Start: 2019-12-10 | End: 2019-12-10

## 2019-12-10 RX ORDER — WARFARIN SODIUM 5 MG/1
5 TABLET ORAL ONCE
Status: COMPLETED | OUTPATIENT
Start: 2019-12-10 | End: 2019-12-10

## 2019-12-10 RX ORDER — MULTIPLE VITAMINS W/ MINERALS TAB 9MG-400MCG
1 TAB ORAL DAILY
Status: DISCONTINUED | OUTPATIENT
Start: 2019-12-10 | End: 2019-12-10

## 2019-12-10 RX ORDER — ACETAMINOPHEN 500 MG
500 TABLET ORAL EVERY 6 HOURS PRN
Status: DISCONTINUED | OUTPATIENT
Start: 2019-12-10 | End: 2019-12-10

## 2019-12-10 RX ORDER — METOCLOPRAMIDE HYDROCHLORIDE 5 MG/ML
10 INJECTION INTRAMUSCULAR; INTRAVENOUS EVERY 8 HOURS PRN
Status: DISCONTINUED | OUTPATIENT
Start: 2019-12-10 | End: 2019-12-10

## 2019-12-10 RX ORDER — OXYBUTYNIN CHLORIDE 10 MG/1
10 TABLET, EXTENDED RELEASE ORAL DAILY
Status: DISCONTINUED | OUTPATIENT
Start: 2019-12-10 | End: 2019-12-10

## 2019-12-10 RX ORDER — DOCUSATE SODIUM 100 MG/1
100 CAPSULE, LIQUID FILLED ORAL 2 TIMES DAILY
Status: DISCONTINUED | OUTPATIENT
Start: 2019-12-10 | End: 2019-12-10

## 2019-12-10 RX ORDER — HYDROCODONE BITARTRATE AND ACETAMINOPHEN 5; 325 MG/1; MG/1
1 TABLET ORAL EVERY 8 HOURS PRN
Qty: 10 TABLET | Refills: 0 | Status: SHIPPED | OUTPATIENT
Start: 2019-12-10 | End: 2021-11-10

## 2019-12-10 RX ORDER — POLYETHYLENE GLYCOL 3350 17 G/17G
17 POWDER, FOR SOLUTION ORAL DAILY PRN
Status: DISCONTINUED | OUTPATIENT
Start: 2019-12-10 | End: 2019-12-10

## 2019-12-10 RX ORDER — ENOXAPARIN SODIUM 100 MG/ML
1 INJECTION SUBCUTANEOUS EVERY 12 HOURS SCHEDULED
Status: DISCONTINUED | OUTPATIENT
Start: 2019-12-10 | End: 2019-12-10

## 2019-12-10 RX ORDER — WARFARIN SODIUM 5 MG/1
5 TABLET ORAL
Status: DISCONTINUED | OUTPATIENT
Start: 2019-12-10 | End: 2019-12-10

## 2019-12-10 RX ORDER — MYCOPHENOLATE MOFETIL 250 MG/1
1500 CAPSULE ORAL 2 TIMES DAILY
Status: DISCONTINUED | OUTPATIENT
Start: 2019-12-10 | End: 2019-12-10

## 2019-12-10 RX ORDER — HYDROCODONE BITARTRATE AND ACETAMINOPHEN 5; 325 MG/1; MG/1
1 TABLET ORAL EVERY 6 HOURS PRN
Status: DISCONTINUED | OUTPATIENT
Start: 2019-12-10 | End: 2019-12-10

## 2019-12-10 NOTE — CONSULTS
[facility] Cardiology Consultation Timbo Brooks MD    The patient was interviewed, examined, the chart was reviewed and the consult was dictated. This is a 81year old female with a chief complaint of chest discomfort. Impression:  1.   Atypical chest disco

## 2019-12-10 NOTE — CONSULTS
[facility]    PATIENT'S NAME: Magaly Girardan   ATTENDING PHYSICIAN: Marcy Rubio M.D.   CONSULTING PHYSICIAN: Julia Begum M.D.    PATIENT ACCOUNT#:   [de-identified]    LOCATION:  [address] [place] [address]  MEDICAL RECORD #:   [id number]       DATE OF distress. VITAL SIGNS:  Blood pressure 130/80, pulse 80 and regular. HEENT:  Normocephalic. Anicteric sclerae. She is wearing dark glasses. NECK:  Supple. LUNGS:  Clear to auscultation bilaterally.   HEART:  There is no jugular venous distention, fletcher

## 2019-12-10 NOTE — PROGRESS NOTES
CARDIODIAGNOSTIC PRELIMINARY REPORT:      LEXISCAN injection completed with no new EKG changes noted; no arrhythmias     Base:  158/84, HR: 58;  Peak: 168/84, HR: 82   Prior to stress portion of test she stated she has mid chest discomfort/mild pressure al

## 2019-12-10 NOTE — PROGRESS NOTES
[facility] Dosing Service  Warfarin (Coumadin) Initial Dosing    Adin Orellana is a 81year old female for whom pharmacy has been consulted to dose warfarin (COUMADIN) for hypercoagulable state by Dr. Nunu Owen.   Based on this indication, goal INR is 2

## 2019-12-10 NOTE — PLAN OF CARE
NURSING ADMISSION NOTE      Patient admitted via cart. Oriented to room. Safety precautions initiated. Bed in low position. Call light in reach. Monitor on tele-SB,  on RA. MD notified of home meds/chest pain. PRN tylenol ordered.  Home meds b

## 2019-12-10 NOTE — H&P
[facility] hospitalist H+P  PCP Soledad Tovar MD  CC chest pain  HPI 82 yo female with multiple medical problems including but not limited to blindness, sickle cell trait, depression, GERD, fibromyalgia, SLE, HTN, presented with c/o chest pain.  Started arou disease ([facility]    • Stroke ([facility]     \"stroke-like symptoms\"-no residual effects; no diagnostic findings several years ago   • Systemic lupus ([facility]     SLE   • Visual impairment     glasses   • Vitamin D deficiency      Family History   Problem Relation Ag Physically abused: Not on file        Forced sexual activity: Not on file    Other Topics      Concerns:         Service: No        Blood Transfusions: Not Asked        Caffeine Concern: Not Asked        Occupational Exposure: Not Asked        Hobb lidocaine 5 % External Patch, Place 1 patch onto the skin daily as needed. , Disp: , Rfl:   Oxybutynin Chloride ER 10 MG Oral Tablet 24 Hr, Take 1 tablet (10 mg total) by mouth daily. , Disp: 30 tablet, Rfl: 0  losartan 50 MG Oral Tab, Take 1 tablet (50 mg Tuesdays, Wednesdays, thurday, Saturdays , sunday ), Disp: 90 tablet, Rfl: 3  Warfarin Sodium 1 MG Oral Tab, Take one po daily (Patient taking differently: Take 2.5 mg by mouth Every Friday.  Take one po daily ), Disp: 90 tablet, Rfl: 3  acetaminophen 500 M ordered  Follow up with rheumatology    HTN med ordered    Preventatiove Coumadin, martineznox    Enma Hebert MD  [facility] hospitalist  [phone]    Addendum norco helped with pain control. Possible musculoskeletal componnet of pain.  No NSAIDs due to pt being o

## 2019-12-10 NOTE — PLAN OF CARE
Preliminary Lexiscan results as called to me by Dr. Levie Dancer: LVEF 74% and negative for perfusion abnormalities.

## 2019-12-10 NOTE — PROGRESS NOTES
Pharmacy Dosing Service: Warfarin (Coumadin)    Lang Taylor is an 81year old female with a history of hypercoagulable state (on outpatient coumadin). Pharmacy was consulted to dose warfarin (COUMADIN) by Dr. Claudell Schimke on 12/9/19.   Based on this indica

## 2019-12-10 NOTE — PLAN OF CARE
Received patient today alert and oriented x3 and having the same chest pain that she came in with. Patient went for stress test today. She was then cleared by cardiology for discharge after a negative result.  Patient stated her pain is the same but she wou

## 2019-12-10 NOTE — ED PROVIDER NOTES
Patient Seen in: [facility] Emergency Department      History   Patient presents with:  Chest Pain Angina    Stated Complaint: chest pain    HPI  This is a 73-year-old female who arrives here with complaints of chest pain that started about 1 PM.  S residual effects; no diagnostic findings several years ago   • Systemic lupus ([facility]     SLE   • Visual impairment     glasses   • Vitamin D deficiency               Past Surgical History:   Procedure Laterality Date   • ARTHROSCOPY OF JOINT UNLISTED Left biopsy   • PORT, INDWELLING, IMP      power port left upper chest   • SM INTESTINAL IMAGE CAPSULE  12/6/11    Large superficial ulceration along the greater curvature with stigmata of recent bleeding, SB otherwise wnl   • TONSILLECTOMY     • UPPER GI ENDOS No crackles. The patient has chest wall tenderness on palpation is easily reproducible on her chest wall. CV: Cardiovascular is regular without murmurs or rubs. ABD: The abdomen is soft nondistended nontender. There is no rebound.   There is no guardi RAINBOW DRAW LAVENDER   RAINBOW DRAW LIGHT GREEN   RAINBOW DRAW GOLD           The patient was placed on monitors, IV was started, blood was drawn. The patient is allergic to aspirin. MDM     The EKG shows normal sinus rhythm.   There is no acute ST e dobutamine stress test as she states this has a hard time walking.     Disposition and Plan     Clinical Impression:  Chest pain of uncertain etiology  (primary encounter diagnosis)    Disposition:  Admit  12/9/2019 11:24 pm    Follow-up:  No follow-up prov

## 2019-12-12 ENCOUNTER — HOSPITAL ENCOUNTER (OUTPATIENT)
Dept: LAB | Facility: HOSPITAL | Age: 69
Discharge: HOME | End: 2019-12-12
Attending: INTERNAL MEDICINE
Payer: MEDICARE

## 2019-12-12 DIAGNOSIS — Z79.01 LONG TERM CURRENT USE OF ANTICOAGULANT THERAPY: ICD-10-CM

## 2019-12-12 DIAGNOSIS — D68.59 HYPERCOAGULABLE STATE (HCC): ICD-10-CM

## 2019-12-12 DIAGNOSIS — Z51.81 ENCOUNTER FOR THERAPEUTIC DRUG MONITORING: ICD-10-CM

## 2019-12-12 PROCEDURE — 85610 PROTHROMBIN TIME: CPT | Performed by: INTERNAL MEDICINE

## 2019-12-17 PROBLEM — M17.12 OSTEOARTHRITIS OF LEFT KNEE, UNSPECIFIED OSTEOARTHRITIS TYPE: Status: RESOLVED | Noted: 2017-11-09 | Resolved: 2019-12-17

## 2019-12-17 PROBLEM — R73.9 HYPERGLYCEMIA: Status: RESOLVED | Noted: 2019-12-09 | Resolved: 2019-12-17

## 2019-12-18 ENCOUNTER — HOSPITAL ENCOUNTER (OUTPATIENT)
Dept: LAB | Facility: HOSPITAL | Age: 69
Discharge: HOME | End: 2019-12-18
Attending: INTERNAL MEDICINE
Payer: MEDICARE

## 2019-12-18 DIAGNOSIS — Z51.81 ENCOUNTER FOR THERAPEUTIC DRUG MONITORING: ICD-10-CM

## 2019-12-18 DIAGNOSIS — D68.59 HYPERCOAGULABLE STATE (HCC): ICD-10-CM

## 2019-12-18 DIAGNOSIS — Z79.01 LONG TERM CURRENT USE OF ANTICOAGULANT THERAPY: ICD-10-CM

## 2019-12-18 PROCEDURE — 85610 PROTHROMBIN TIME: CPT | Performed by: INTERNAL MEDICINE

## 2019-12-31 ENCOUNTER — PRIOR ORIGINAL RECORDS (OUTPATIENT)
Dept: OTHER | Age: 69
End: 2019-12-31

## 2020-01-15 ENCOUNTER — HOSPITAL ENCOUNTER (OUTPATIENT)
Dept: LAB | Facility: HOSPITAL | Age: 70
Discharge: HOME | End: 2020-01-15
Attending: INTERNAL MEDICINE
Payer: MEDICARE

## 2020-01-15 DIAGNOSIS — Z79.01 LONG TERM CURRENT USE OF ANTICOAGULANT THERAPY: ICD-10-CM

## 2020-01-15 DIAGNOSIS — Z51.81 ENCOUNTER FOR THERAPEUTIC DRUG MONITORING: ICD-10-CM

## 2020-01-15 DIAGNOSIS — D68.59 HYPERCOAGULABLE STATE (HCC): ICD-10-CM

## 2020-01-15 LAB — POC INR: 2.4 (ref 0.8–1.3)

## 2020-01-15 PROCEDURE — 85610 PROTHROMBIN TIME: CPT | Performed by: INTERNAL MEDICINE

## 2020-02-10 ENCOUNTER — HOSPITAL ENCOUNTER (OUTPATIENT)
Dept: LAB | Facility: HOSPITAL | Age: 70
Discharge: HOME | End: 2020-02-10
Attending: INTERNAL MEDICINE
Payer: MEDICARE

## 2020-02-10 DIAGNOSIS — D68.59 HYPERCOAGULABLE STATE (HCC): ICD-10-CM

## 2020-02-10 LAB — POC INR: 2.6 (ref 0.8–1.3)

## 2020-02-10 PROCEDURE — 85610 PROTHROMBIN TIME: CPT | Performed by: INTERNAL MEDICINE

## 2020-03-13 ENCOUNTER — HOSPITAL ENCOUNTER (OUTPATIENT)
Dept: LAB | Facility: HOSPITAL | Age: 70
Discharge: HOME | End: 2020-03-13
Attending: INTERNAL MEDICINE
Payer: MEDICARE

## 2020-03-13 DIAGNOSIS — Z51.81 ENCOUNTER FOR THERAPEUTIC DRUG MONITORING: ICD-10-CM

## 2020-03-13 DIAGNOSIS — Z79.01 LONG TERM CURRENT USE OF ANTICOAGULANT THERAPY: ICD-10-CM

## 2020-03-13 DIAGNOSIS — D68.59 HYPERCOAGULABLE STATE (HCC): ICD-10-CM

## 2020-03-13 LAB — POC INR: 2.5 (ref 0.8–1.3)

## 2020-03-13 PROCEDURE — 85610 PROTHROMBIN TIME: CPT | Performed by: INTERNAL MEDICINE

## 2020-04-30 ENCOUNTER — HOSPITAL ENCOUNTER (OUTPATIENT)
Dept: LAB | Facility: HOSPITAL | Age: 70
Discharge: HOME | End: 2020-04-30
Attending: INTERNAL MEDICINE
Payer: MEDICARE

## 2020-04-30 DIAGNOSIS — D68.59 HYPERCOAGULABLE STATE (HCC): ICD-10-CM

## 2020-04-30 DIAGNOSIS — Z51.81 ENCOUNTER FOR THERAPEUTIC DRUG MONITORING: ICD-10-CM

## 2020-04-30 DIAGNOSIS — Z79.01 LONG TERM CURRENT USE OF ANTICOAGULANT THERAPY: ICD-10-CM

## 2020-04-30 PROCEDURE — 85610 PROTHROMBIN TIME: CPT | Performed by: INTERNAL MEDICINE

## 2020-06-22 ENCOUNTER — HOSPITAL ENCOUNTER (OUTPATIENT)
Dept: MAMMOGRAPHY | Facility: HOSPITAL | Age: 70
Discharge: HOME | End: 2020-06-22
Attending: SURGERY
Payer: MEDICARE

## 2020-06-22 ENCOUNTER — HOSPITAL ENCOUNTER (OUTPATIENT)
Dept: LAB | Facility: HOSPITAL | Age: 70
Discharge: HOME | End: 2020-06-22
Attending: INTERNAL MEDICINE
Payer: MEDICARE

## 2020-06-22 DIAGNOSIS — Z12.31 ENCOUNTER FOR SCREENING MAMMOGRAM FOR MALIGNANT NEOPLASM OF BREAST: ICD-10-CM

## 2020-06-22 DIAGNOSIS — Z79.01 LONG TERM CURRENT USE OF ANTICOAGULANT THERAPY: ICD-10-CM

## 2020-06-22 DIAGNOSIS — D68.59 HYPERCOAGULABLE STATE (HCC): ICD-10-CM

## 2020-06-22 DIAGNOSIS — Z51.81 ENCOUNTER FOR THERAPEUTIC DRUG MONITORING: ICD-10-CM

## 2020-06-22 DIAGNOSIS — Z90.11 S/P RIGHT MASTECTOMY: ICD-10-CM

## 2020-06-22 PROCEDURE — 77063 BREAST TOMOSYNTHESIS BI: CPT | Performed by: SURGERY

## 2020-06-22 PROCEDURE — 85610 PROTHROMBIN TIME: CPT | Performed by: INTERNAL MEDICINE

## 2020-06-22 PROCEDURE — 77067 SCR MAMMO BI INCL CAD: CPT | Performed by: SURGERY

## 2020-06-25 ENCOUNTER — HOSPITAL ENCOUNTER (OUTPATIENT)
Dept: GENERAL RADIOLOGY | Facility: HOSPITAL | Age: 70
Discharge: HOME | End: 2020-06-25
Attending: INTERNAL MEDICINE
Payer: MEDICARE

## 2020-06-25 DIAGNOSIS — R31.9 HEMATURIA, UNSPECIFIED TYPE: ICD-10-CM

## 2020-06-25 DIAGNOSIS — R73.01 IFG (IMPAIRED FASTING GLUCOSE): ICD-10-CM

## 2020-06-25 PROCEDURE — 83036 HEMOGLOBIN GLYCOSYLATED A1C: CPT

## 2020-06-25 PROCEDURE — 82565 ASSAY OF CREATININE: CPT

## 2020-06-25 PROCEDURE — 84520 ASSAY OF UREA NITROGEN: CPT

## 2020-07-13 ENCOUNTER — HOSPITAL ENCOUNTER (OUTPATIENT)
Dept: GENERAL RADIOLOGY | Facility: HOSPITAL | Age: 70
Discharge: HOME | End: 2020-07-13
Attending: ORTHOPAEDIC SURGERY
Payer: MEDICARE

## 2020-07-13 DIAGNOSIS — Z96.652 S/P TOTAL KNEE REPLACEMENT, LEFT: ICD-10-CM

## 2020-07-13 LAB
CRP SERPL-MCNC: 1.34 MG/DL (ref ?–0.3)
SED RATE-ML: 53 MM/HR (ref 0–25)

## 2020-07-13 PROCEDURE — 85652 RBC SED RATE AUTOMATED: CPT | Performed by: ORTHOPAEDIC SURGERY

## 2020-07-13 PROCEDURE — 86140 C-REACTIVE PROTEIN: CPT | Performed by: ORTHOPAEDIC SURGERY

## 2020-07-15 ENCOUNTER — HOSPITAL (OUTPATIENT)
Dept: OTHER | Age: 70
End: 2020-07-15
Attending: INTERNAL MEDICINE

## 2020-08-01 ENCOUNTER — HOSPITAL (OUTPATIENT)
Dept: OTHER | Age: 70
End: 2020-08-01
Attending: INTERNAL MEDICINE

## 2020-08-03 ENCOUNTER — HOSPITAL ENCOUNTER (OUTPATIENT)
Dept: GENERAL RADIOLOGY | Facility: HOSPITAL | Age: 70
Discharge: HOME | End: 2020-08-03
Attending: INTERNAL MEDICINE
Payer: MEDICARE

## 2020-08-03 ENCOUNTER — HOSPITAL ENCOUNTER (OUTPATIENT)
Dept: LAB | Facility: HOSPITAL | Age: 70
Discharge: HOME | End: 2020-08-03
Attending: INTERNAL MEDICINE
Payer: MEDICARE

## 2020-08-03 DIAGNOSIS — Z51.81 ENCOUNTER FOR THERAPEUTIC DRUG MONITORING: ICD-10-CM

## 2020-08-03 DIAGNOSIS — D68.59 HYPERCOAGULABLE STATE (HCC): ICD-10-CM

## 2020-08-03 DIAGNOSIS — Z79.01 LONG TERM CURRENT USE OF ANTICOAGULANT THERAPY: ICD-10-CM

## 2020-08-03 DIAGNOSIS — M32.9 SYSTEMIC LUPUS ERYTHEMATOSUS, UNSPECIFIED SLE TYPE, UNSPECIFIED ORGAN INVOLVEMENT STATUS (HCC): ICD-10-CM

## 2020-08-03 DIAGNOSIS — Z79.899 ENCOUNTER FOR LONG-TERM (CURRENT) USE OF HIGH-RISK MEDICATION: ICD-10-CM

## 2020-08-03 LAB
ALBUMIN SERPL-MCNC: 3.5 G/DL (ref 3.4–5)
ALP LIVER SERPL-CCNC: 70 U/L (ref 55–142)
ALT SERPL-CCNC: 18 U/L (ref 13–56)
AST SERPL-CCNC: 13 U/L (ref 15–37)
BASOPHILS # BLD AUTO: 0.02 X10(3) UL (ref 0–0.2)
BASOPHILS NFR BLD AUTO: 0.3 %
BILIRUB DIRECT SERPL-MCNC: <0.1 MG/DL (ref 0–0.2)
BILIRUB SERPL-MCNC: 0.3 MG/DL (ref 0.1–2)
BILIRUB UR QL STRIP.AUTO: NEGATIVE
BUN BLD-MCNC: 15 MG/DL (ref 7–18)
C3 SERPL-MCNC: 141 MG/DL (ref 90–180)
C4 SERPL-MCNC: 39.5 MG/DL (ref 10–40)
CLARITY UR REFRACT.AUTO: CLEAR
COLOR UR AUTO: YELLOW
CREAT BLD-MCNC: 0.78 MG/DL (ref 0.55–1.02)
CREAT UR-SCNC: 79.9 MG/DL
CRP SERPL-MCNC: 1.09 MG/DL (ref ?–0.3)
DEPRECATED RDW RBC AUTO: 42.5 FL (ref 35.1–46.3)
EOSINOPHIL # BLD AUTO: 0.12 X10(3) UL (ref 0–0.7)
EOSINOPHIL NFR BLD AUTO: 1.8 %
ERYTHROCYTE [DISTWIDTH] IN BLOOD BY AUTOMATED COUNT: 14.5 % (ref 11–15)
GLUCOSE UR STRIP.AUTO-MCNC: NEGATIVE MG/DL
HCT VFR BLD AUTO: 32.9 % (ref 35–48)
HGB BLD-MCNC: 10.5 G/DL (ref 12–16)
IMM GRANULOCYTES # BLD AUTO: 0.03 X10(3) UL (ref 0–1)
IMM GRANULOCYTES NFR BLD: 0.5 %
KETONES UR STRIP.AUTO-MCNC: NEGATIVE MG/DL
LYMPHOCYTES # BLD AUTO: 1.54 X10(3) UL (ref 1–4)
LYMPHOCYTES NFR BLD AUTO: 23.6 %
M PROTEIN MFR SERPL ELPH: 7.3 G/DL (ref 6.4–8.2)
MCH RBC QN AUTO: 25.7 PG (ref 26–34)
MCHC RBC AUTO-ENTMCNC: 31.9 G/DL (ref 31–37)
MCV RBC AUTO: 80.4 FL (ref 80–100)
MONOCYTES # BLD AUTO: 0.56 X10(3) UL (ref 0.1–1)
MONOCYTES NFR BLD AUTO: 8.6 %
NEUTROPHILS # BLD AUTO: 4.26 X10 (3) UL (ref 1.5–7.7)
NEUTROPHILS # BLD AUTO: 4.26 X10(3) UL (ref 1.5–7.7)
NEUTROPHILS NFR BLD AUTO: 65.2 %
NITRITE UR QL STRIP.AUTO: NEGATIVE
PH UR STRIP.AUTO: 6 [PH] (ref 4.5–8)
PLATELET # BLD AUTO: 206 10(3)UL (ref 150–450)
POC INR: 2.9 (ref 0.8–1.3)
PROT UR STRIP.AUTO-MCNC: 30 MG/DL
PROT UR-MCNC: 46.3 MG/DL
PROT/CREAT UR-RTO: 0.58
RBC # BLD AUTO: 4.09 X10(6)UL (ref 3.8–5.3)
RBC #/AREA URNS AUTO: >10 /HPF
SED RATE-ML: 53 MM/HR (ref 0–25)
SP GR UR STRIP.AUTO: 1.01 (ref 1–1.03)
UROBILINOGEN UR STRIP.AUTO-MCNC: <2 MG/DL
WBC # BLD AUTO: 6.5 X10(3) UL (ref 4–11)

## 2020-08-03 PROCEDURE — 86160 COMPLEMENT ANTIGEN: CPT

## 2020-08-03 PROCEDURE — 86140 C-REACTIVE PROTEIN: CPT

## 2020-08-03 PROCEDURE — 87086 URINE CULTURE/COLONY COUNT: CPT

## 2020-08-03 PROCEDURE — 85025 COMPLETE CBC W/AUTO DIFF WBC: CPT

## 2020-08-03 PROCEDURE — 85610 PROTHROMBIN TIME: CPT | Performed by: INTERNAL MEDICINE

## 2020-08-03 PROCEDURE — 80076 HEPATIC FUNCTION PANEL: CPT

## 2020-08-03 PROCEDURE — 86256 FLUORESCENT ANTIBODY TITER: CPT

## 2020-08-03 PROCEDURE — 84156 ASSAY OF PROTEIN URINE: CPT

## 2020-08-03 PROCEDURE — 85652 RBC SED RATE AUTOMATED: CPT

## 2020-08-03 PROCEDURE — 82570 ASSAY OF URINE CREATININE: CPT

## 2020-08-03 PROCEDURE — 82565 ASSAY OF CREATININE: CPT

## 2020-08-03 PROCEDURE — 81001 URINALYSIS AUTO W/SCOPE: CPT

## 2020-08-03 PROCEDURE — 84520 ASSAY OF UREA NITROGEN: CPT

## 2020-08-04 NOTE — PROGRESS NOTES
Skritter message sent to patient. Zahida,  Labs are stable. Anemia persists but unchanged since last labs. Urine culture is pending. One test (dsDNA) is also still pending; will send you the result when it returns. Continue current treatment plan.

## 2020-08-05 NOTE — PROGRESS NOTES
BeMyEye message sent to patient. Urine culture is negative. One test (dsDNA) is still pending; will send you the result when it returns.

## 2020-09-01 ENCOUNTER — HOSPITAL (OUTPATIENT)
Dept: OTHER | Age: 70
End: 2020-09-01
Attending: INTERNAL MEDICINE

## 2020-09-14 ENCOUNTER — PRIOR ORIGINAL RECORDS (OUTPATIENT)
Dept: OTHER | Age: 70
End: 2020-09-14

## 2020-09-17 ENCOUNTER — HOSPITAL ENCOUNTER (OUTPATIENT)
Dept: LAB | Facility: HOSPITAL | Age: 70
Discharge: HOME | End: 2020-09-17
Attending: INTERNAL MEDICINE
Payer: MEDICARE

## 2020-09-17 DIAGNOSIS — D68.59 HYPERCOAGULABLE STATE (HCC): ICD-10-CM

## 2020-09-17 DIAGNOSIS — Z79.01 LONG TERM CURRENT USE OF ANTICOAGULANT THERAPY: ICD-10-CM

## 2020-09-17 DIAGNOSIS — Z51.81 ENCOUNTER FOR THERAPEUTIC DRUG MONITORING: ICD-10-CM

## 2020-09-17 LAB — POC INR: 2.3 (ref 0.8–1.3)

## 2020-09-17 PROCEDURE — 85610 PROTHROMBIN TIME: CPT | Performed by: INTERNAL MEDICINE

## 2020-10-09 LAB
% SATURATION: 28 % (CALC) (ref 11–50)
ALBUMIN/GLOB SERPL: 1.5 (CALC) (ref 1–2.5)
ALBUMIN: 4.1 G/DL (ref 3.6–5.1)
ALKALINE PHOSPHATASE: 74 UNIT/L (ref 33–130)
ALT: 10 UNIT/L (ref 6–29)
AST: 16 UNIT/L (ref 10–35)
BASO%: 0.3 %
BASO: 0 10^3/UL
BILIRUBIN, TOTAL: 0.2 MG/DL (ref 0.2–1.2)
BUN/CREATININE RATIO: ABNORMAL (CALC) (ref 6–22)
CA 27.29: 27 UNIT/ML
CA 27.29: 32 UNIT/ML
CALCIUM: 9.1 MG/DL (ref 8.6–10.4)
CARBON DIOXIDE: 19 MMOL/L (ref 20–32)
CHLORIDE: 110 MMOL/L (ref 98–110)
CRCL (C&G) (MOSAIQ HL): 76.95 ML/MIN
CREATININE CLEARANCE (MOSAIQ HL): 79.8 ML/MIN
CREATININE: 0.69 MG/DL (ref 0.5–0.99)
EGFR AFRICAN AMERICAN: 105 ML/MIN/1.73M2
EGFR NON-AFR. AMERICAN: 91 ML/MIN/1.73M2
EOS%: 1.6 %
EOS: 0.1 10^3/UL
GLOBULIN: 2.7 G/DL (CALC) (ref 1.9–3.7)
GLUCOSE: 94 MG/DL (ref 65–99)
HCT: 33.8 % (ref 38–54)
HGB: 11.3 G/DL (ref 12–18)
IRON BINDING CAPACITY: 233 MCG/DL (CALC) (ref 250–450)
IRON, TOTAL: 66 MCG/DL (ref 45–160)
LYMPH%: 23.4 % (ref 12–44)
LYMPH: 1.7 10^3/UL (ref 0.8–2.8)
MCH: 26.2 PG (ref 26–33)
MCHC: 33.4 G/DL (ref 31–36)
MCV: 78.2 FML (ref 82–100)
MONO%: 11.2 % (ref 2–12)
MONO: 0.8 10^3/UL (ref 0.2–1)
MPV: 11.8 FML (ref 8.6–11.7)
NEUT%: 63.5 % (ref 47–76)
NEUT: 4.7 10^3/UL (ref 1.5–7.1)
PLT: 204 10^3/UL (ref 150–375)
POTASSIUM: 3.9 MMOL/L (ref 3.5–5.3)
PROTEIN, TOTAL: 6.8 G/DL (ref 6.1–8.1)
RBC: 4.32 10^6/UL (ref 4.2–6.2)
RDW-CV: 13.9 %
RDW-SD: 38.5 FML (ref 36–50)
SODIUM: 145 MMOL/L (ref 135–146)
UREA NITROGEN (BUN): 13 MG/DL (ref 7–25)
WBC: 7.4 10^3/UL (ref 4.3–11)

## 2020-10-11 VITALS
SYSTOLIC BLOOD PRESSURE: 136 MMHG | DIASTOLIC BLOOD PRESSURE: 82 MMHG | HEIGHT: 60 IN | BODY MASS INDEX: 26.31 KG/M2 | WEIGHT: 134 LBS

## 2020-10-11 VITALS — DIASTOLIC BLOOD PRESSURE: 82 MMHG | WEIGHT: 134 LBS | SYSTOLIC BLOOD PRESSURE: 140 MMHG

## 2020-10-11 VITALS — SYSTOLIC BLOOD PRESSURE: 150 MMHG | DIASTOLIC BLOOD PRESSURE: 80 MMHG | WEIGHT: 136.99 LBS

## 2020-10-11 VITALS
SYSTOLIC BLOOD PRESSURE: 134 MMHG | HEIGHT: 60 IN | WEIGHT: 142.99 LBS | BODY MASS INDEX: 28.07 KG/M2 | DIASTOLIC BLOOD PRESSURE: 82 MMHG

## 2020-10-11 VITALS
BODY MASS INDEX: 27.68 KG/M2 | DIASTOLIC BLOOD PRESSURE: 69 MMHG | HEIGHT: 60 IN | SYSTOLIC BLOOD PRESSURE: 157 MMHG | WEIGHT: 141.01 LBS

## 2020-10-11 VITALS — WEIGHT: 147 LBS | DIASTOLIC BLOOD PRESSURE: 74 MMHG | SYSTOLIC BLOOD PRESSURE: 154 MMHG

## 2020-10-29 PROBLEM — Z85.3 HISTORY OF RIGHT BREAST CANCER: Status: ACTIVE | Noted: 2020-10-29

## 2020-10-29 PROBLEM — M32.9 SYSTEMIC LUPUS (CMD): Status: ACTIVE | Noted: 2020-10-29

## 2020-10-30 ENCOUNTER — LAB SERVICES (OUTPATIENT)
Dept: INFUSION THERAPY | Age: 70
End: 2020-10-30
Attending: INTERNAL MEDICINE

## 2020-10-30 ENCOUNTER — OFFICE VISIT (OUTPATIENT)
Dept: INFUSION THERAPY | Age: 70
End: 2020-10-30
Attending: INTERNAL MEDICINE

## 2020-10-30 ENCOUNTER — HOSPITAL ENCOUNTER (OUTPATIENT)
Dept: LAB | Facility: HOSPITAL | Age: 70
Discharge: HOME | End: 2020-10-30
Attending: INTERNAL MEDICINE
Payer: MEDICARE

## 2020-10-30 VITALS
SYSTOLIC BLOOD PRESSURE: 152 MMHG | BODY MASS INDEX: 27.19 KG/M2 | WEIGHT: 139.2 LBS | DIASTOLIC BLOOD PRESSURE: 70 MMHG | HEART RATE: 59 BPM

## 2020-10-30 DIAGNOSIS — Z79.01 LONG TERM (CURRENT) USE OF ANTICOAGULANTS: ICD-10-CM

## 2020-10-30 DIAGNOSIS — Z51.81 ENCOUNTER FOR THERAPEUTIC DRUG MONITORING: ICD-10-CM

## 2020-10-30 DIAGNOSIS — D68.59 HYPERCOAGULABLE STATE (HCC): ICD-10-CM

## 2020-10-30 DIAGNOSIS — Z85.3 HISTORY OF RIGHT BREAST CANCER: Primary | ICD-10-CM

## 2020-10-30 DIAGNOSIS — Z79.01 LONG TERM CURRENT USE OF ANTICOAGULANT THERAPY: ICD-10-CM

## 2020-10-30 PROCEDURE — 85610 PROTHROMBIN TIME: CPT

## 2020-10-30 PROCEDURE — 85025 COMPLETE CBC W/AUTO DIFF WBC: CPT

## 2020-10-30 PROCEDURE — 99213 OFFICE O/P EST LOW 20 MIN: CPT | Performed by: INTERNAL MEDICINE

## 2020-10-30 PROCEDURE — 36591 DRAW BLOOD OFF VENOUS DEVICE: CPT

## 2020-10-30 ASSESSMENT — PATIENT HEALTH QUESTIONNAIRE - PHQ9
SUM OF ALL RESPONSES TO PHQ9 QUESTIONS 1 AND 2: 2
1. LITTLE INTEREST OR PLEASURE IN DOING THINGS: SEVERAL DAYS
CLINICAL INTERPRETATION OF PHQ9 SCORE: NO FURTHER SCREENING NEEDED
CLINICAL INTERPRETATION OF PHQ2 SCORE: NO FURTHER SCREENING NEEDED
2. FEELING DOWN, DEPRESSED OR HOPELESS: SEVERAL DAYS
SUM OF ALL RESPONSES TO PHQ9 QUESTIONS 1 AND 2: 2

## 2020-10-30 ASSESSMENT — ENCOUNTER SYMPTOMS
RESPIRATORY NEGATIVE: 1
ALLERGIC/IMMUNOLOGIC NEGATIVE: 1
ENDOCRINE NEGATIVE: 1

## 2020-10-31 LAB
ALBUMIN SERPL-MCNC: 3.8 G/DL (ref 3.6–5.1)
ALBUMIN/GLOB SERPL: 1.1 {RATIO} (ref 1–2.4)
ALP SERPL-CCNC: 78 UNITS/L (ref 45–117)
ALT SERPL-CCNC: 21 UNITS/L
ANION GAP SERPL CALC-SCNC: 9 MMOL/L (ref 10–20)
AST SERPL-CCNC: 20 UNITS/L
BILIRUB SERPL-MCNC: 0.2 MG/DL (ref 0.2–1)
BUN SERPL-MCNC: 13 MG/DL (ref 6–20)
BUN/CREAT SERPL: 16 (ref 7–25)
CALCIUM SERPL-MCNC: 9.2 MG/DL (ref 8.4–10.2)
CANCER AG27-29 SERPL-ACNC: 25.8 UNITS/ML (ref 0–40)
CHLORIDE SERPL-SCNC: 111 MMOL/L (ref 98–107)
CO2 SERPL-SCNC: 28 MMOL/L (ref 21–32)
CREAT SERPL-MCNC: 0.81 MG/DL (ref 0.51–0.95)
GLOBULIN SER-MCNC: 3.5 G/DL (ref 2–4)
GLUCOSE SERPL-MCNC: 91 MG/DL (ref 65–99)
LENGTH OF FAST TIME PATIENT: ABNORMAL HRS
POTASSIUM SERPL-SCNC: 3.9 MMOL/L (ref 3.4–5.1)
PROT SERPL-MCNC: 7.3 G/DL (ref 6.4–8.2)
SODIUM SERPL-SCNC: 144 MMOL/L (ref 135–145)

## 2020-11-04 ENCOUNTER — TELEPHONE (OUTPATIENT)
Dept: INFUSION THERAPY | Age: 70
End: 2020-11-04

## 2020-12-02 ENCOUNTER — HOSPITAL ENCOUNTER (OUTPATIENT)
Dept: GENERAL RADIOLOGY | Facility: HOSPITAL | Age: 70
Discharge: HOME | End: 2020-12-02
Attending: INTERNAL MEDICINE
Payer: MEDICARE

## 2020-12-02 DIAGNOSIS — Z79.01 LONG TERM CURRENT USE OF ANTICOAGULANT THERAPY: ICD-10-CM

## 2020-12-02 DIAGNOSIS — Z79.899 ENCOUNTER FOR LONG-TERM (CURRENT) USE OF HIGH-RISK MEDICATION: ICD-10-CM

## 2020-12-02 DIAGNOSIS — D68.59 HYPERCOAGULABLE STATE (HCC): ICD-10-CM

## 2020-12-02 DIAGNOSIS — Z51.81 ENCOUNTER FOR THERAPEUTIC DRUG MONITORING: ICD-10-CM

## 2020-12-02 DIAGNOSIS — M32.9 SYSTEMIC LUPUS ERYTHEMATOSUS, UNSPECIFIED SLE TYPE, UNSPECIFIED ORGAN INVOLVEMENT STATUS (HCC): ICD-10-CM

## 2020-12-02 PROCEDURE — 36591 DRAW BLOOD OFF VENOUS DEVICE: CPT

## 2020-12-02 PROCEDURE — 86160 COMPLEMENT ANTIGEN: CPT

## 2020-12-02 PROCEDURE — 80076 HEPATIC FUNCTION PANEL: CPT

## 2020-12-02 PROCEDURE — 82570 ASSAY OF URINE CREATININE: CPT

## 2020-12-02 PROCEDURE — 86140 C-REACTIVE PROTEIN: CPT

## 2020-12-02 PROCEDURE — 81001 URINALYSIS AUTO W/SCOPE: CPT

## 2020-12-02 PROCEDURE — 82565 ASSAY OF CREATININE: CPT

## 2020-12-02 PROCEDURE — 84156 ASSAY OF PROTEIN URINE: CPT

## 2020-12-02 PROCEDURE — 86256 FLUORESCENT ANTIBODY TITER: CPT

## 2020-12-02 PROCEDURE — 84520 ASSAY OF UREA NITROGEN: CPT

## 2020-12-03 NOTE — PROGRESS NOTES
Leon message sent to patient. Bora Alex far, your labs are stable. Few lab tests are still pending; will send you the results when they return.   Take care,  Dr. Justin Rouse

## 2020-12-16 ENCOUNTER — HOSPITAL ENCOUNTER (OUTPATIENT)
Dept: LAB | Facility: HOSPITAL | Age: 70
Discharge: HOME | End: 2020-12-16
Attending: INTERNAL MEDICINE
Payer: MEDICARE

## 2020-12-16 DIAGNOSIS — Z79.01 LONG TERM CURRENT USE OF ANTICOAGULANT THERAPY: ICD-10-CM

## 2020-12-16 DIAGNOSIS — D68.59 HYPERCOAGULABLE STATE (HCC): ICD-10-CM

## 2020-12-16 PROCEDURE — 85610 PROTHROMBIN TIME: CPT | Performed by: INTERNAL MEDICINE

## 2020-12-31 ENCOUNTER — PRIOR ORIGINAL RECORDS (OUTPATIENT)
Dept: OTHER | Age: 70
End: 2020-12-31

## 2021-04-01 ENCOUNTER — HOSPITAL ENCOUNTER (OUTPATIENT)
Dept: GENERAL RADIOLOGY | Facility: HOSPITAL | Age: 71
Discharge: HOME | End: 2021-04-01
Attending: INTERNAL MEDICINE
Payer: MEDICARE

## 2021-04-01 DIAGNOSIS — Z79.899 ENCOUNTER FOR LONG-TERM (CURRENT) USE OF HIGH-RISK MEDICATION: ICD-10-CM

## 2021-04-01 DIAGNOSIS — Z51.81 ENCOUNTER FOR THERAPEUTIC DRUG MONITORING: ICD-10-CM

## 2021-04-01 DIAGNOSIS — M32.9 LUPUS (HCC): ICD-10-CM

## 2021-04-01 DIAGNOSIS — R80.9 PROTEINURIA, UNSPECIFIED TYPE: ICD-10-CM

## 2021-04-01 DIAGNOSIS — Z79.01 LONG TERM (CURRENT) USE OF ANTICOAGULANTS: ICD-10-CM

## 2021-04-01 DIAGNOSIS — I10 ESSENTIAL HYPERTENSION: ICD-10-CM

## 2021-04-01 DIAGNOSIS — N20.0 NEPHROLITHIASIS: ICD-10-CM

## 2021-04-01 DIAGNOSIS — R30.0 DYSURIA: ICD-10-CM

## 2021-04-01 DIAGNOSIS — M32.9 SYSTEMIC LUPUS ERYTHEMATOSUS, UNSPECIFIED SLE TYPE, UNSPECIFIED ORGAN INVOLVEMENT STATUS (HCC): ICD-10-CM

## 2021-04-01 DIAGNOSIS — D68.59 HYPERCOAGULABLE STATE (HCC): ICD-10-CM

## 2021-04-01 DIAGNOSIS — N17.9 AKI (ACUTE KIDNEY INJURY) (HCC): ICD-10-CM

## 2021-04-01 DIAGNOSIS — Z79.01 LONG TERM CURRENT USE OF ANTICOAGULANT THERAPY: ICD-10-CM

## 2021-04-01 PROCEDURE — 86140 C-REACTIVE PROTEIN: CPT

## 2021-04-01 PROCEDURE — 82570 ASSAY OF URINE CREATININE: CPT | Performed by: INTERNAL MEDICINE

## 2021-04-01 PROCEDURE — 82043 UR ALBUMIN QUANTITATIVE: CPT | Performed by: INTERNAL MEDICINE

## 2021-04-01 PROCEDURE — 86256 FLUORESCENT ANTIBODY TITER: CPT | Performed by: INTERNAL MEDICINE

## 2021-04-01 PROCEDURE — 85652 RBC SED RATE AUTOMATED: CPT

## 2021-04-01 PROCEDURE — 80076 HEPATIC FUNCTION PANEL: CPT | Performed by: INTERNAL MEDICINE

## 2021-04-01 PROCEDURE — 82565 ASSAY OF CREATININE: CPT

## 2021-04-01 PROCEDURE — 83735 ASSAY OF MAGNESIUM: CPT | Performed by: INTERNAL MEDICINE

## 2021-04-01 PROCEDURE — 85610 PROTHROMBIN TIME: CPT

## 2021-04-01 PROCEDURE — 84520 ASSAY OF UREA NITROGEN: CPT

## 2021-04-01 PROCEDURE — 80069 RENAL FUNCTION PANEL: CPT | Performed by: INTERNAL MEDICINE

## 2021-04-01 PROCEDURE — 87086 URINE CULTURE/COLONY COUNT: CPT

## 2021-04-01 PROCEDURE — 84156 ASSAY OF PROTEIN URINE: CPT | Performed by: INTERNAL MEDICINE

## 2021-04-01 PROCEDURE — 85025 COMPLETE CBC W/AUTO DIFF WBC: CPT | Performed by: INTERNAL MEDICINE

## 2021-04-01 PROCEDURE — 81001 URINALYSIS AUTO W/SCOPE: CPT

## 2021-04-01 PROCEDURE — 36591 DRAW BLOOD OFF VENOUS DEVICE: CPT

## 2021-04-01 NOTE — PROGRESS NOTES
Patient active on Tap[employer]. Test results released to [address] [place] [address] [address].

## 2021-04-02 NOTE — PROGRESS NOTES
Noted.    Future Appointments  4/23/2021  10:40 AM   Krystle Curry MD            [facility]  4/29/2021  9:00 AM    Damaris Bone MD          [facility]  5/7/2021   10:00 AM   MD Patt Ibrahim

## 2021-04-02 NOTE — PROGRESS NOTES
Zahida,  Chronic anemia persists. Inflammation markers are still elevated but better than last labs. Urine protein levels are normal but there are some white blood cells in the urine indicating possible infection. Urine culture is pending.    Few lab t

## 2021-08-09 ENCOUNTER — LAB ENCOUNTER (OUTPATIENT)
Dept: LAB | Facility: HOSPITAL | Age: 71
End: 2021-08-09
Attending: INTERNAL MEDICINE
Payer: MEDICARE

## 2021-08-09 DIAGNOSIS — Z79.899 ENCOUNTER FOR LONG-TERM (CURRENT) USE OF HIGH-RISK MEDICATION: ICD-10-CM

## 2021-08-09 DIAGNOSIS — D68.59 HYPERCOAGULABLE STATE (HCC): ICD-10-CM

## 2021-08-09 DIAGNOSIS — M32.9 SYSTEMIC LUPUS ERYTHEMATOSUS, UNSPECIFIED SLE TYPE, UNSPECIFIED ORGAN INVOLVEMENT STATUS (HCC): ICD-10-CM

## 2021-08-09 DIAGNOSIS — Z79.01 LONG TERM CURRENT USE OF ANTICOAGULANT THERAPY: ICD-10-CM

## 2021-08-09 DIAGNOSIS — Z51.81 ENCOUNTER FOR THERAPEUTIC DRUG MONITORING: ICD-10-CM

## 2021-08-09 LAB
ALBUMIN SERPL-MCNC: 3.6 G/DL (ref 3.4–5)
ALP LIVER SERPL-CCNC: 74 U/L
ALT SERPL-CCNC: 27 U/L
AST SERPL-CCNC: 20 U/L (ref 15–37)
BASOPHILS # BLD AUTO: 0.03 X10(3) UL (ref 0–0.2)
BASOPHILS NFR BLD AUTO: 0.5 %
BILIRUB DIRECT SERPL-MCNC: 0.1 MG/DL (ref 0–0.2)
BILIRUB SERPL-MCNC: 0.2 MG/DL (ref 0.1–2)
BILIRUB UR QL STRIP.AUTO: NEGATIVE
BUN BLD-MCNC: 15 MG/DL (ref 7–18)
C3 SERPL-MCNC: 135 MG/DL (ref 90–180)
C4 SERPL-MCNC: 37.7 MG/DL (ref 10–40)
CLARITY UR REFRACT.AUTO: CLEAR
COLOR UR AUTO: YELLOW
CREAT BLD-MCNC: 0.82 MG/DL
CREAT UR-SCNC: 75.4 MG/DL
CRP SERPL-MCNC: 1.01 MG/DL (ref ?–0.3)
EOSINOPHIL # BLD AUTO: 0.13 X10(3) UL (ref 0–0.7)
EOSINOPHIL NFR BLD AUTO: 2 %
ERYTHROCYTE [DISTWIDTH] IN BLOOD BY AUTOMATED COUNT: 14.3 %
GLUCOSE UR STRIP.AUTO-MCNC: NEGATIVE MG/DL
HCT VFR BLD AUTO: 32.1 %
HGB BLD-MCNC: 10.6 G/DL
IMM GRANULOCYTES # BLD AUTO: 0.03 X10(3) UL (ref 0–1)
IMM GRANULOCYTES NFR BLD: 0.5 %
INR BLD: 2.26 (ref 0.89–1.11)
KETONES UR STRIP.AUTO-MCNC: NEGATIVE MG/DL
LYMPHOCYTES # BLD AUTO: 1.96 X10(3) UL (ref 1–4)
LYMPHOCYTES NFR BLD AUTO: 29.7 %
M PROTEIN MFR SERPL ELPH: 7.3 G/DL (ref 6.4–8.2)
MCH RBC QN AUTO: 26.7 PG (ref 26–34)
MCHC RBC AUTO-ENTMCNC: 33 G/DL (ref 31–37)
MCV RBC AUTO: 80.9 FL
MONOCYTES # BLD AUTO: 0.61 X10(3) UL (ref 0.1–1)
MONOCYTES NFR BLD AUTO: 9.2 %
NEUTROPHILS # BLD AUTO: 3.85 X10 (3) UL (ref 1.5–7.7)
NEUTROPHILS # BLD AUTO: 3.85 X10(3) UL (ref 1.5–7.7)
NEUTROPHILS NFR BLD AUTO: 58.1 %
NITRITE UR QL STRIP.AUTO: NEGATIVE
PH UR STRIP.AUTO: 6 [PH] (ref 5–8)
PLATELET # BLD AUTO: 203 10(3)UL (ref 150–450)
PROT UR STRIP.AUTO-MCNC: NEGATIVE MG/DL
PROT UR-MCNC: 12.7 MG/DL
PROT/CREAT UR-RTO: 0.17
PSA SERPL DL<=0.01 NG/ML-MCNC: 25.5 SECONDS (ref 12.2–14.5)
RBC # BLD AUTO: 3.97 X10(6)UL
RBC UR QL AUTO: NEGATIVE
SED RATE-ML: 29 MM/HR
SP GR UR STRIP.AUTO: 1.01 (ref 1–1.03)
UROBILINOGEN UR STRIP.AUTO-MCNC: <2 MG/DL
WBC # BLD AUTO: 6.6 X10(3) UL (ref 4–11)

## 2021-08-09 PROCEDURE — 85652 RBC SED RATE AUTOMATED: CPT

## 2021-08-09 PROCEDURE — 82570 ASSAY OF URINE CREATININE: CPT

## 2021-08-09 PROCEDURE — 87086 URINE CULTURE/COLONY COUNT: CPT

## 2021-08-09 PROCEDURE — 81001 URINALYSIS AUTO W/SCOPE: CPT

## 2021-08-09 PROCEDURE — 86160 COMPLEMENT ANTIGEN: CPT

## 2021-08-09 PROCEDURE — 84156 ASSAY OF PROTEIN URINE: CPT

## 2021-08-09 PROCEDURE — 85025 COMPLETE CBC W/AUTO DIFF WBC: CPT

## 2021-08-09 PROCEDURE — 80076 HEPATIC FUNCTION PANEL: CPT

## 2021-08-09 PROCEDURE — 84520 ASSAY OF UREA NITROGEN: CPT

## 2021-08-09 PROCEDURE — 86256 FLUORESCENT ANTIBODY TITER: CPT

## 2021-08-09 PROCEDURE — 85610 PROTHROMBIN TIME: CPT

## 2021-08-09 PROCEDURE — 86140 C-REACTIVE PROTEIN: CPT

## 2021-08-09 PROCEDURE — 36415 COLL VENOUS BLD VENIPUNCTURE: CPT

## 2021-08-09 PROCEDURE — 82565 ASSAY OF CREATININE: CPT

## 2021-08-10 NOTE — PROGRESS NOTES
Results with comments previously sent to patient via [facility]. Patient has viewed [facility] [address]

## 2021-08-10 NOTE — PROGRESS NOTES
Zahida,  So far lupus labs are stable. Anemia persists but is unchanged. Urine protein remains low but there are some white blood cells in the urine again.  Looks like there are some skin cells which can indicate contamination from the skin, but culture

## 2021-08-13 NOTE — PROGRESS NOTES
Davi Kelley is mildly elevated. Have you been able to take cellcept regularly with the new smaller pills? Please follow up as planned to discuss in more detail.   Dr. Maxim Mathews

## 2021-10-04 ENCOUNTER — HOSPITAL ENCOUNTER (OUTPATIENT)
Dept: GENERAL RADIOLOGY | Facility: HOSPITAL | Age: 71
Discharge: HOME | End: 2021-10-04
Attending: INTERNAL MEDICINE
Payer: MEDICARE

## 2021-10-04 PROCEDURE — 36591 DRAW BLOOD OFF VENOUS DEVICE: CPT

## 2021-12-02 ENCOUNTER — HOSPITAL ENCOUNTER (OUTPATIENT)
Dept: GENERAL RADIOLOGY | Facility: HOSPITAL | Age: 71
Discharge: HOME | End: 2021-12-02
Attending: INTERNAL MEDICINE
Payer: MEDICARE

## 2021-12-02 ENCOUNTER — HOSPITAL ENCOUNTER (OUTPATIENT)
Dept: MAMMOGRAPHY | Facility: HOSPITAL | Age: 71
Discharge: HOME | End: 2021-12-02
Attending: SURGERY
Payer: MEDICARE

## 2021-12-02 DIAGNOSIS — R10.9 FLANK PAIN: ICD-10-CM

## 2021-12-02 DIAGNOSIS — D50.9 MICROCYTIC ANEMIA: ICD-10-CM

## 2021-12-02 DIAGNOSIS — D68.59 HYPERCOAGULABLE STATE (HCC): ICD-10-CM

## 2021-12-02 DIAGNOSIS — Z79.899 ENCOUNTER FOR LONG-TERM (CURRENT) USE OF HIGH-RISK MEDICATION: ICD-10-CM

## 2021-12-02 DIAGNOSIS — R80.9 PROTEINURIA, UNSPECIFIED TYPE: ICD-10-CM

## 2021-12-02 DIAGNOSIS — M32.9 SYSTEMIC LUPUS ERYTHEMATOSUS, UNSPECIFIED SLE TYPE, UNSPECIFIED ORGAN INVOLVEMENT STATUS (HCC): ICD-10-CM

## 2021-12-02 DIAGNOSIS — E55.9 VITAMIN D DEFICIENCY: ICD-10-CM

## 2021-12-02 DIAGNOSIS — Z12.31 ENCOUNTER FOR SCREENING MAMMOGRAM FOR MALIGNANT NEOPLASM OF BREAST: ICD-10-CM

## 2021-12-02 DIAGNOSIS — Z90.11 S/P RIGHT MASTECTOMY: ICD-10-CM

## 2021-12-02 PROCEDURE — 86160 COMPLEMENT ANTIGEN: CPT

## 2021-12-02 PROCEDURE — 84520 ASSAY OF UREA NITROGEN: CPT

## 2021-12-02 PROCEDURE — 85610 PROTHROMBIN TIME: CPT | Performed by: INTERNAL MEDICINE

## 2021-12-02 PROCEDURE — 82565 ASSAY OF CREATININE: CPT

## 2021-12-02 PROCEDURE — 80076 HEPATIC FUNCTION PANEL: CPT

## 2021-12-02 PROCEDURE — 86256 FLUORESCENT ANTIBODY TITER: CPT

## 2021-12-02 PROCEDURE — 82570 ASSAY OF URINE CREATININE: CPT | Performed by: INTERNAL MEDICINE

## 2021-12-02 PROCEDURE — 85652 RBC SED RATE AUTOMATED: CPT

## 2021-12-02 PROCEDURE — 84156 ASSAY OF PROTEIN URINE: CPT | Performed by: INTERNAL MEDICINE

## 2021-12-02 PROCEDURE — 36591 DRAW BLOOD OFF VENOUS DEVICE: CPT

## 2021-12-02 PROCEDURE — 77067 SCR MAMMO BI INCL CAD: CPT | Performed by: SURGERY

## 2021-12-02 PROCEDURE — 85025 COMPLETE CBC W/AUTO DIFF WBC: CPT

## 2021-12-02 PROCEDURE — 77063 BREAST TOMOSYNTHESIS BI: CPT | Performed by: SURGERY

## 2021-12-02 PROCEDURE — 82728 ASSAY OF FERRITIN: CPT | Performed by: INTERNAL MEDICINE

## 2021-12-02 PROCEDURE — 82306 VITAMIN D 25 HYDROXY: CPT

## 2021-12-02 PROCEDURE — 86140 C-REACTIVE PROTEIN: CPT

## 2021-12-02 PROCEDURE — 81003 URINALYSIS AUTO W/O SCOPE: CPT

## 2022-01-28 PROBLEM — R07.9 CHEST PAIN OF UNCERTAIN ETIOLOGY: Status: RESOLVED | Noted: 2019-12-09 | Resolved: 2022-01-28

## 2022-02-21 ENCOUNTER — HOSPITAL ENCOUNTER (OUTPATIENT)
Dept: GENERAL RADIOLOGY | Facility: HOSPITAL | Age: 72
Discharge: HOME | End: 2022-02-21
Attending: INTERNAL MEDICINE
Payer: MEDICARE

## 2022-02-21 DIAGNOSIS — M32.9 SYSTEMIC LUPUS ERYTHEMATOSUS, UNSPECIFIED SLE TYPE, UNSPECIFIED ORGAN INVOLVEMENT STATUS (HCC): ICD-10-CM

## 2022-02-21 DIAGNOSIS — D84.9 IMMUNOSUPPRESSED STATUS (HCC): ICD-10-CM

## 2022-02-21 DIAGNOSIS — Z79.899 ENCOUNTER FOR LONG-TERM (CURRENT) USE OF HIGH-RISK MEDICATION: ICD-10-CM

## 2022-02-21 LAB
ALBUMIN SERPL-MCNC: 3.4 G/DL (ref 3.4–5)
ALP LIVER SERPL-CCNC: 73 U/L
ALT SERPL-CCNC: 27 U/L
AST SERPL-CCNC: 17 U/L (ref 15–37)
BASOPHILS # BLD AUTO: 0.03 X10(3) UL (ref 0–0.2)
BASOPHILS NFR BLD AUTO: 0.4 %
BILIRUB DIRECT SERPL-MCNC: <0.1 MG/DL (ref 0–0.2)
BILIRUB SERPL-MCNC: 0.2 MG/DL (ref 0.1–2)
BILIRUB UR QL STRIP.AUTO: NEGATIVE
BUN BLD-MCNC: 14 MG/DL (ref 7–18)
C3 SERPL-MCNC: 148 MG/DL (ref 90–180)
C4 SERPL-MCNC: 41.9 MG/DL (ref 10–40)
CLARITY UR REFRACT.AUTO: CLEAR
CREAT BLD-MCNC: 0.76 MG/DL
CREAT UR-SCNC: 56.6 MG/DL
CRP SERPL-MCNC: 1.82 MG/DL (ref ?–0.3)
EOSINOPHIL # BLD AUTO: 0.13 X10(3) UL (ref 0–0.7)
EOSINOPHIL NFR BLD AUTO: 1.8 %
ERYTHROCYTE [DISTWIDTH] IN BLOOD BY AUTOMATED COUNT: 14.6 %
ERYTHROCYTE [SEDIMENTATION RATE] IN BLOOD: 44 MM/HR
GLUCOSE UR STRIP.AUTO-MCNC: NEGATIVE MG/DL
HCT VFR BLD AUTO: 32.4 %
HGB BLD-MCNC: 10.5 G/DL
IMM GRANULOCYTES # BLD AUTO: 0.04 X10(3) UL (ref 0–1)
IMM GRANULOCYTES NFR BLD: 0.5 %
KETONES UR STRIP.AUTO-MCNC: NEGATIVE MG/DL
LYMPHOCYTES # BLD AUTO: 1.57 X10(3) UL (ref 1–4)
LYMPHOCYTES NFR BLD AUTO: 21.3 %
MCH RBC QN AUTO: 26.2 PG (ref 26–34)
MCHC RBC AUTO-ENTMCNC: 32.4 G/DL (ref 31–37)
MCV RBC AUTO: 80.8 FL
MONOCYTES # BLD AUTO: 0.7 X10(3) UL (ref 0.1–1)
MONOCYTES NFR BLD AUTO: 9.5 %
NEUTROPHILS # BLD AUTO: 4.9 X10 (3) UL (ref 1.5–7.7)
NEUTROPHILS # BLD AUTO: 4.9 X10(3) UL (ref 1.5–7.7)
NEUTROPHILS NFR BLD AUTO: 66.5 %
PH UR STRIP.AUTO: 5 [PH] (ref 5–8)
PLATELET # BLD AUTO: 207 10(3)UL (ref 150–450)
PROT SERPL-MCNC: 7.5 G/DL (ref 6.4–8.2)
PROT UR STRIP.AUTO-MCNC: NEGATIVE MG/DL
PROT UR-MCNC: 15.8 MG/DL
PROT/CREAT UR-RTO: 0.28
RBC # BLD AUTO: 4.01 X10(6)UL
RBC UR QL AUTO: NEGATIVE
SP GR UR STRIP.AUTO: 1.01 (ref 1–1.03)
UROBILINOGEN UR STRIP.AUTO-MCNC: <2 MG/DL
WBC # BLD AUTO: 7.4 X10(3) UL (ref 4–11)

## 2022-02-21 PROCEDURE — 87086 URINE CULTURE/COLONY COUNT: CPT

## 2022-02-21 PROCEDURE — 85025 COMPLETE CBC W/AUTO DIFF WBC: CPT

## 2022-02-21 PROCEDURE — 86140 C-REACTIVE PROTEIN: CPT

## 2022-02-21 PROCEDURE — 84156 ASSAY OF PROTEIN URINE: CPT

## 2022-02-21 PROCEDURE — 84520 ASSAY OF UREA NITROGEN: CPT

## 2022-02-21 PROCEDURE — 36591 DRAW BLOOD OFF VENOUS DEVICE: CPT

## 2022-02-21 PROCEDURE — 82570 ASSAY OF URINE CREATININE: CPT

## 2022-02-21 PROCEDURE — 80076 HEPATIC FUNCTION PANEL: CPT

## 2022-02-21 PROCEDURE — 81001 URINALYSIS AUTO W/SCOPE: CPT

## 2022-02-21 PROCEDURE — 86160 COMPLEMENT ANTIGEN: CPT

## 2022-02-21 PROCEDURE — 86256 FLUORESCENT ANTIBODY TITER: CPT

## 2022-02-21 PROCEDURE — 82565 ASSAY OF CREATININE: CPT

## 2022-02-21 PROCEDURE — 85652 RBC SED RATE AUTOMATED: CPT

## 2022-02-23 NOTE — PROGRESS NOTES
Lupus labs are stable. Cellcept labs are stable. Crp, one of the inflammation markers, is slightly higher than usual. This may be due to your recent covid vaccine. Please follow up as planned to discuss in more detail.

## 2022-05-11 ENCOUNTER — HOSPITAL ENCOUNTER (OUTPATIENT)
Dept: GENERAL RADIOLOGY | Facility: HOSPITAL | Age: 72
Discharge: HOME | End: 2022-05-11
Attending: INTERNAL MEDICINE
Payer: MEDICARE

## 2022-05-11 DIAGNOSIS — M32.9 SYSTEMIC LUPUS ERYTHEMATOSUS, UNSPECIFIED SLE TYPE, UNSPECIFIED ORGAN INVOLVEMENT STATUS (HCC): ICD-10-CM

## 2022-05-11 DIAGNOSIS — D84.9 IMMUNOSUPPRESSED STATUS (HCC): ICD-10-CM

## 2022-05-11 DIAGNOSIS — R73.01 IMPAIRED FASTING GLUCOSE: ICD-10-CM

## 2022-05-11 DIAGNOSIS — R80.9 PROTEINURIA: ICD-10-CM

## 2022-05-11 DIAGNOSIS — I10 BENIGN ESSENTIAL HTN: ICD-10-CM

## 2022-05-11 DIAGNOSIS — Z79.899 ENCOUNTER FOR LONG-TERM (CURRENT) USE OF HIGH-RISK MEDICATION: ICD-10-CM

## 2022-05-11 LAB
ALBUMIN SERPL-MCNC: 3.5 G/DL (ref 3.4–5)
ALBUMIN SERPL-MCNC: 3.5 G/DL (ref 3.4–5)
ALBUMIN/GLOB SERPL: 0.9 {RATIO} (ref 1–2)
ALP LIVER SERPL-CCNC: 75 U/L
ALT SERPL-CCNC: 24 U/L
ANION GAP SERPL CALC-SCNC: 3 MMOL/L (ref 0–18)
ANION GAP SERPL CALC-SCNC: 5 MMOL/L (ref 0–18)
AST SERPL-CCNC: 20 U/L (ref 15–37)
BASOPHILS # BLD AUTO: 0.03 X10(3) UL (ref 0–0.2)
BASOPHILS NFR BLD AUTO: 0.4 %
BILIRUB DIRECT SERPL-MCNC: <0.1 MG/DL (ref 0–0.2)
BILIRUB SERPL-MCNC: 0.3 MG/DL (ref 0.1–2)
BILIRUB UR QL STRIP.AUTO: NEGATIVE
BUN BLD-MCNC: 13 MG/DL (ref 7–18)
BUN BLD-MCNC: 14 MG/DL (ref 7–18)
C3 SERPL-MCNC: 130 MG/DL (ref 90–180)
C4 SERPL-MCNC: 40.4 MG/DL (ref 10–40)
CALCIUM BLD-MCNC: 9.2 MG/DL (ref 8.5–10.1)
CALCIUM BLD-MCNC: 9.3 MG/DL (ref 8.5–10.1)
CHLORIDE SERPL-SCNC: 108 MMOL/L (ref 98–112)
CHLORIDE SERPL-SCNC: 109 MMOL/L (ref 98–112)
CHOLEST SERPL-MCNC: 182 MG/DL (ref ?–200)
CLARITY UR REFRACT.AUTO: CLEAR
CO2 SERPL-SCNC: 27 MMOL/L (ref 21–32)
CO2 SERPL-SCNC: 28 MMOL/L (ref 21–32)
COLOR UR AUTO: YELLOW
CREAT BLD-MCNC: 0.89 MG/DL
CREAT BLD-MCNC: 0.93 MG/DL
CREAT UR-SCNC: 134 MG/DL
CRP SERPL-MCNC: 0.94 MG/DL (ref ?–0.3)
EOSINOPHIL # BLD AUTO: 0.09 X10(3) UL (ref 0–0.7)
EOSINOPHIL NFR BLD AUTO: 1.1 %
ERYTHROCYTE [DISTWIDTH] IN BLOOD BY AUTOMATED COUNT: 14.5 %
ERYTHROCYTE [SEDIMENTATION RATE] IN BLOOD: 33 MM/HR
EST. AVERAGE GLUCOSE BLD GHB EST-MCNC: 120 MG/DL (ref 68–126)
FASTING PATIENT LIPID ANSWER: YES
FASTING STATUS PATIENT QL REPORTED: YES
GLOBULIN PLAS-MCNC: 3.9 G/DL (ref 2.8–4.4)
GLUCOSE BLD-MCNC: 105 MG/DL (ref 70–99)
GLUCOSE BLD-MCNC: 98 MG/DL (ref 70–99)
GLUCOSE UR STRIP.AUTO-MCNC: NEGATIVE MG/DL
HBA1C MFR BLD: 5.8 % (ref ?–5.7)
HCT VFR BLD AUTO: 33.9 %
HDLC SERPL-MCNC: 53 MG/DL (ref 40–59)
HGB BLD-MCNC: 11 G/DL
IMM GRANULOCYTES # BLD AUTO: 0.06 X10(3) UL (ref 0–1)
IMM GRANULOCYTES NFR BLD: 0.7 %
KETONES UR STRIP.AUTO-MCNC: NEGATIVE MG/DL
LDLC SERPL CALC-MCNC: 112 MG/DL (ref ?–100)
LYMPHOCYTES # BLD AUTO: 1.96 X10(3) UL (ref 1–4)
LYMPHOCYTES NFR BLD AUTO: 23.8 %
MCH RBC QN AUTO: 26.3 PG (ref 26–34)
MCHC RBC AUTO-ENTMCNC: 32.4 G/DL (ref 31–37)
MCV RBC AUTO: 81.1 FL
MONOCYTES # BLD AUTO: 0.83 X10(3) UL (ref 0.1–1)
MONOCYTES NFR BLD AUTO: 10.1 %
NEUTROPHILS # BLD AUTO: 5.26 X10 (3) UL (ref 1.5–7.7)
NEUTROPHILS # BLD AUTO: 5.26 X10(3) UL (ref 1.5–7.7)
NEUTROPHILS NFR BLD AUTO: 63.9 %
NITRITE UR QL STRIP.AUTO: NEGATIVE
NONHDLC SERPL-MCNC: 129 MG/DL (ref ?–130)
OSMOLALITY SERPL CALC.SUM OF ELEC: 288 MOSM/KG (ref 275–295)
OSMOLALITY SERPL CALC.SUM OF ELEC: 293 MOSM/KG (ref 275–295)
PH UR STRIP.AUTO: 5 [PH] (ref 5–8)
PHOSPHATE SERPL-MCNC: 2.7 MG/DL (ref 2.5–4.9)
PLATELET # BLD AUTO: 220 10(3)UL (ref 150–450)
POTASSIUM SERPL-SCNC: 3.9 MMOL/L (ref 3.5–5.1)
POTASSIUM SERPL-SCNC: 3.9 MMOL/L (ref 3.5–5.1)
PROT SERPL-MCNC: 7.4 G/DL (ref 6.4–8.2)
PROT UR STRIP.AUTO-MCNC: NEGATIVE MG/DL
PROT UR-MCNC: 20.3 MG/DL
PROT/CREAT UR-RTO: 0.15
RBC # BLD AUTO: 4.18 X10(6)UL
RBC UR QL AUTO: NEGATIVE
SODIUM SERPL-SCNC: 139 MMOL/L (ref 136–145)
SODIUM SERPL-SCNC: 141 MMOL/L (ref 136–145)
SP GR UR STRIP.AUTO: 1.02 (ref 1–1.03)
TRIGL SERPL-MCNC: 92 MG/DL (ref 30–149)
UROBILINOGEN UR STRIP.AUTO-MCNC: <2 MG/DL
VLDLC SERPL CALC-MCNC: 16 MG/DL (ref 0–30)
WBC # BLD AUTO: 8.2 X10(3) UL (ref 4–11)

## 2022-05-11 PROCEDURE — 36591 DRAW BLOOD OFF VENOUS DEVICE: CPT

## 2022-05-11 PROCEDURE — 86160 COMPLEMENT ANTIGEN: CPT

## 2022-05-11 PROCEDURE — 86480 TB TEST CELL IMMUN MEASURE: CPT

## 2022-05-11 PROCEDURE — 85652 RBC SED RATE AUTOMATED: CPT

## 2022-05-11 PROCEDURE — 82043 UR ALBUMIN QUANTITATIVE: CPT | Performed by: INTERNAL MEDICINE

## 2022-05-11 PROCEDURE — 83036 HEMOGLOBIN GLYCOSYLATED A1C: CPT

## 2022-05-11 PROCEDURE — 84156 ASSAY OF PROTEIN URINE: CPT

## 2022-05-11 PROCEDURE — 81001 URINALYSIS AUTO W/SCOPE: CPT

## 2022-05-11 PROCEDURE — 80053 COMPREHEN METABOLIC PANEL: CPT

## 2022-05-11 PROCEDURE — 82570 ASSAY OF URINE CREATININE: CPT

## 2022-05-11 PROCEDURE — 86256 FLUORESCENT ANTIBODY TITER: CPT

## 2022-05-11 PROCEDURE — 87086 URINE CULTURE/COLONY COUNT: CPT

## 2022-05-11 PROCEDURE — 86140 C-REACTIVE PROTEIN: CPT

## 2022-05-11 PROCEDURE — 80061 LIPID PANEL: CPT

## 2022-05-11 PROCEDURE — 84100 ASSAY OF PHOSPHORUS: CPT

## 2022-05-11 PROCEDURE — 82570 ASSAY OF URINE CREATININE: CPT | Performed by: INTERNAL MEDICINE

## 2022-05-11 PROCEDURE — 82248 BILIRUBIN DIRECT: CPT

## 2022-05-11 PROCEDURE — 85025 COMPLETE CBC W/AUTO DIFF WBC: CPT

## 2022-05-12 LAB
M TB IFN-G CD4+ T-CELLS BLD-ACNC: 0.03 IU/ML
M TB TUBERC IFN-G BLD QL: NEGATIVE
M TB TUBERC IGNF/MITOGEN IGNF CONTROL: 5.99 IU/ML
QFT TB1 AG MINUS NIL: 0 IU/ML
QFT TB2 AG MINUS NIL: 0 IU/ML

## 2022-05-17 NOTE — PROGRESS NOTES
Lolita Aschoff show that inflammation markers are higher than usual. Complements are normal. One test (dsDNA) is still pending; will send you the result when it returns.    There are no signs of a kidney infection and no signs of lupus related kidney inflam REFILL REQUEST FOR AMLODIPINE BESYLATE 2.5 MG TAB  Last Visit 1/72022  Last refill 2/18/2022 #90 with 0 refills   Next appointment 7/7/2022    PCP verified yes    Medication approved and sent to the pharmacy.

## 2022-06-30 ENCOUNTER — HOSPITAL ENCOUNTER (OUTPATIENT)
Dept: LAB | Facility: HOSPITAL | Age: 72
Discharge: HOME | End: 2022-06-30
Attending: INTERNAL MEDICINE
Payer: MEDICARE

## 2022-06-30 DIAGNOSIS — D68.59 HYPERCOAGULABLE STATE (HCC): ICD-10-CM

## 2022-06-30 LAB — INR BLDC: 3 (ref 0.9–1.1)

## 2022-06-30 PROCEDURE — 85610 PROTHROMBIN TIME: CPT | Performed by: INTERNAL MEDICINE

## 2022-07-27 ENCOUNTER — HOSPITAL ENCOUNTER (OUTPATIENT)
Dept: GENERAL RADIOLOGY | Facility: HOSPITAL | Age: 72
Discharge: HOME | End: 2022-07-27
Attending: Other
Payer: MEDICARE

## 2022-07-27 ENCOUNTER — HOSPITAL ENCOUNTER (OUTPATIENT)
Dept: LAB | Facility: HOSPITAL | Age: 72
Discharge: HOME | End: 2022-07-27
Attending: INTERNAL MEDICINE
Payer: MEDICARE

## 2022-07-27 DIAGNOSIS — D68.59 HYPERCOAGULABLE STATE (HCC): ICD-10-CM

## 2022-07-27 LAB — INR BLDC: 2.8 (ref 0.9–1.1)

## 2022-07-27 PROCEDURE — 85610 PROTHROMBIN TIME: CPT | Performed by: INTERNAL MEDICINE

## 2022-09-15 ENCOUNTER — HOSPITAL ENCOUNTER (OUTPATIENT)
Dept: GENERAL RADIOLOGY | Facility: HOSPITAL | Age: 72
Discharge: HOME | End: 2022-09-15
Attending: INTERNAL MEDICINE
Payer: MEDICARE

## 2022-09-15 DIAGNOSIS — D68.59 HYPERCOAGULABLE STATE (HCC): ICD-10-CM

## 2022-09-15 DIAGNOSIS — I70.0 AORTO-ILIAC ATHEROSCLEROSIS (HCC): ICD-10-CM

## 2022-09-15 DIAGNOSIS — D84.9 IMMUNOSUPPRESSED STATUS (HCC): ICD-10-CM

## 2022-09-15 DIAGNOSIS — Z85.3 HISTORY OF BREAST CANCER: ICD-10-CM

## 2022-09-15 DIAGNOSIS — M79.0 RHEUMATISM AND FIBROSITIS: Primary | ICD-10-CM

## 2022-09-15 DIAGNOSIS — R25.1 TREMOR: ICD-10-CM

## 2022-09-15 DIAGNOSIS — G20 PARKINSONISM, UNSPECIFIED PARKINSONISM TYPE (HCC): ICD-10-CM

## 2022-09-15 DIAGNOSIS — R51.9 CHRONIC DAILY HEADACHE: ICD-10-CM

## 2022-09-15 DIAGNOSIS — D64.9 SYMPTOMATIC ANEMIA: ICD-10-CM

## 2022-09-15 DIAGNOSIS — M79.7 RHEUMATISM AND FIBROSITIS: Primary | ICD-10-CM

## 2022-09-15 DIAGNOSIS — E55.9 VITAMIN D DEFICIENCY: ICD-10-CM

## 2022-09-15 DIAGNOSIS — H35.9: ICD-10-CM

## 2022-09-15 DIAGNOSIS — F51.04 PSYCHOPHYSIOLOGICAL INSOMNIA: ICD-10-CM

## 2022-09-15 DIAGNOSIS — D57.3 SICKLE CELL TRAIT (HCC): ICD-10-CM

## 2022-09-15 DIAGNOSIS — M32.9 SYSTEMIC LUPUS ERYTHEMATOSUS, UNSPECIFIED SLE TYPE, UNSPECIFIED ORGAN INVOLVEMENT STATUS (HCC): ICD-10-CM

## 2022-09-15 DIAGNOSIS — G56.22 ULNAR NEUROPATHY AT ELBOW OF LEFT UPPER EXTREMITY: ICD-10-CM

## 2022-09-15 DIAGNOSIS — M79.7 FIBROMYALGIA: ICD-10-CM

## 2022-09-15 DIAGNOSIS — I70.8 AORTO-ILIAC ATHEROSCLEROSIS (HCC): ICD-10-CM

## 2022-09-15 DIAGNOSIS — M32.9 LUPUS ARTHRITIS (HCC): ICD-10-CM

## 2022-09-15 DIAGNOSIS — R73.01 IMPAIRED FASTING GLUCOSE: ICD-10-CM

## 2022-09-15 DIAGNOSIS — J45.41 MODERATE PERSISTENT ASTHMA WITH EXACERBATION: ICD-10-CM

## 2022-09-15 DIAGNOSIS — Z79.01 LONG TERM CURRENT USE OF ANTICOAGULANT THERAPY: ICD-10-CM

## 2022-09-15 DIAGNOSIS — I10 BENIGN ESSENTIAL HTN: ICD-10-CM

## 2022-09-15 LAB
ALBUMIN SERPL-MCNC: 3.6 G/DL (ref 3.4–5)
ALP LIVER SERPL-CCNC: 68 U/L
ALT SERPL-CCNC: 21 U/L
AST SERPL-CCNC: 13 U/L (ref 15–37)
BASOPHILS # BLD AUTO: 0.03 X10(3) UL (ref 0–0.2)
BASOPHILS NFR BLD AUTO: 0.4 %
BILIRUB DIRECT SERPL-MCNC: 0.1 MG/DL (ref 0–0.2)
BILIRUB SERPL-MCNC: 0.2 MG/DL (ref 0.1–2)
BILIRUB UR QL STRIP.AUTO: NEGATIVE
BUN BLD-MCNC: 11 MG/DL (ref 7–18)
C3 SERPL-MCNC: 151 MG/DL (ref 90–180)
C4 SERPL-MCNC: 45.7 MG/DL (ref 10–40)
CLARITY UR REFRACT.AUTO: CLEAR
COLOR UR AUTO: YELLOW
CREAT BLD-MCNC: 0.86 MG/DL
CREAT UR-SCNC: 61.8 MG/DL
CRP SERPL-MCNC: 1.66 MG/DL (ref ?–0.3)
EOSINOPHIL # BLD AUTO: 0.11 X10(3) UL (ref 0–0.7)
EOSINOPHIL NFR BLD AUTO: 1.4 %
ERYTHROCYTE [DISTWIDTH] IN BLOOD BY AUTOMATED COUNT: 14.4 %
ERYTHROCYTE [SEDIMENTATION RATE] IN BLOOD: 57 MM/HR
GFR SERPLBLD BASED ON 1.73 SQ M-ARVRAT: 73 ML/MIN/1.73M2 (ref 60–?)
GLUCOSE UR STRIP.AUTO-MCNC: NEGATIVE MG/DL
HCT VFR BLD AUTO: 32.2 %
HGB BLD-MCNC: 10.4 G/DL
IMM GRANULOCYTES # BLD AUTO: 0.02 X10(3) UL (ref 0–1)
IMM GRANULOCYTES NFR BLD: 0.3 %
KETONES UR STRIP.AUTO-MCNC: NEGATIVE MG/DL
LEUKOCYTE ESTERASE UR QL STRIP.AUTO: NEGATIVE
LYMPHOCYTES # BLD AUTO: 2.06 X10(3) UL (ref 1–4)
LYMPHOCYTES NFR BLD AUTO: 26.1 %
MCH RBC QN AUTO: 26.3 PG (ref 26–34)
MCHC RBC AUTO-ENTMCNC: 32.3 G/DL (ref 31–37)
MCV RBC AUTO: 81.5 FL
MONOCYTES # BLD AUTO: 0.71 X10(3) UL (ref 0.1–1)
MONOCYTES NFR BLD AUTO: 9 %
NEUTROPHILS # BLD AUTO: 4.97 X10 (3) UL (ref 1.5–7.7)
NEUTROPHILS # BLD AUTO: 4.97 X10(3) UL (ref 1.5–7.7)
NEUTROPHILS NFR BLD AUTO: 62.8 %
NITRITE UR QL STRIP.AUTO: NEGATIVE
PH UR STRIP.AUTO: 7 [PH] (ref 5–8)
PLATELET # BLD AUTO: 202 10(3)UL (ref 150–450)
PROT SERPL-MCNC: 6.8 G/DL (ref 6.4–8.2)
PROT UR STRIP.AUTO-MCNC: NEGATIVE MG/DL
PROT UR-MCNC: 12.5 MG/DL
RBC # BLD AUTO: 3.95 X10(6)UL
RBC UR QL AUTO: NEGATIVE
SP GR UR STRIP.AUTO: 1.01 (ref 1–1.03)
UROBILINOGEN UR STRIP.AUTO-MCNC: 0.2 MG/DL
WBC # BLD AUTO: 7.9 X10(3) UL (ref 4–11)

## 2022-09-15 PROCEDURE — 84156 ASSAY OF PROTEIN URINE: CPT | Performed by: INTERNAL MEDICINE

## 2022-09-15 PROCEDURE — 86160 COMPLEMENT ANTIGEN: CPT | Performed by: INTERNAL MEDICINE

## 2022-09-15 PROCEDURE — 82565 ASSAY OF CREATININE: CPT | Performed by: INTERNAL MEDICINE

## 2022-09-15 PROCEDURE — 86256 FLUORESCENT ANTIBODY TITER: CPT | Performed by: INTERNAL MEDICINE

## 2022-09-15 PROCEDURE — 82570 ASSAY OF URINE CREATININE: CPT | Performed by: INTERNAL MEDICINE

## 2022-09-15 PROCEDURE — 85652 RBC SED RATE AUTOMATED: CPT | Performed by: INTERNAL MEDICINE

## 2022-09-15 PROCEDURE — 84520 ASSAY OF UREA NITROGEN: CPT | Performed by: INTERNAL MEDICINE

## 2022-09-15 PROCEDURE — 86140 C-REACTIVE PROTEIN: CPT | Performed by: INTERNAL MEDICINE

## 2022-09-15 PROCEDURE — 85025 COMPLETE CBC W/AUTO DIFF WBC: CPT | Performed by: INTERNAL MEDICINE

## 2022-09-15 PROCEDURE — 81003 URINALYSIS AUTO W/O SCOPE: CPT | Performed by: INTERNAL MEDICINE

## 2022-09-15 PROCEDURE — 36591 DRAW BLOOD OFF VENOUS DEVICE: CPT

## 2022-09-15 PROCEDURE — 80076 HEPATIC FUNCTION PANEL: CPT | Performed by: INTERNAL MEDICINE

## 2022-11-03 ENCOUNTER — APPOINTMENT (OUTPATIENT)
Dept: GENERAL RADIOLOGY | Facility: HOSPITAL | Age: 72
End: 2022-11-03
Attending: EMERGENCY MEDICINE
Payer: MEDICARE

## 2022-11-03 ENCOUNTER — HOSPITAL ENCOUNTER (INPATIENT)
Facility: HOSPITAL | Age: 72
LOS: 3 days | Discharge: HOME HEALTH | End: 2022-11-09
Attending: EMERGENCY MEDICINE | Admitting: HOSPITALIST
Payer: MEDICARE

## 2022-11-03 DIAGNOSIS — J98.01 ACUTE BRONCHOSPASM: ICD-10-CM

## 2022-11-03 DIAGNOSIS — J06.9 VIRAL UPPER RESPIRATORY TRACT INFECTION: Primary | ICD-10-CM

## 2022-11-03 LAB
ADENOVIRUS PCR:: NOT DETECTED
ALBUMIN SERPL-MCNC: 3.8 G/DL (ref 3.4–5)
ALBUMIN/GLOB SERPL: 1.1 {RATIO} (ref 1–2)
ALP LIVER SERPL-CCNC: 74 U/L
ALT SERPL-CCNC: 24 U/L
ANION GAP SERPL CALC-SCNC: 5 MMOL/L (ref 0–18)
AST SERPL-CCNC: 23 U/L (ref 15–37)
ATRIAL RATE: 61 BPM
B PARAPERT DNA SPEC QL NAA+PROBE: NOT DETECTED
B PERT DNA SPEC QL NAA+PROBE: NOT DETECTED
BASOPHILS # BLD AUTO: 0.03 X10(3) UL (ref 0–0.2)
BASOPHILS NFR BLD AUTO: 0.5 %
BILIRUB SERPL-MCNC: 0.4 MG/DL (ref 0.1–2)
BUN BLD-MCNC: 11 MG/DL (ref 7–18)
C PNEUM DNA SPEC QL NAA+PROBE: NOT DETECTED
CALCIUM BLD-MCNC: 8.9 MG/DL (ref 8.5–10.1)
CHLORIDE SERPL-SCNC: 114 MMOL/L (ref 98–112)
CO2 SERPL-SCNC: 24 MMOL/L (ref 21–32)
CORONAVIRUS 229E PCR:: NOT DETECTED
CORONAVIRUS HKU1 PCR:: NOT DETECTED
CORONAVIRUS NL63 PCR:: NOT DETECTED
CORONAVIRUS OC43 PCR:: NOT DETECTED
CREAT BLD-MCNC: 0.86 MG/DL
EOSINOPHIL # BLD AUTO: 0.05 X10(3) UL (ref 0–0.7)
EOSINOPHIL NFR BLD AUTO: 0.9 %
ERYTHROCYTE [DISTWIDTH] IN BLOOD BY AUTOMATED COUNT: 14.2 %
FLUAV H3 RNA SPEC QL NAA+PROBE: DETECTED
FLUBV RNA SPEC QL NAA+PROBE: NOT DETECTED
GFR SERPLBLD BASED ON 1.73 SQ M-ARVRAT: 73 ML/MIN/1.73M2 (ref 60–?)
GLOBULIN PLAS-MCNC: 3.6 G/DL (ref 2.8–4.4)
GLUCOSE BLD-MCNC: 98 MG/DL (ref 70–99)
HCT VFR BLD AUTO: 36.5 %
HGB BLD-MCNC: 12 G/DL
IMM GRANULOCYTES # BLD AUTO: 0.02 X10(3) UL (ref 0–1)
IMM GRANULOCYTES NFR BLD: 0.3 %
INR BLD: 2.11 (ref 0.85–1.16)
LACTATE SERPL-SCNC: 1.5 MMOL/L (ref 0.4–2)
LYMPHOCYTES # BLD AUTO: 2.56 X10(3) UL (ref 1–4)
LYMPHOCYTES NFR BLD AUTO: 44.4 %
MCH RBC QN AUTO: 26.5 PG (ref 26–34)
MCHC RBC AUTO-ENTMCNC: 32.9 G/DL (ref 31–37)
MCV RBC AUTO: 80.6 FL
METAPNEUMOVIRUS PCR:: NOT DETECTED
MONOCYTES # BLD AUTO: 0.46 X10(3) UL (ref 0.1–1)
MONOCYTES NFR BLD AUTO: 8 %
MYCOPLASMA PNEUMONIA PCR:: NOT DETECTED
NEUTROPHILS # BLD AUTO: 2.65 X10 (3) UL (ref 1.5–7.7)
NEUTROPHILS # BLD AUTO: 2.65 X10(3) UL (ref 1.5–7.7)
NEUTROPHILS NFR BLD AUTO: 45.9 %
NT-PROBNP SERPL-MCNC: 179 PG/ML (ref ?–125)
OSMOLALITY SERPL CALC.SUM OF ELEC: 295 MOSM/KG (ref 275–295)
P AXIS: 40 DEGREES
P-R INTERVAL: 146 MS
PARAINFLUENZA 1 PCR:: NOT DETECTED
PARAINFLUENZA 2 PCR:: NOT DETECTED
PARAINFLUENZA 3 PCR:: NOT DETECTED
PARAINFLUENZA 4 PCR:: NOT DETECTED
PLATELET # BLD AUTO: 192 10(3)UL (ref 150–450)
POTASSIUM SERPL-SCNC: 4.2 MMOL/L (ref 3.5–5.1)
PROCALCITONIN SERPL-MCNC: <0.05 NG/ML (ref ?–0.16)
PROT SERPL-MCNC: 7.4 G/DL (ref 6.4–8.2)
PROTHROMBIN TIME: 23.5 SECONDS (ref 11.6–14.8)
Q-T INTERVAL: 432 MS
QRS DURATION: 100 MS
QTC CALCULATION (BEZET): 434 MS
R AXIS: 1 DEGREES
RBC # BLD AUTO: 4.53 X10(6)UL
RHINOVIRUS/ENTERO PCR:: NOT DETECTED
RSV RNA SPEC QL NAA+PROBE: NOT DETECTED
SARS-COV-2 RNA NPH QL NAA+NON-PROBE: DETECTED
SARS-COV-2 RNA RESP QL NAA+PROBE: NOT DETECTED
SODIUM SERPL-SCNC: 143 MMOL/L (ref 136–145)
T AXIS: 9 DEGREES
TROPONIN I HIGH SENSITIVITY: 17 NG/L
VENTRICULAR RATE: 61 BPM
WBC # BLD AUTO: 5.8 X10(3) UL (ref 4–11)

## 2022-11-03 PROCEDURE — 0202U NFCT DS 22 TRGT SARS-COV-2: CPT | Performed by: INTERNAL MEDICINE

## 2022-11-03 PROCEDURE — 84484 ASSAY OF TROPONIN QUANT: CPT | Performed by: EMERGENCY MEDICINE

## 2022-11-03 PROCEDURE — 99285 EMERGENCY DEPT VISIT HI MDM: CPT

## 2022-11-03 PROCEDURE — 80053 COMPREHEN METABOLIC PANEL: CPT | Performed by: EMERGENCY MEDICINE

## 2022-11-03 PROCEDURE — 93005 ELECTROCARDIOGRAM TRACING: CPT

## 2022-11-03 PROCEDURE — 96374 THER/PROPH/DIAG INJ IV PUSH: CPT

## 2022-11-03 PROCEDURE — 85025 COMPLETE CBC W/AUTO DIFF WBC: CPT | Performed by: EMERGENCY MEDICINE

## 2022-11-03 PROCEDURE — 83605 ASSAY OF LACTIC ACID: CPT | Performed by: EMERGENCY MEDICINE

## 2022-11-03 PROCEDURE — 71045 X-RAY EXAM CHEST 1 VIEW: CPT | Performed by: EMERGENCY MEDICINE

## 2022-11-03 PROCEDURE — 93010 ELECTROCARDIOGRAM REPORT: CPT

## 2022-11-03 PROCEDURE — 84145 PROCALCITONIN (PCT): CPT | Performed by: EMERGENCY MEDICINE

## 2022-11-03 PROCEDURE — 85610 PROTHROMBIN TIME: CPT | Performed by: HOSPITALIST

## 2022-11-03 PROCEDURE — 94644 CONT INHLJ TX 1ST HOUR: CPT

## 2022-11-03 PROCEDURE — 83880 ASSAY OF NATRIURETIC PEPTIDE: CPT | Performed by: EMERGENCY MEDICINE

## 2022-11-03 RX ORDER — DOCUSATE SODIUM 100 MG/1
100 CAPSULE, LIQUID FILLED ORAL 2 TIMES DAILY
Status: DISCONTINUED | OUTPATIENT
Start: 2022-11-03 | End: 2022-11-09

## 2022-11-03 RX ORDER — CARVEDILOL 12.5 MG/1
12.5 TABLET ORAL 2 TIMES DAILY WITH MEALS
Status: DISCONTINUED | OUTPATIENT
Start: 2022-11-03 | End: 2022-11-09

## 2022-11-03 RX ORDER — MELATONIN
400 DAILY
Status: DISCONTINUED | OUTPATIENT
Start: 2022-11-04 | End: 2022-11-09

## 2022-11-03 RX ORDER — WARFARIN SODIUM 5 MG/1
TABLET ORAL
Status: DISPENSED
Start: 2022-11-03 | End: 2022-11-04

## 2022-11-03 RX ORDER — OXYBUTYNIN CHLORIDE 10 MG/1
10 TABLET, EXTENDED RELEASE ORAL DAILY
Status: DISCONTINUED | OUTPATIENT
Start: 2022-11-04 | End: 2022-11-09

## 2022-11-03 RX ORDER — OSELTAMIVIR PHOSPHATE 30 MG/1
30 CAPSULE ORAL EVERY 12 HOURS SCHEDULED
Status: COMPLETED | OUTPATIENT
Start: 2022-11-03 | End: 2022-11-07

## 2022-11-03 RX ORDER — METHYLPREDNISOLONE SODIUM SUCCINATE 125 MG/2ML
125 INJECTION, POWDER, LYOPHILIZED, FOR SOLUTION INTRAMUSCULAR; INTRAVENOUS ONCE
Status: COMPLETED | OUTPATIENT
Start: 2022-11-03 | End: 2022-11-03

## 2022-11-03 RX ORDER — BACLOFEN 10 MG/1
10 TABLET ORAL 2 TIMES DAILY PRN
Status: DISCONTINUED | OUTPATIENT
Start: 2022-11-03 | End: 2022-11-03

## 2022-11-03 RX ORDER — ACETAMINOPHEN 500 MG
500 TABLET ORAL EVERY 6 HOURS PRN
Status: DISCONTINUED | OUTPATIENT
Start: 2022-11-03 | End: 2022-11-09

## 2022-11-03 RX ORDER — IPRATROPIUM BROMIDE AND ALBUTEROL SULFATE 2.5; .5 MG/3ML; MG/3ML
3 SOLUTION RESPIRATORY (INHALATION) EVERY 4 HOURS PRN
Status: DISCONTINUED | OUTPATIENT
Start: 2022-11-03 | End: 2022-11-05

## 2022-11-03 RX ORDER — POLYETHYLENE GLYCOL 3350 17 G/17G
17 POWDER, FOR SOLUTION ORAL DAILY PRN
Status: DISCONTINUED | OUTPATIENT
Start: 2022-11-03 | End: 2022-11-09

## 2022-11-03 RX ORDER — WARFARIN SODIUM 5 MG/1
5 TABLET ORAL
Status: COMPLETED | OUTPATIENT
Start: 2022-11-03 | End: 2022-11-03

## 2022-11-03 RX ORDER — HYDRALAZINE HYDROCHLORIDE 50 MG/1
100 TABLET, FILM COATED ORAL 3 TIMES DAILY
Status: DISCONTINUED | OUTPATIENT
Start: 2022-11-03 | End: 2022-11-09

## 2022-11-03 RX ORDER — GUAIFENESIN 600 MG/1
600 TABLET, EXTENDED RELEASE ORAL 2 TIMES DAILY
Status: DISCONTINUED | OUTPATIENT
Start: 2022-11-03 | End: 2022-11-09

## 2022-11-03 RX ORDER — LOSARTAN POTASSIUM 50 MG/1
50 TABLET ORAL 2 TIMES DAILY
Status: DISCONTINUED | OUTPATIENT
Start: 2022-11-03 | End: 2022-11-09

## 2022-11-03 RX ORDER — FAMOTIDINE 20 MG/1
40 TABLET, FILM COATED ORAL
Status: DISCONTINUED | OUTPATIENT
Start: 2022-11-03 | End: 2022-11-09

## 2022-11-03 RX ORDER — CARVEDILOL 12.5 MG/1
12.5 TABLET ORAL 2 TIMES DAILY WITH MEALS
COMMUNITY

## 2022-11-03 RX ORDER — ALBUTEROL SULFATE 90 UG/1
4 AEROSOL, METERED RESPIRATORY (INHALATION) EVERY 4 HOURS PRN
Status: DISCONTINUED | OUTPATIENT
Start: 2022-11-03 | End: 2022-11-09

## 2022-11-03 RX ORDER — BUDESONIDE 0.25 MG/2ML
0.25 INHALANT ORAL
Status: DISCONTINUED | OUTPATIENT
Start: 2022-11-04 | End: 2022-11-03

## 2022-11-03 RX ORDER — MYCOPHENOLATE MOFETIL 250 MG/1
1500 CAPSULE ORAL 2 TIMES DAILY
Status: DISCONTINUED | OUTPATIENT
Start: 2022-11-03 | End: 2022-11-09

## 2022-11-03 RX ORDER — METHYLPREDNISOLONE SODIUM SUCCINATE 125 MG/2ML
80 INJECTION, POWDER, LYOPHILIZED, FOR SOLUTION INTRAMUSCULAR; INTRAVENOUS EVERY 8 HOURS
Status: COMPLETED | OUTPATIENT
Start: 2022-11-03 | End: 2022-11-06

## 2022-11-03 RX ORDER — PANTOPRAZOLE SODIUM 40 MG/1
40 TABLET, DELAYED RELEASE ORAL
Status: DISCONTINUED | OUTPATIENT
Start: 2022-11-04 | End: 2022-11-09

## 2022-11-03 NOTE — ED QUICK NOTES
Assumed care of patient, attempted to get blood cultures, unable. Per staff, lab notified to gather blood cultures.

## 2022-11-03 NOTE — ED INITIAL ASSESSMENT (HPI)
Pt to the emergency room for sob for the past week. Pt states that she was dx with the flu last Wednesday. Pt states she had an abnormal nuclear stress test, pt unable to verify what was abnormal. Pt reports that she came in today due to no improvement in symptoms. Pt reports that they went to urgent care who sent her here due to low BP 98/70. No interventions done at urgent care per pt. Pt BP in triage 146/84, 69HR, and  100% RA.
No

## 2022-11-03 NOTE — ED QUICK NOTES
Orders for admission, patient is aware of plan and ready to go upstairs. Any questions, please call ED RN Romie Cr at extension [phone].      Patient Covid vaccination status: Fully vaccinated     COVID Test Ordered in ED: Rapid SARS-CoV-2 by PCR    COVID Suspicion at Admission: Low clinical suspicion for COVID    Running Infusions:  None    Mental Status/LOC at time of transport: A&O x 4     Other pertinent information: Paged lab to draw blood cultures x 2   CIWA score: N/A   NIH score:  N/A

## 2022-11-04 LAB
ALBUMIN SERPL-MCNC: 3.4 G/DL (ref 3.4–5)
ALBUMIN/GLOB SERPL: 0.9 {RATIO} (ref 1–2)
ALP LIVER SERPL-CCNC: 59 U/L
ALT SERPL-CCNC: 24 U/L
ANION GAP SERPL CALC-SCNC: 8 MMOL/L (ref 0–18)
AST SERPL-CCNC: 26 U/L (ref 15–37)
BASOPHILS # BLD AUTO: 0 X10(3) UL (ref 0–0.2)
BASOPHILS NFR BLD AUTO: 0 %
BILIRUB SERPL-MCNC: 0.5 MG/DL (ref 0.1–2)
BUN BLD-MCNC: 15 MG/DL (ref 7–18)
CALCIUM BLD-MCNC: 9 MG/DL (ref 8.5–10.1)
CHLORIDE SERPL-SCNC: 111 MMOL/L (ref 98–112)
CO2 SERPL-SCNC: 22 MMOL/L (ref 21–32)
CREAT BLD-MCNC: 0.8 MG/DL
CRP SERPL-MCNC: 1.01 MG/DL (ref ?–0.3)
D DIMER PPP FEU-MCNC: <0.27 UG/ML FEU (ref ?–0.7)
DEPRECATED HBV CORE AB SER IA-ACNC: 746.3 NG/ML
EOSINOPHIL # BLD AUTO: 0 X10(3) UL (ref 0–0.7)
EOSINOPHIL NFR BLD AUTO: 0 %
ERYTHROCYTE [DISTWIDTH] IN BLOOD BY AUTOMATED COUNT: 14.2 %
GFR SERPLBLD BASED ON 1.73 SQ M-ARVRAT: 79 ML/MIN/1.73M2 (ref 60–?)
GLOBULIN PLAS-MCNC: 3.6 G/DL (ref 2.8–4.4)
GLUCOSE BLD-MCNC: 137 MG/DL (ref 70–99)
HCT VFR BLD AUTO: 33.9 %
HGB BLD-MCNC: 11.4 G/DL
IMM GRANULOCYTES # BLD AUTO: 0.02 X10(3) UL (ref 0–1)
IMM GRANULOCYTES NFR BLD: 0.4 %
INR BLD: 2.18 (ref 0.85–1.16)
LDH SERPL L TO P-CCNC: 279 U/L
LYMPHOCYTES # BLD AUTO: 1.27 X10(3) UL (ref 1–4)
LYMPHOCYTES NFR BLD AUTO: 27.7 %
MCH RBC QN AUTO: 27.3 PG (ref 26–34)
MCHC RBC AUTO-ENTMCNC: 33.6 G/DL (ref 31–37)
MCV RBC AUTO: 81.1 FL
MONOCYTES # BLD AUTO: 0.09 X10(3) UL (ref 0.1–1)
MONOCYTES NFR BLD AUTO: 2 %
NEUTROPHILS # BLD AUTO: 3.21 X10 (3) UL (ref 1.5–7.7)
NEUTROPHILS # BLD AUTO: 3.21 X10(3) UL (ref 1.5–7.7)
NEUTROPHILS NFR BLD AUTO: 69.9 %
OSMOLALITY SERPL CALC.SUM OF ELEC: 295 MOSM/KG (ref 275–295)
PLATELET # BLD AUTO: 191 10(3)UL (ref 150–450)
POTASSIUM SERPL-SCNC: 4.1 MMOL/L (ref 3.5–5.1)
PROT SERPL-MCNC: 7 G/DL (ref 6.4–8.2)
PROTHROMBIN TIME: 24 SECONDS (ref 11.6–14.8)
RBC # BLD AUTO: 4.18 X10(6)UL
SODIUM SERPL-SCNC: 141 MMOL/L (ref 136–145)
WBC # BLD AUTO: 4.6 X10(3) UL (ref 4–11)

## 2022-11-04 PROCEDURE — 86140 C-REACTIVE PROTEIN: CPT | Performed by: HOSPITALIST

## 2022-11-04 PROCEDURE — 85025 COMPLETE CBC W/AUTO DIFF WBC: CPT | Performed by: HOSPITALIST

## 2022-11-04 PROCEDURE — 80053 COMPREHEN METABOLIC PANEL: CPT | Performed by: HOSPITALIST

## 2022-11-04 PROCEDURE — 82728 ASSAY OF FERRITIN: CPT | Performed by: HOSPITALIST

## 2022-11-04 PROCEDURE — 85610 PROTHROMBIN TIME: CPT | Performed by: HOSPITALIST

## 2022-11-04 PROCEDURE — 85379 FIBRIN DEGRADATION QUANT: CPT | Performed by: HOSPITALIST

## 2022-11-04 PROCEDURE — 83615 LACTATE (LD) (LDH) ENZYME: CPT | Performed by: HOSPITALIST

## 2022-11-04 PROCEDURE — XW033E5 INTRODUCTION OF REMDESIVIR ANTI-INFECTIVE INTO PERIPHERAL VEIN, PERCUTANEOUS APPROACH, NEW TECHNOLOGY GROUP 5: ICD-10-PCS | Performed by: INTERNAL MEDICINE

## 2022-11-04 RX ORDER — CALCIUM CARBONATE 200(500)MG
1000 TABLET,CHEWABLE ORAL EVERY 6 HOURS PRN
Status: DISCONTINUED | OUTPATIENT
Start: 2022-11-04 | End: 2022-11-09

## 2022-11-04 RX ORDER — MAGNESIUM HYDROXIDE/ALUMINUM HYDROXICE/SIMETHICONE 120; 1200; 1200 MG/30ML; MG/30ML; MG/30ML
30 SUSPENSION ORAL 4 TIMES DAILY PRN
Status: DISCONTINUED | OUTPATIENT
Start: 2022-11-04 | End: 2022-11-09

## 2022-11-04 NOTE — CONSULTS
[facility] Dosing Service  Warfarin (Coumadin) Subsequent Dosing    Ronn Castaneda is a 81year old patient for whom pharmacy is dosing warfarin (Coumadin). Goal INR is 2-3    Recent Labs   Lab 11/03/22  1729 11/04/22  0647   INR 2.11* 2.18*       Consulted by:  Dr Marcell Carlos  Indication:   for  hx of hypercoagulable state    Potential Drug Interactions:  Vit K - on small daily dose PTA  Other Anticoagulants:  None  Home regimen (if applicable): Warfarin 7 mg on Monday and Friday, 5 mg ROW     Inpatient Dosing History:    Date 11/3 11/4       INR 2.11 2.18       Coumadin dose 5 mg                 Based on above -  1. For today, Give warfarin (COUMADIN) 7 mg at 2100 tonight  2   PT/INR ordered daily while on warfarin  3. Pharmacy will continue to follow. We appreciate the opportunity to assist in the care of this patient.     Gardenia Sandy, PharmD  11/4/2022  4:09 PM

## 2022-11-04 NOTE — PLAN OF CARE
Received pt alert and oriented x4. VSS. Afebrile. Pt c/o chronic pain but denied any pain meds. Pt has productive cough. Meds given per MAR orders. Respiratory panel results positive for influenza and covid-19. Writer unsure if MD aware of respiratory panel results. Dr. Thomas Navas paged and ordered to notify pulmonary. Dr. Cornell White notified & aware. ID paged.

## 2022-11-04 NOTE — PROGRESS NOTES
[address] [facility] Dosing Service  Warfarin (Coumadin) Initial Dosing    Hallie Flores is a 81year old patient for whom pharmacy has been consulted to dose warfarin (COUMADIN) for  hx of hypercoagulable state  by Dr. Isauro Dash. Based on this indication, goal INR is 2-3. Pertinent Patient Medical History:  hypercoagulable state  Potential Drug Interactions:  Vitamin K - on small daily dose PTA    Latest INR:   Recent Labs     11/03/22  1729   INR 2.11*       Other Anticoagulants:  None  Home regimen (if applicable): Warfarin 7 mg on Monday and Friday, 5 mg ROW   Date/Time last dose was given (if applicable): 5 mg on 07/1/62    Based on above -  1. For today, Give warfarin (COUMADIN) 5 mg at 2100 tonight    2. PT/INR ordered daily while on warfarin    3. Pharmacy will continue to follow. We appreciate the opportunity to assist in the care of this patient.     Marcial Hanna, IngridD  11/3/2022  8:38 PM

## 2022-11-04 NOTE — PROGRESS NOTES
Patient brought up from ED. Transferred from cart to bed with assistance. Admission navigator completed. Patient alert and oriented x4, complaints of constant pain unrelieved by interventions, visual impairment (\"legally blind\") and SOB. Connected to continuous pulse oximeter at 99% on room air . Patient oriented to call light and able to complete a return demonstration. Patient belongings include phone, bracelet, earrings, cane, glasses, and R breast prosthetic. Call light within reach. RN called respiratory at approximately 1845 to complete PRN breathing treatment. RT stated they will follow up.

## 2022-11-05 LAB
ALBUMIN SERPL-MCNC: 3.2 G/DL (ref 3.4–5)
ALBUMIN/GLOB SERPL: 0.8 {RATIO} (ref 1–2)
ALP LIVER SERPL-CCNC: 60 U/L
ALT SERPL-CCNC: 35 U/L
ANION GAP SERPL CALC-SCNC: 7 MMOL/L (ref 0–18)
AST SERPL-CCNC: 19 U/L (ref 15–37)
BASOPHILS # BLD AUTO: 0.01 X10(3) UL (ref 0–0.2)
BASOPHILS NFR BLD AUTO: 0.1 %
BILIRUB SERPL-MCNC: 0.2 MG/DL (ref 0.1–2)
BUN BLD-MCNC: 24 MG/DL (ref 7–18)
CALCIUM BLD-MCNC: 9.8 MG/DL (ref 8.5–10.1)
CHLORIDE SERPL-SCNC: 113 MMOL/L (ref 98–112)
CO2 SERPL-SCNC: 21 MMOL/L (ref 21–32)
CREAT BLD-MCNC: 0.9 MG/DL
CRP SERPL-MCNC: 0.6 MG/DL (ref ?–0.3)
D DIMER PPP FEU-MCNC: 0.33 UG/ML FEU (ref ?–0.7)
DEPRECATED HBV CORE AB SER IA-ACNC: 624.4 NG/ML
EOSINOPHIL # BLD AUTO: 0 X10(3) UL (ref 0–0.7)
EOSINOPHIL NFR BLD AUTO: 0 %
ERYTHROCYTE [DISTWIDTH] IN BLOOD BY AUTOMATED COUNT: 14.1 %
GFR SERPLBLD BASED ON 1.73 SQ M-ARVRAT: 69 ML/MIN/1.73M2 (ref 60–?)
GLOBULIN PLAS-MCNC: 4.1 G/DL (ref 2.8–4.4)
GLUCOSE BLD-MCNC: 141 MG/DL (ref 70–99)
HCT VFR BLD AUTO: 34.3 %
HGB BLD-MCNC: 11.5 G/DL
IMM GRANULOCYTES # BLD AUTO: 0.08 X10(3) UL (ref 0–1)
IMM GRANULOCYTES NFR BLD: 0.7 %
INR BLD: 2.93 (ref 0.85–1.16)
LDH SERPL L TO P-CCNC: 296 U/L
LYMPHOCYTES # BLD AUTO: 1.31 X10(3) UL (ref 1–4)
LYMPHOCYTES NFR BLD AUTO: 11.3 %
MCH RBC QN AUTO: 26.5 PG (ref 26–34)
MCHC RBC AUTO-ENTMCNC: 33.5 G/DL (ref 31–37)
MCV RBC AUTO: 79 FL
MONOCYTES # BLD AUTO: 0.24 X10(3) UL (ref 0.1–1)
MONOCYTES NFR BLD AUTO: 2.1 %
NEUTROPHILS # BLD AUTO: 9.98 X10 (3) UL (ref 1.5–7.7)
NEUTROPHILS # BLD AUTO: 9.98 X10(3) UL (ref 1.5–7.7)
NEUTROPHILS NFR BLD AUTO: 85.8 %
OSMOLALITY SERPL CALC.SUM OF ELEC: 298 MOSM/KG (ref 275–295)
PLATELET # BLD AUTO: 224 10(3)UL (ref 150–450)
POTASSIUM SERPL-SCNC: 4.4 MMOL/L (ref 3.5–5.1)
PROT SERPL-MCNC: 7.3 G/DL (ref 6.4–8.2)
PROTHROMBIN TIME: 30.2 SECONDS (ref 11.6–14.8)
RBC # BLD AUTO: 4.34 X10(6)UL
SODIUM SERPL-SCNC: 141 MMOL/L (ref 136–145)
WBC # BLD AUTO: 11.6 X10(3) UL (ref 4–11)

## 2022-11-05 PROCEDURE — 83615 LACTATE (LD) (LDH) ENZYME: CPT | Performed by: HOSPITALIST

## 2022-11-05 PROCEDURE — 85379 FIBRIN DEGRADATION QUANT: CPT | Performed by: HOSPITALIST

## 2022-11-05 PROCEDURE — 86140 C-REACTIVE PROTEIN: CPT | Performed by: HOSPITALIST

## 2022-11-05 PROCEDURE — 85025 COMPLETE CBC W/AUTO DIFF WBC: CPT | Performed by: HOSPITALIST

## 2022-11-05 PROCEDURE — 85610 PROTHROMBIN TIME: CPT | Performed by: HOSPITALIST

## 2022-11-05 PROCEDURE — 80053 COMPREHEN METABOLIC PANEL: CPT | Performed by: HOSPITALIST

## 2022-11-05 PROCEDURE — 82728 ASSAY OF FERRITIN: CPT | Performed by: HOSPITALIST

## 2022-11-05 NOTE — PROGRESS NOTES
[facility] Dosing Service  Warfarin (Coumadin) Subsequent Dosing    Little Wade is a 81year old patient for whom pharmacy is dosing warfarin (Coumadin). Goal INR is 2-3    Recent Labs   Lab 11/03/22  1729 11/04/22  0647 11/05/22  0712   INR 2.11* 2.18* 2.93*     Consulted by:  Dr Davey Borja  Indication:   for  hx of hypercoagulable state    Potential Drug Interactions:  Vit K - on small daily dose PTA, Remdesivir (May increase INR), Tamiflu (may increase INR), Solumedrol (may increase INR)  Other Anticoagulants:  None  Home regimen (if applicable): Warfarin 7 mg on Monday and Friday, 5 mg ROW      Inpatient Dosing History:   Date 11/3 11/4 11/5    INR 2.11 2.18  2.93   Coumadin dose 5 mg 7 mg                                                                                 Based on above -  1. For today, Give warfarin (COUMADIN) 3 mg at 2100 tonight  2   PT/INR ordered daily while on warfarin  3. Pharmacy will continue to follow. We appreciate the opportunity to assist in the care of this patient.     Demi Nguyen, PharmD  11/5/2022  8:56 AM

## 2022-11-05 NOTE — PROGRESS NOTES
Patient is alert and oriented x4. Medications given per MAR. Patient started on remdesivir. Paged MD about adding prn meds for acid reflux. Patient family member brought home med. Tagged by pharmacy. Isolation maintained. Safety precautions in place. Call light within reach.

## 2022-11-05 NOTE — PROGRESS NOTES
Patient is alert and oriented x4, continue to have cough, and c/o indigestion/ acid reflux; Maalox and tums given with relief. All meds due administered. VSS and afebrile; Noted to be SOB on mod exertion, kept on room air with stable O2 sat. Contact and droplet prec observed. Fall prec maintained. Needs addressed. Will continue to monitor.

## 2022-11-06 LAB
ALBUMIN SERPL-MCNC: 3.1 G/DL (ref 3.4–5)
ALBUMIN/GLOB SERPL: 0.9 {RATIO} (ref 1–2)
ALP LIVER SERPL-CCNC: 59 U/L
ALT SERPL-CCNC: 23 U/L
ANION GAP SERPL CALC-SCNC: 4 MMOL/L (ref 0–18)
AST SERPL-CCNC: 13 U/L (ref 15–37)
BASOPHILS # BLD AUTO: 0.01 X10(3) UL (ref 0–0.2)
BASOPHILS NFR BLD AUTO: 0.1 %
BILIRUB SERPL-MCNC: 0.2 MG/DL (ref 0.1–2)
BUN BLD-MCNC: 21 MG/DL (ref 7–18)
CALCIUM BLD-MCNC: 8.9 MG/DL (ref 8.5–10.1)
CHLORIDE SERPL-SCNC: 111 MMOL/L (ref 98–112)
CO2 SERPL-SCNC: 26 MMOL/L (ref 21–32)
CREAT BLD-MCNC: 0.89 MG/DL
CRP SERPL-MCNC: <0.29 MG/DL (ref ?–0.3)
D DIMER PPP FEU-MCNC: <0.27 UG/ML FEU (ref ?–0.7)
DEPRECATED HBV CORE AB SER IA-ACNC: 614.4 NG/ML
EOSINOPHIL # BLD AUTO: 0 X10(3) UL (ref 0–0.7)
EOSINOPHIL NFR BLD AUTO: 0 %
ERYTHROCYTE [DISTWIDTH] IN BLOOD BY AUTOMATED COUNT: 14.4 %
GFR SERPLBLD BASED ON 1.73 SQ M-ARVRAT: 70 ML/MIN/1.73M2 (ref 60–?)
GLOBULIN PLAS-MCNC: 3.6 G/DL (ref 2.8–4.4)
GLUCOSE BLD-MCNC: 138 MG/DL (ref 70–99)
HCT VFR BLD AUTO: 33.1 %
HGB BLD-MCNC: 11.1 G/DL
IMM GRANULOCYTES # BLD AUTO: 0.29 X10(3) UL (ref 0–1)
IMM GRANULOCYTES NFR BLD: 2.1 %
INR BLD: 2.92 (ref 0.85–1.16)
LDH SERPL L TO P-CCNC: 215 U/L
LYMPHOCYTES # BLD AUTO: 1.58 X10(3) UL (ref 1–4)
LYMPHOCYTES NFR BLD AUTO: 11.6 %
MCH RBC QN AUTO: 27 PG (ref 26–34)
MCHC RBC AUTO-ENTMCNC: 33.5 G/DL (ref 31–37)
MCV RBC AUTO: 80.5 FL
MONOCYTES # BLD AUTO: 0.56 X10(3) UL (ref 0.1–1)
MONOCYTES NFR BLD AUTO: 4.1 %
NEUTROPHILS # BLD AUTO: 11.19 X10 (3) UL (ref 1.5–7.7)
NEUTROPHILS # BLD AUTO: 11.19 X10(3) UL (ref 1.5–7.7)
NEUTROPHILS NFR BLD AUTO: 82.1 %
OSMOLALITY SERPL CALC.SUM OF ELEC: 297 MOSM/KG (ref 275–295)
PLATELET # BLD AUTO: 247 10(3)UL (ref 150–450)
POTASSIUM SERPL-SCNC: 3.9 MMOL/L (ref 3.5–5.1)
PROT SERPL-MCNC: 6.7 G/DL (ref 6.4–8.2)
PROTHROMBIN TIME: 30.1 SECONDS (ref 11.6–14.8)
RBC # BLD AUTO: 4.11 X10(6)UL
SODIUM SERPL-SCNC: 141 MMOL/L (ref 136–145)
WBC # BLD AUTO: 13.6 X10(3) UL (ref 4–11)

## 2022-11-06 PROCEDURE — 85379 FIBRIN DEGRADATION QUANT: CPT | Performed by: HOSPITALIST

## 2022-11-06 PROCEDURE — 85025 COMPLETE CBC W/AUTO DIFF WBC: CPT | Performed by: HOSPITALIST

## 2022-11-06 PROCEDURE — 83615 LACTATE (LD) (LDH) ENZYME: CPT | Performed by: HOSPITALIST

## 2022-11-06 PROCEDURE — 86140 C-REACTIVE PROTEIN: CPT | Performed by: HOSPITALIST

## 2022-11-06 PROCEDURE — 82728 ASSAY OF FERRITIN: CPT | Performed by: HOSPITALIST

## 2022-11-06 PROCEDURE — 80053 COMPREHEN METABOLIC PANEL: CPT | Performed by: HOSPITALIST

## 2022-11-06 PROCEDURE — 85610 PROTHROMBIN TIME: CPT | Performed by: HOSPITALIST

## 2022-11-06 RX ORDER — PREDNISONE 20 MG/1
40 TABLET ORAL
Status: DISCONTINUED | OUTPATIENT
Start: 2022-11-07 | End: 2022-11-09

## 2022-11-06 NOTE — PROGRESS NOTES
Patient A&O x4. VSS. Afebrile. Patient experiencing inspiratory wheezing and dyspnea on exertion when getting up to bedside commode. Albuterol used emergently during shift. Oxygen applied to decrease work of breathing and maintain optimal oxygen saturations. Patient complains of pain but denies any medication intervention, claiming that nothing helps. Patient complains of acid reflux, Maalox and Tums administered per orders with relief. Contact and droplet precautions observed. Safety precautions in place. Will continue to monitor.

## 2022-11-06 NOTE — PLAN OF CARE
Problem: Patient/Family Goals  Goal: Patient/Family Long Term Goal  Description: Patient's Long Term Goal: Be stable at baseline health    Interventions:  - Follow up with PCP as needed  -Yearly physicals. - See additional Care Plan goals for specific interventions  Outcome: Progressing  Goal: Patient/Family Short Term Goal  Description: Patient's Short Term Goal : Have cough improved and able to breath better with activity    Interventions:   - IV Steroids  -IV Remdesevir  -O2 PRN  - Encourage ambulation as tolerated  - See additional Care Plan goals for specific interventions  Outcome: Progressing     Problem: CARDIOVASCULAR - ADULT  Goal: Maintains optimal cardiac output and hemodynamic stability  Description: INTERVENTIONS:  - Monitor vital signs, rhythm, and trends  - Monitor for bleeding, hypotension and signs of decreased cardiac output  - Evaluate effectiveness of vasoactive medications to optimize hemodynamic stability  - Monitor arterial and/or venous puncture sites for bleeding and/or hematoma  - Assess quality of pulses, skin color and temperature  - Assess for signs of decreased coronary artery perfusion - ex.  Angina  - Evaluate fluid balance, assess for edema, trend weights  Outcome: Progressing  Goal: Absence of cardiac arrhythmias or at baseline  Description: INTERVENTIONS:  - Continuous cardiac monitoring, monitor vital signs, obtain 12 lead EKG if indicated  - Evaluate effectiveness of antiarrhythmic and heart rate control medications as ordered  - Initiate emergency measures for life threatening arrhythmias  - Monitor electrolytes and administer replacement therapy as ordered  Outcome: Progressing     Problem: RESPIRATORY - ADULT  Goal: Achieves optimal ventilation and oxygenation  Description: INTERVENTIONS:  - Assess for changes in respiratory status  - Assess for changes in mentation and behavior  - Position to facilitate oxygenation and minimize respiratory effort  - Oxygen supplementation based on oxygen saturation or ABGs  - Provide Smoking Cessation handout, if applicable  - Encourage broncho-pulmonary hygiene including cough, deep breathe, Incentive Spirometry  - Assess the need for suctioning and perform as needed  - Assess and instruct to report SOB or any respiratory difficulty  - Respiratory Therapy support as indicated  - Manage/alleviate anxiety  - Monitor for signs/symptoms of CO2 retention  Outcome: Progressing     Problem: GASTROINTESTINAL - ADULT  Goal: Maintains adequate nutritional intake (undernourished)  Description: INTERVENTIONS:  - Monitor percentage of each meal consumed  - Identify factors contributing to decreased intake, treat as appropriate  - Assist with meals as needed  - Monitor I&O, WT and lab values  - Obtain nutritional consult as needed  - Optimize oral hygiene and moisture  - Encourage food from home; allow for food preferences  - Enhance eating environment  Outcome: Progressing     Problem: HEMATOLOGIC - ADULT  Goal: Maintains hematologic stability  Description: INTERVENTIONS  - Assess for signs and symptoms of bleeding or hemorrhage  - Monitor labs and vital signs for trends  - Administer supportive blood products/factors, fluids and medications as ordered and appropriate  - Administer supportive blood products/factors as ordered and appropriate  Outcome: Progressing     Problem: MUSCULOSKELETAL - ADULT  Goal: Return mobility to safest level of function  Description: INTERVENTIONS:  - Assess patient stability and activity tolerance for standing, transferring and ambulating w/ or w/o assistive devices  - Assist with transfers and ambulation using safe patient handling equipment as needed  - Ensure adequate protection for wounds/incisions during mobilization  - Obtain PT/OT consults as needed  - Advance activity as appropriate  - Communicate ordered activity level and limitations with patient/family  Outcome: Progressing       Pt is alert and oriented x 4.  Vitals stable. Received the pt with O2 2L Nasal cannula. Weaned off O2 to Room air, O2 sats are ranging between %. Pt still short of breath with exertion and also with cough. Albuterol inhaler given as needed. Appetite is poor. Encouraged pt to eat small and frequent meals. C/o Generalized pain but declined any pain meds. Plan of care updated to the pt. Will continue to monitor. 1745 : Ambulated the pt to the bathroom, Pt was very short of breath with ambulation but O2 sats was 97 -100% on Room air right after back from  Walking to the bathroom.

## 2022-11-06 NOTE — PROGRESS NOTES
[address] [facility] Dosing Service  Warfarin (Coumadin) Subsequent Dosing    Gerber Souza is a 81year old patient for whom pharmacy is dosing warfarin (Coumadin). Goal INR is 2-3    Recent Labs   Lab 11/03/22  1729 11/04/22  0647 11/05/22  0712 11/06/22  0529   INR 2.11* 2.18* 2.93* 2.92*       Consulted by:  Dr. Rabia Cobb  Indication:  history of hypercoaguable state  Potential Drug Interactions:  vitamin K, Tamiflu, methylprednisolone  Other Anticoagulants:  none  Home regimen:  warfarin 7 mg on Monday and Friday and 5 mg the rest of the week    Inpatient Dosing History:    Date 11/3 11/4 11/5 11/6     INR 2.11 2.18 2.93 2.92     Coumadin dose 5 mg 7 mg 3 mg               Based on above -  1. For today, Give warfarin (COUMADIN) 3 mg at 2100 tonight  2   PT/INR ordered daily while on warfarin  3. Pharmacy will continue to follow. We appreciate the opportunity to assist in the care of this patient.     Marcelo Cagle, PharmD  11/6/2022  9:59 AM

## 2022-11-07 LAB
ALBUMIN SERPL-MCNC: 3.1 G/DL (ref 3.4–5)
ALBUMIN/GLOB SERPL: 0.8 {RATIO} (ref 1–2)
ALP LIVER SERPL-CCNC: 68 U/L
ALT SERPL-CCNC: 26 U/L
ANION GAP SERPL CALC-SCNC: 5 MMOL/L (ref 0–18)
AST SERPL-CCNC: 12 U/L (ref 15–37)
BILIRUB SERPL-MCNC: 0.2 MG/DL (ref 0.1–2)
BUN BLD-MCNC: 24 MG/DL (ref 7–18)
CALCIUM BLD-MCNC: 9.2 MG/DL (ref 8.5–10.1)
CHLORIDE SERPL-SCNC: 112 MMOL/L (ref 98–112)
CO2 SERPL-SCNC: 26 MMOL/L (ref 21–32)
CREAT BLD-MCNC: 0.85 MG/DL
GFR SERPLBLD BASED ON 1.73 SQ M-ARVRAT: 74 ML/MIN/1.73M2 (ref 60–?)
GLOBULIN PLAS-MCNC: 3.9 G/DL (ref 2.8–4.4)
GLUCOSE BLD-MCNC: 118 MG/DL (ref 70–99)
INR BLD: 2.6 (ref 0.85–1.16)
OSMOLALITY SERPL CALC.SUM OF ELEC: 301 MOSM/KG (ref 275–295)
POTASSIUM SERPL-SCNC: 3.8 MMOL/L (ref 3.5–5.1)
PROT SERPL-MCNC: 7 G/DL (ref 6.4–8.2)
PROTHROMBIN TIME: 27.5 SECONDS (ref 11.6–14.8)
SODIUM SERPL-SCNC: 143 MMOL/L (ref 136–145)

## 2022-11-07 PROCEDURE — 85610 PROTHROMBIN TIME: CPT | Performed by: HOSPITALIST

## 2022-11-07 PROCEDURE — 80053 COMPREHEN METABOLIC PANEL: CPT | Performed by: HOSPITALIST

## 2022-11-07 RX ORDER — WARFARIN SODIUM 5 MG/1
5 TABLET ORAL
Status: COMPLETED | OUTPATIENT
Start: 2022-11-07 | End: 2022-11-07

## 2022-11-07 NOTE — BH PROGRESS NOTE
RHYS met with this pt to follow up on a consult for anxiety and depression. Pt denied to have anxiety and derepression. Pt reported to live with her supportive family. Pt denied to see a psychiatrist and therapist ever. Pt also refused to have depression currently. Per pt she has a little bit anxiety due to her current hospitalization. SW offered resources on psychiatrist and therapist to follow up in future if help is required. Resources are provided on outpt therapist under dc instructions.

## 2022-11-07 NOTE — DISCHARGE INSTRUCTIONS
Please hold mycophenolate for 2 to 4 weeks until infection resolves and check with Dr. Daylin Soto     Sometimes managing your health at home requires assistance. The [facility] team has recognized your preference to use [facility]. They can be reached by phone at [phone]. The fax number for your reference is [phone]. A representative from the home health agency will contact you or your family to schedule your first visit. Resources on outpatient therapists in case needed in future. Please call them to make an appointment. [facility]  [address], [place], [address]  Phone: [phone]    Herbert Kern  [address] [place], [place], [address] [phone]. Extension:     Shasta Chikas  [address] [facility], [address] [phone].  Extension:

## 2022-11-07 NOTE — PLAN OF CARE
Received pt alert and oriented x4. VSS. Afebrile. Nonproductive cough noted. No complaints of pain. SOB with exertion, pt remains on RA. Meds given per MAR orders. Remdesivir day 3 completed. Pt up to bathroom x1 assist. Safety and isolation precautions in place and call light within reach.

## 2022-11-07 NOTE — PROGRESS NOTES
[facility] Dosing Service  Warfarin (Coumadin) Subsequent Dosing    Rayray Casillas is a 81year old patient for whom pharmacy is dosing warfarin (Coumadin). Goal INR is 2-3    Recent Labs   Lab 11/03/22  1729 11/04/22  0647 11/05/22  0712 11/06/22  0529 11/07/22  0657   INR 2.11* 2.18* 2.93* 2.92* 2.60*       Consulted by:  Dr Ruthy Wilder  Indication:  h/o hypercoaguable state  Potential Drug Interactions:  vitamin K, Tamiflu - may increase INR, prednisone  Other Anticoagulants:  none  Home regimen (if applicable):  7 mg on Monday and Friday, 5 mg ROW    Inpatient Dosing History:    Date 11/3 11/4 11/5 11/6 11/7    INR 2.11 2.18 2.93 2.92 2.60    Coumadin dose 5 mg 7 mg 3 mg 3 mg              Based on above -  1. For today, Give warfarin (COUMADIN) 5 mg at 2100 tonight  2   PT/INR ordered daily while on warfarin  3. Pharmacy will continue to follow. We appreciate the opportunity to assist in the care of this patient.     Angela Novak, [facility]  11/7/2022  10:13 AM

## 2022-11-07 NOTE — PROGRESS NOTES
22 1209   Over the last 2 weeks, how often have you been bothered by any of the following problems? Little interest or pleasure in doing things 0   Feeling down, depressed, or hopeless 0   Trouble falling or staying asleep, or sleeping too much 1   Feeling tired or having little energy 1   Poor appetite or overeating 1   Feeling bad about yourself - or that you are a failure or have let yourself or your family down 0   Trouble concentrating on things, such as reading the newspaper or watching television 0   Moving or speaking so slowly that other people could have noticed. Or the opposite - being so fidgety or restless that you have been moving around a lot more than usual 0   Thoughts that you would be better off dead, or of hurting yourself in some way 0   PHQ-9 TOTAL SCORE 3   If you checked off any problems, how difficult have these problems made it for you to do your work, take care of things at home, or get along with other people? Somewhat difficult   [address] [facility] Resource Referral Counselor Note    Lauryn Hahncolleen Patient Status:  Inpatient    1950 MRN [id number]   [facility] 4NW-A Attending Kamron Cardoza MD   Hosp Day # 1 PCP Miller Sexton MD       S(subjective): Pt denied to have a history of depression and anxiety. Pt denies to see a psychologist /psychiatrist. Pt reports to have some anxiety currently due to her hospitalization. Pt is active with he PCP. Pt reports to reside with her supportive family. Pt denies SI/HI. O(objective): Pt is alert, oriented and participated in assessment. Pt's mood and behavior are WNL. A(assessment): SW met with this pt to complete PHQ 3. Pt scored 5. Pt denied to see a therapist and psychiatrist. Pt reported to be anxious due to her current hospitalization. Pt denied to have depression. Pt was open to get referrals/resources for the therapists. Pt denied SI/HI/aggression.       P(plan): SW will provide resources/referrals on therapists if services are needed in future.      Keesha DELVALLE  11/7/2022  12:09 PM

## 2022-11-08 LAB
ALBUMIN SERPL-MCNC: 3 G/DL (ref 3.4–5)
ALBUMIN/GLOB SERPL: 0.9 {RATIO} (ref 1–2)
ALP LIVER SERPL-CCNC: 74 U/L
ALT SERPL-CCNC: 23 U/L
ANION GAP SERPL CALC-SCNC: 6 MMOL/L (ref 0–18)
AST SERPL-CCNC: 10 U/L (ref 15–37)
BILIRUB SERPL-MCNC: 0.2 MG/DL (ref 0.1–2)
BUN BLD-MCNC: 24 MG/DL (ref 7–18)
CALCIUM BLD-MCNC: 9 MG/DL (ref 8.5–10.1)
CHLORIDE SERPL-SCNC: 113 MMOL/L (ref 98–112)
CO2 SERPL-SCNC: 25 MMOL/L (ref 21–32)
CREAT BLD-MCNC: 1 MG/DL
GFR SERPLBLD BASED ON 1.73 SQ M-ARVRAT: 61 ML/MIN/1.73M2 (ref 60–?)
GLOBULIN PLAS-MCNC: 3.3 G/DL (ref 2.8–4.4)
GLUCOSE BLD-MCNC: 111 MG/DL (ref 70–99)
INR BLD: 2.27 (ref 0.85–1.16)
OSMOLALITY SERPL CALC.SUM OF ELEC: 303 MOSM/KG (ref 275–295)
POTASSIUM SERPL-SCNC: 3.9 MMOL/L (ref 3.5–5.1)
PROT SERPL-MCNC: 6.3 G/DL (ref 6.4–8.2)
PROTHROMBIN TIME: 24.8 SECONDS (ref 11.6–14.8)
SODIUM SERPL-SCNC: 144 MMOL/L (ref 136–145)

## 2022-11-08 PROCEDURE — 80053 COMPREHEN METABOLIC PANEL: CPT | Performed by: HOSPITALIST

## 2022-11-08 PROCEDURE — 85610 PROTHROMBIN TIME: CPT | Performed by: HOSPITALIST

## 2022-11-08 RX ORDER — WARFARIN SODIUM 5 MG/1
5 TABLET ORAL
Status: COMPLETED | OUTPATIENT
Start: 2022-11-08 | End: 2022-11-08

## 2022-11-08 NOTE — PLAN OF CARE
Appears comfortable. Ambulating to the bathroom w/ assistance. Has generalized pain but declines pain med. Voiding w/ no difficulty. BM x 1 this shift. No SOB, sats 99% on RA. No SOB when getting up.

## 2022-11-08 NOTE — CONSULTS
Pharmacy Dosing Service: Warfarin (Coumadin)  Abdiel Cousin is a 81year old female for whom pharmacy has been dosing warfarin (Coumadin). Goal INR is 2-3    Recent Labs   Lab 11/04/22  0647 11/05/22  0712 11/06/22  0529 11/07/22  0657 11/08/22  0642   INR 2.18* 2.93* 2.92* 2.60* 2.27*     Potential Drug Interactions:  vitamin K, Tamiflu - may increase INR, prednisone  Other Anticoagulants:  none  Home regimen (if applicable):  7 mg on Monday and Friday, 5 mg ROW  Date/Time last dose was given: 11/7 2106  Inpatient Dosing History:     Date 11/3 11/4 11/5 11/6 11/7 11/8    INR 2.11 2.18 2.93 2.92 2.60  2.27   Coumadin dose 5 mg 7 mg 3 mg 3 mg  5  5                                                                             Based on above -  1. For today, Give warfarin (COUMADIN) 5 mg at 2100 tonight  2   PT/INR ordered daily while on coumadin  3. Pharmacy will continue to follow. We appreciate the opportunity to assist in her care.     Jason Amezquita, PharmD  11/8/2022  1:41 PM

## 2022-11-09 VITALS
WEIGHT: 140.69 LBS | HEART RATE: 56 BPM | BODY MASS INDEX: 27.62 KG/M2 | RESPIRATION RATE: 20 BRPM | OXYGEN SATURATION: 93 % | SYSTOLIC BLOOD PRESSURE: 117 MMHG | DIASTOLIC BLOOD PRESSURE: 58 MMHG | TEMPERATURE: 98 F | HEIGHT: 60 IN

## 2022-11-09 LAB
INR BLD: 2.2 (ref 0.85–1.16)
PROTHROMBIN TIME: 24.2 SECONDS (ref 11.6–14.8)

## 2022-11-09 PROCEDURE — 97116 GAIT TRAINING THERAPY: CPT

## 2022-11-09 PROCEDURE — 85610 PROTHROMBIN TIME: CPT | Performed by: HOSPITALIST

## 2022-11-09 PROCEDURE — 97162 PT EVAL MOD COMPLEX 30 MIN: CPT

## 2022-11-09 RX ORDER — WARFARIN SODIUM 5 MG/1
5 TABLET ORAL
Status: DISCONTINUED | OUTPATIENT
Start: 2022-11-09 | End: 2022-11-09

## 2022-11-09 RX ORDER — GUAIFENESIN 600 MG/1
600 TABLET, EXTENDED RELEASE ORAL 2 TIMES DAILY
Qty: 20 TABLET | Refills: 0 | Status: SHIPPED | OUTPATIENT
Start: 2022-11-09

## 2022-11-09 RX ORDER — PREDNISONE 20 MG/1
40 TABLET ORAL
Qty: 2 TABLET | Refills: 0 | Status: SHIPPED | OUTPATIENT
Start: 2022-11-10

## 2022-11-09 NOTE — PLAN OF CARE
Resting in bed in high back rest. Alert & oriented. Able to express needs to staff. Still w/ hoarse voice. Taking tums & maalox for indigestion PRN. Still on prednisone,tolerating w/ no adverse reactions. O2 sats on %, gets our of breath on exertion w/ O2 sat 94%. Clear for discharge from pulmo standpoint. Will need to follow up in 2 weeks out patient. Informed dr Elsy Tovar. She is waiting for PT eval before discharging patient. Patient ambulates to the bathroom w/ aid of walker,typically uses cane to ambulate short distances.

## 2022-11-09 NOTE — CONSULTS
[facility] Dosing Service  Warfarin (Coumadin) Subsequent Dosing    Rosaura Mckeon is a 81year old patient for whom pharmacy is dosing warfarin (Coumadin). Goal INR is 2-3    Recent Labs   Lab 11/05/22  0712 11/06/22  0529 11/07/22  0657 11/08/22  0642 11/09/22  0647   INR 2.93* 2.92* 2.60* 2.27* 2.20*       Consulted by:  Dr. Duncan Bashir  Indication:  h/o hypercoaguable state  Potential Drug Interactions:  Pt supplied vitamin K tablets, prednisone  Other Anticoagulants:  none  Home regimen (if applicable):  warfarin 7 mg MoFr, warfarin 5 mg ROW    Inpatient Dosing History:    Date 11/3 11/4 11/5 11/6 11/7 11/8 11/9   INR 2.11 2.18 2.93 2.92 2.60 2.27 2.20   Coumadin dose 5 mg 7 mg 3 mg 3 mg 5 mg 5 mg             Based on above -  1. For today, Give warfarin (COUMADIN) 5 mg at 2100 tonight  2   PT/INR ordered daily while on warfarin  3. Pharmacy will continue to follow. We appreciate the opportunity to assist in the care of this patient.     Arabella Hamilton, PharmD  11/9/2022  2:34 PM

## 2022-11-09 NOTE — PROGRESS NOTES
Patient chart reviewed for discharge: Medication Reconciliation completed, Specialist/PCP follow up listed, and disease specific Instructions/Education included in After Visit Summary. Discharge RN notified patient's RN of AVS completion and verified all consultants have signed off. Patient's RN to notify DC RN if discharge status changes. Pulmonary Dr. Jordin Waite recommending P.T. eval before discharge d/t pt's SOB w/ activity. P.T. eval ordered today. Pt's RN Elvis Aguiar called and informed dept.  That pt's DC is pending eval.

## 2022-11-09 NOTE — HOME CARE LIAISON
Received referral via [employer] for Home Health services. Spoke w/ patient and provided with list of [facility] providers from [facility], choice is [facility]. Agency reserved in [facility] and contact information placed on AVS.Financial interest disclosure provided.  Notified Marisela Pelaez

## 2022-11-09 NOTE — PLAN OF CARE
Patient A&O x4, lungs diminished, c/o what she called regular pains but no request for PRN. Patient voice is hoarse and hard to understand with her mask on. Patient ambulates well, but stops frequently due to being SOB.

## 2022-11-09 NOTE — CM/SW NOTE
SW was notified by PT that they are recommending home health agency for patient. SW sent referral for [facility] services and will provide choice list to patient once it is available. SW will continue to follow for plan of care changes and remain available for any additional DC needs or concerns.      Mora Rojas MSW, LSW  Discharge Planner   [phone]

## 2022-11-29 ENCOUNTER — APPOINTMENT (OUTPATIENT)
Dept: GENERAL RADIOLOGY | Facility: HOSPITAL | Age: 72
End: 2022-11-29
Attending: EMERGENCY MEDICINE
Payer: MEDICARE

## 2022-11-29 ENCOUNTER — APPOINTMENT (OUTPATIENT)
Dept: CT IMAGING | Facility: HOSPITAL | Age: 72
End: 2022-11-29
Attending: EMERGENCY MEDICINE
Payer: MEDICARE

## 2022-11-29 ENCOUNTER — HOSPITAL ENCOUNTER (EMERGENCY)
Facility: HOSPITAL | Age: 72
Discharge: HOME | End: 2022-11-29
Attending: EMERGENCY MEDICINE
Payer: MEDICARE

## 2022-11-29 VITALS
HEART RATE: 75 BPM | RESPIRATION RATE: 21 BRPM | WEIGHT: 140 LBS | BODY MASS INDEX: 27.48 KG/M2 | SYSTOLIC BLOOD PRESSURE: 102 MMHG | TEMPERATURE: 97 F | HEIGHT: 60 IN | OXYGEN SATURATION: 98 % | DIASTOLIC BLOOD PRESSURE: 66 MMHG

## 2022-11-29 DIAGNOSIS — R49.0 VOICE HOARSENESS: ICD-10-CM

## 2022-11-29 DIAGNOSIS — R06.82 TACHYPNEA: Primary | ICD-10-CM

## 2022-11-29 LAB
ALBUMIN SERPL-MCNC: 3.2 G/DL (ref 3.4–5)
ALBUMIN/GLOB SERPL: 0.8 {RATIO} (ref 1–2)
ALP LIVER SERPL-CCNC: 68 U/L
ALT SERPL-CCNC: 17 U/L
ANION GAP SERPL CALC-SCNC: 2 MMOL/L (ref 0–18)
AST SERPL-CCNC: 13 U/L (ref 15–37)
BASE EXCESS BLDV CALC-SCNC: 1.7 MMOL/L
BASOPHILS # BLD AUTO: 0.02 X10(3) UL (ref 0–0.2)
BASOPHILS NFR BLD AUTO: 0.3 %
BILIRUB SERPL-MCNC: 0.2 MG/DL (ref 0.1–2)
BUN BLD-MCNC: 13 MG/DL (ref 7–18)
CALCIUM BLD-MCNC: 8.8 MG/DL (ref 8.5–10.1)
CHLORIDE SERPL-SCNC: 113 MMOL/L (ref 98–112)
CO2 SERPL-SCNC: 26 MMOL/L (ref 21–32)
CREAT BLD-MCNC: 0.88 MG/DL
D DIMER PPP FEU-MCNC: <0.27 UG/ML FEU (ref ?–0.7)
EOSINOPHIL # BLD AUTO: 0.21 X10(3) UL (ref 0–0.7)
EOSINOPHIL NFR BLD AUTO: 3 %
ERYTHROCYTE [DISTWIDTH] IN BLOOD BY AUTOMATED COUNT: 14.9 %
GFR SERPLBLD BASED ON 1.73 SQ M-ARVRAT: 71 ML/MIN/1.73M2 (ref 60–?)
GLOBULIN PLAS-MCNC: 3.8 G/DL (ref 2.8–4.4)
GLUCOSE BLD-MCNC: 116 MG/DL (ref 70–99)
HCO3 BLDV-SCNC: 25.6 MEQ/L (ref 22–26)
HCT VFR BLD AUTO: 32.6 %
HGB BLD-MCNC: 10.8 G/DL
IMM GRANULOCYTES # BLD AUTO: 0.03 X10(3) UL (ref 0–1)
IMM GRANULOCYTES NFR BLD: 0.4 %
INR BLD: 2.97 (ref 0.85–1.16)
LYMPHOCYTES # BLD AUTO: 1.72 X10(3) UL (ref 1–4)
LYMPHOCYTES NFR BLD AUTO: 24.9 %
MCH RBC QN AUTO: 27.4 PG (ref 26–34)
MCHC RBC AUTO-ENTMCNC: 33.1 G/DL (ref 31–37)
MCV RBC AUTO: 82.7 FL
MONOCYTES # BLD AUTO: 0.73 X10(3) UL (ref 0.1–1)
MONOCYTES NFR BLD AUTO: 10.6 %
NEUTROPHILS # BLD AUTO: 4.2 X10 (3) UL (ref 1.5–7.7)
NEUTROPHILS # BLD AUTO: 4.2 X10(3) UL (ref 1.5–7.7)
NEUTROPHILS NFR BLD AUTO: 60.8 %
NT-PROBNP SERPL-MCNC: 53 PG/ML (ref ?–125)
OSMOLALITY SERPL CALC.SUM OF ELEC: 293 MOSM/KG (ref 275–295)
OXYHGB MFR BLDV: 72.8 % (ref 72–78)
PCO2 BLDV: 42 MM HG (ref 38–50)
PH BLDV: 7.41 [PH] (ref 7.33–7.43)
PLATELET # BLD AUTO: 238 10(3)UL (ref 150–450)
PO2 BLDV: 43 MM HG (ref 30–50)
POTASSIUM SERPL-SCNC: 3.5 MMOL/L (ref 3.5–5.1)
PROT SERPL-MCNC: 7 G/DL (ref 6.4–8.2)
PROTHROMBIN TIME: 30.5 SECONDS (ref 11.6–14.8)
RBC # BLD AUTO: 3.94 X10(6)UL
SARS-COV-2 RNA RESP QL NAA+PROBE: NOT DETECTED
SODIUM SERPL-SCNC: 141 MMOL/L (ref 136–145)
TROPONIN I HIGH SENSITIVITY: 15 NG/L
WBC # BLD AUTO: 6.9 X10(3) UL (ref 4–11)

## 2022-11-29 PROCEDURE — 80053 COMPREHEN METABOLIC PANEL: CPT | Performed by: EMERGENCY MEDICINE

## 2022-11-29 PROCEDURE — 70491 CT SOFT TISSUE NECK W/DYE: CPT | Performed by: EMERGENCY MEDICINE

## 2022-11-29 PROCEDURE — 83880 ASSAY OF NATRIURETIC PEPTIDE: CPT | Performed by: EMERGENCY MEDICINE

## 2022-11-29 PROCEDURE — 71045 X-RAY EXAM CHEST 1 VIEW: CPT | Performed by: EMERGENCY MEDICINE

## 2022-11-29 PROCEDURE — 31575 DIAGNOSTIC LARYNGOSCOPY: CPT

## 2022-11-29 PROCEDURE — 99285 EMERGENCY DEPT VISIT HI MDM: CPT

## 2022-11-29 PROCEDURE — 85379 FIBRIN DEGRADATION QUANT: CPT | Performed by: EMERGENCY MEDICINE

## 2022-11-29 PROCEDURE — 94640 AIRWAY INHALATION TREATMENT: CPT

## 2022-11-29 PROCEDURE — 93005 ELECTROCARDIOGRAM TRACING: CPT

## 2022-11-29 PROCEDURE — S0028 INJECTION, FAMOTIDINE, 20 MG: HCPCS | Performed by: EMERGENCY MEDICINE

## 2022-11-29 PROCEDURE — 85610 PROTHROMBIN TIME: CPT | Performed by: EMERGENCY MEDICINE

## 2022-11-29 PROCEDURE — 84484 ASSAY OF TROPONIN QUANT: CPT | Performed by: EMERGENCY MEDICINE

## 2022-11-29 PROCEDURE — 96374 THER/PROPH/DIAG INJ IV PUSH: CPT

## 2022-11-29 PROCEDURE — 82803 BLOOD GASES ANY COMBINATION: CPT | Performed by: EMERGENCY MEDICINE

## 2022-11-29 PROCEDURE — 96375 TX/PRO/DX INJ NEW DRUG ADDON: CPT

## 2022-11-29 PROCEDURE — 85025 COMPLETE CBC W/AUTO DIFF WBC: CPT | Performed by: EMERGENCY MEDICINE

## 2022-11-29 PROCEDURE — 70360 X-RAY EXAM OF NECK: CPT | Performed by: EMERGENCY MEDICINE

## 2022-11-29 RX ORDER — METHYLPREDNISOLONE SODIUM SUCCINATE 125 MG/2ML
125 INJECTION, POWDER, LYOPHILIZED, FOR SOLUTION INTRAMUSCULAR; INTRAVENOUS ONCE
Status: COMPLETED | OUTPATIENT
Start: 2022-11-29 | End: 2022-11-29

## 2022-11-29 RX ORDER — IOHEXOL 350 MG/ML
50 INJECTION, SOLUTION INTRAVENOUS
Status: COMPLETED | OUTPATIENT
Start: 2022-11-29 | End: 2022-11-29

## 2022-11-29 RX ORDER — IPRATROPIUM BROMIDE AND ALBUTEROL SULFATE 2.5; .5 MG/3ML; MG/3ML
3 SOLUTION RESPIRATORY (INHALATION) ONCE
Status: COMPLETED | OUTPATIENT
Start: 2022-11-29 | End: 2022-11-29

## 2022-11-29 RX ORDER — DEXTROSE AND SODIUM CHLORIDE 5; .45 G/100ML; G/100ML
INJECTION, SOLUTION INTRAVENOUS CONTINUOUS
OUTPATIENT
Start: 2022-11-29 | End: 2022-11-29

## 2022-11-29 RX ORDER — DIPHENHYDRAMINE HYDROCHLORIDE 50 MG/ML
25 INJECTION INTRAMUSCULAR; INTRAVENOUS ONCE
Status: COMPLETED | OUTPATIENT
Start: 2022-11-29 | End: 2022-11-29

## 2022-11-29 RX ORDER — FAMOTIDINE 10 MG/ML
20 INJECTION, SOLUTION INTRAVENOUS ONCE
Status: COMPLETED | OUTPATIENT
Start: 2022-11-29 | End: 2022-11-29

## 2022-11-29 NOTE — CM/SW NOTE
EDCM called to assist patient with f/u appointment.  Pt currently has an ENT appointment tomorrow with Dr. Marcelino Stevens however, Dr. Wing Diez did a laryngoscopy at bedside and recommended the patient f/u with :    Dr. Alicia Dunn   Otolaryngology   [phone]

## 2022-11-29 NOTE — ED QUICK NOTES
Recd report from [facility], patient updated on POC, medicated per [facility] in preparation for CT    Denies any complaints at this time, call light within reach,  at bedside

## 2022-11-29 NOTE — ED QUICK NOTES
MD Pranav Miller from ENT at bedside for nasal scope for vocal cord visualization, NAD noted at this time per MD, cords visualized w/o difficulty, patient tolerated well.

## 2022-11-29 NOTE — ED QUICK NOTES
Orders for admission, patient is aware of plan and ready to go upstairs. Any questions, please call ED RN Emily at extension [phone].      Patient Covid vaccination status: Fully vaccinated     COVID Test Ordered in ED: Rapid SARS-CoV-2 by PCR    COVID Suspicion at Admission: N/A w/ negative covid    Running Infusions:  None    Mental Status/LOC at time of transport: A&Ox3    Other pertinent information: patient prefers to write on paper rather than speak d/t laryngeal pain  CIWA score: N/A   NIH score:  N/A

## 2022-11-29 NOTE — DISCHARGE INSTRUCTIONS
You need to see Adonay Cantu from ENT for vocal therapy  We will have  assist you with outpatient follow-up

## 2022-11-30 LAB
ATRIAL RATE: 66 BPM
P AXIS: 69 DEGREES
P-R INTERVAL: 154 MS
Q-T INTERVAL: 410 MS
QRS DURATION: 104 MS
QTC CALCULATION (BEZET): 429 MS
R AXIS: -1 DEGREES
T AXIS: 22 DEGREES
VENTRICULAR RATE: 66 BPM

## 2022-11-30 NOTE — CM/SW NOTE
[facility] called Dr. Tyler James office spoke with Ravindra Warner RN who stated that the patient should keep the appointment with Dr. Faith Cervantes and they will work with Dr. Irasema Deras for a f/u appointment. [facility]  who was informed the patient was to keep her appointment today with Dr. Faith Cervantes at [phone] and they would do the f/u appointment for Dr. Irasema Deras. Pt  verbalized understanding and had no further questions.

## 2022-12-09 ENCOUNTER — HOSPITAL ENCOUNTER (OUTPATIENT)
Dept: MAMMOGRAPHY | Facility: HOSPITAL | Age: 72
Discharge: HOME | End: 2022-12-09
Attending: SURGERY
Payer: MEDICARE

## 2022-12-09 DIAGNOSIS — Z12.31 ENCOUNTER FOR SCREENING MAMMOGRAM FOR MALIGNANT NEOPLASM OF BREAST: ICD-10-CM

## 2022-12-09 DIAGNOSIS — Z90.11 S/P MASTECTOMY, RIGHT: ICD-10-CM

## 2022-12-09 PROCEDURE — 77063 BREAST TOMOSYNTHESIS BI: CPT | Performed by: SURGERY

## 2022-12-09 PROCEDURE — 77067 SCR MAMMO BI INCL CAD: CPT | Performed by: SURGERY

## 2022-12-13 VITALS — BODY MASS INDEX: 27.48 KG/M2 | HEIGHT: 60 IN | WEIGHT: 140 LBS

## 2022-12-16 ENCOUNTER — HOSPITAL ENCOUNTER (OUTPATIENT)
Dept: INTERVENTIONAL RADIOLOGY/VASCULAR | Facility: HOSPITAL | Age: 72
Discharge: HOME | End: 2022-12-16
Attending: INTERNAL MEDICINE
Payer: MEDICARE

## 2022-12-22 ENCOUNTER — HOSPITAL ENCOUNTER (OUTPATIENT)
Dept: MAMMOGRAPHY | Facility: HOSPITAL | Age: 72
Discharge: HOME | End: 2022-12-22
Attending: SURGERY
Payer: MEDICARE

## 2022-12-22 DIAGNOSIS — R92.2 INCONCLUSIVE MAMMOGRAM: ICD-10-CM

## 2022-12-22 PROCEDURE — 76642 ULTRASOUND BREAST LIMITED: CPT | Performed by: SURGERY

## 2022-12-22 PROCEDURE — 77061 BREAST TOMOSYNTHESIS UNI: CPT | Performed by: SURGERY

## 2022-12-22 PROCEDURE — 77065 DX MAMMO INCL CAD UNI: CPT | Performed by: SURGERY

## 2022-12-28 ENCOUNTER — HOSPITAL ENCOUNTER (OUTPATIENT)
Dept: GENERAL RADIOLOGY | Facility: HOSPITAL | Age: 72
Discharge: HOME | End: 2022-12-28
Attending: INTERNAL MEDICINE
Payer: MEDICARE

## 2022-12-28 PROCEDURE — 36591 DRAW BLOOD OFF VENOUS DEVICE: CPT

## 2023-01-10 RX ORDER — ENOXAPARIN SODIUM 100 MG/ML
1 INJECTION SUBCUTANEOUS DAILY
COMMUNITY

## 2023-01-16 ENCOUNTER — HOSPITAL ENCOUNTER (OUTPATIENT)
Dept: INTERVENTIONAL RADIOLOGY/VASCULAR | Facility: HOSPITAL | Age: 73
Discharge: HOME | End: 2023-01-16
Attending: INTERNAL MEDICINE | Admitting: INTERNAL MEDICINE
Payer: MEDICARE

## 2023-01-16 VITALS
RESPIRATION RATE: 20 BRPM | SYSTOLIC BLOOD PRESSURE: 139 MMHG | HEART RATE: 68 BPM | TEMPERATURE: 97 F | HEIGHT: 60 IN | DIASTOLIC BLOOD PRESSURE: 60 MMHG | WEIGHT: 140 LBS | OXYGEN SATURATION: 96 % | BODY MASS INDEX: 27.48 KG/M2

## 2023-01-16 DIAGNOSIS — R94.39 ABNORMAL STRESS TEST: ICD-10-CM

## 2023-01-16 PROCEDURE — 93460 R&L HRT ART/VENTRICLE ANGIO: CPT | Performed by: INTERNAL MEDICINE

## 2023-01-16 PROCEDURE — B2111ZZ FLUOROSCOPY OF MULTIPLE CORONARY ARTERIES USING LOW OSMOLAR CONTRAST: ICD-10-PCS | Performed by: CHIROPRACTOR

## 2023-01-16 PROCEDURE — B2161ZZ FLUOROSCOPY OF RIGHT AND LEFT HEART USING LOW OSMOLAR CONTRAST: ICD-10-PCS | Performed by: CHIROPRACTOR

## 2023-01-16 PROCEDURE — 99152 MOD SED SAME PHYS/QHP 5/>YRS: CPT | Performed by: INTERNAL MEDICINE

## 2023-01-16 PROCEDURE — 4A023N8 MEASUREMENT OF CARDIAC SAMPLING AND PRESSURE, BILATERAL, PERCUTANEOUS APPROACH: ICD-10-PCS | Performed by: CHIROPRACTOR

## 2023-01-16 RX ORDER — MIDAZOLAM HYDROCHLORIDE 1 MG/ML
INJECTION INTRAMUSCULAR; INTRAVENOUS
Status: COMPLETED
Start: 2023-01-16 | End: 2023-01-16

## 2023-01-16 RX ORDER — NITROGLYCERIN 20 MG/100ML
INJECTION INTRAVENOUS
Status: COMPLETED
Start: 2023-01-16 | End: 2023-01-16

## 2023-01-16 RX ORDER — IODIXANOL 320 MG/ML
100 INJECTION, SOLUTION INTRAVASCULAR
Status: COMPLETED | OUTPATIENT
Start: 2023-01-16 | End: 2023-01-16

## 2023-01-16 RX ORDER — SODIUM CHLORIDE 9 MG/ML
INJECTION, SOLUTION INTRAVENOUS
Status: DISCONTINUED | OUTPATIENT
Start: 2023-01-17 | End: 2023-01-16 | Stop reason: HOSPADM

## 2023-01-16 RX ORDER — LIDOCAINE HYDROCHLORIDE 10 MG/ML
INJECTION, SOLUTION EPIDURAL; INFILTRATION; INTRACAUDAL; PERINEURAL
Status: COMPLETED
Start: 2023-01-16 | End: 2023-01-16

## 2023-01-16 RX ORDER — VERAPAMIL HYDROCHLORIDE 2.5 MG/ML
INJECTION, SOLUTION INTRAVENOUS
Status: COMPLETED
Start: 2023-01-16 | End: 2023-01-16

## 2023-01-16 RX ORDER — HEPARIN SODIUM 5000 [USP'U]/ML
INJECTION, SOLUTION INTRAVENOUS; SUBCUTANEOUS
Status: COMPLETED
Start: 2023-01-16 | End: 2023-01-16

## 2023-01-16 RX ADMIN — IODIXANOL 74 ML: 320 INJECTION, SOLUTION INTRAVASCULAR at 17:42:00

## 2023-01-16 NOTE — PROCEDURES
[facility]. 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 PROCEDURE PERFORMED:  right heart catheterization, left heart catheterization, left ventriculogram, selective coronary angiography      PROCEDURE:  The patient was brought to the cardiac catheterization lab in the fasting state. Informed consent was obtained. Moderate sedation was employed using IV Versed 2mg and IV fentanyl 50mcg. I directly observed the patient from [phone] to [phone], for a total of 17 minutes, and an independent trained observer was present and assisted in the monitoring of the patient's level of consciousness and physiological status, watching the heart rate, blood pressure, oximetry, and rhythm, in addition to total moderation time. ACCESS/CATHETER PLACEMENT:   The right internal jugular vein was accessed with ultrasound guidance and a micropuncture needle, an 8F sheath was placed and right heart catheterization as performed per usual routine. The right radial area was prepped and draped in a sterile manner and anesthetized with 2% lidocaine. The right radial artery was accessed, and a 6-Uzbek, 11 cm sheath was placed. Left and right selective coronary angiography was performed using a 6F Tiger 4.0 catheter. A pigtail catheter was used to cross the aortic valve, measure left ventricular pressure and perform 30 degree LOPEZ ventriculogram.  It was pulled across the valve to assess for aortic stenosis. At the conclusion of the study, the right radial artery hemostasis was performed using a radial band. The RIJ sheath was removed and hemostasis was achieved manually. FINDINGS:      1. Right heart catheterization  RA pressure: 0/1/0 mmHg  RV pressure: 24/0 mmHg  PA pressure: 18/0/9 mmHg  PA wedge pressure: 3/2/1 mmHg  Cardiac output:  6.9 L/min  Cardiac index: 4.3 L/min/m2  Pulmonary vascular resistance: 1.9 Woods units     OXIMETRY:  SVC saturation: 77.3% Hg 11.6/gdL  PA saturation: 74.8%  Aortic saturation: 93.4%    2. Left heart catheterization:     Left ventricular pressure: 162/5 mmHg  Aortic pressure: 162/69/112 mmHg    Left ventriculogram demonstrated a LV ejection fraction of 65-70% without significant mitral regurgitation; there are no regional wall motion abnormalities. There was no aortic stenosis upon pullback of the catheter. 3.  Selective coronary angiography:      Left main artery: The left main artery is a medium caliber, bifurcating vessel without significant angiographic disease. LAD:  The left anterior descending artery is a medium caliber vessel that wraps around the apex and gives rise to a medium diagonal branch. Mild non-obstructive disease of the mid LAD is present. LCx: The left circumflex artery is a medium size vessel that gives rise to a large branching, ostial obtuse marginal branch. The first superior limb off the OM branch demonsrates a focal 80% ostial stenosis. Otherwise, mild diffuse disease present. RCA:  The right coronary artery is a medium caliber, dominant vessel that gives rise to a medium rPDA. No significant angiographic disease. MEDICATIONS:  See nursing record. COMPLICATIONS:  No major complications were observed during this visit to the catheterization lab. IMPRESSION:    1. Right heart catheterization:   - low right heart filling pressures  - no pulmonary arterial hypertension  - low pulmonary capillary wedge pressure  - normal cardiac output/cardiac index  - no intracardiac shunt by saturations    2. Left heart catheterization: LVEDP 5mmHg, no aortic stenosis.   LVEF 65-70% with normal wall motion, no mitral regurgitation  3. Coronary angiography:  right dominant system  - LM: no significant angiographic disease   - LAD: mild non-obstructive mid vessel disease  - LCx: 80% OM1 stenosis (small branch)  - RCA: no significant angiographic disease     RECOMMENDATIONS: Small caliber branch of obtuse marginal vessel is likely not a culprit lesion to cause significant dyspnea; further, not matching area of ischemic by nuclear stress testing. Thus, will treat medically, no percutaneous revascularization performed.         Geovany Martinez MD  1/16/2023  5:33 PM

## 2023-01-17 NOTE — IVS NOTE
Patient discharged home post cardiac cath. Pt is able to sit up and ambulate without difficulty. Pt's vital signs are stable. Right wrist procedural access site is dry and intact with no signs and symptoms of bleeding or hematoma. Pt tolerated food/fluids. Dr. Poli Malcolm spoke to pt/family post procedure. Instructions provided and pt/family verbalized understanding. PIV removed. Patient discharged in wheelchair with all belongings. Friend driving home.

## 2023-07-05 ENCOUNTER — HOSPITAL ENCOUNTER (EMERGENCY)
Facility: HOSPITAL | Age: 73
Discharge: HOME | End: 2023-07-06
Attending: EMERGENCY MEDICINE
Payer: MEDICARE

## 2023-07-05 ENCOUNTER — APPOINTMENT (OUTPATIENT)
Dept: ULTRASOUND IMAGING | Facility: HOSPITAL | Age: 73
End: 2023-07-05
Attending: EMERGENCY MEDICINE
Payer: MEDICARE

## 2023-07-05 VITALS
SYSTOLIC BLOOD PRESSURE: 152 MMHG | OXYGEN SATURATION: 97 % | HEART RATE: 68 BPM | RESPIRATION RATE: 18 BRPM | HEIGHT: 60 IN | BODY MASS INDEX: 28.27 KG/M2 | DIASTOLIC BLOOD PRESSURE: 63 MMHG | WEIGHT: 144 LBS | TEMPERATURE: 98 F

## 2023-07-05 DIAGNOSIS — I80.8 SUPERFICIAL THROMBOPHLEBITIS OF RIGHT UPPER EXTREMITY: Primary | ICD-10-CM

## 2023-07-05 LAB
ALBUMIN SERPL-MCNC: 3.5 G/DL (ref 3.4–5)
ALBUMIN/GLOB SERPL: 1 {RATIO} (ref 1–2)
ALP LIVER SERPL-CCNC: 79 U/L
ALT SERPL-CCNC: 23 U/L
ANION GAP SERPL CALC-SCNC: 4 MMOL/L (ref 0–18)
APTT PPP: 30.3 SECONDS (ref 23.3–35.6)
AST SERPL-CCNC: 23 U/L (ref 15–37)
BASOPHILS # BLD AUTO: 0.03 X10(3) UL (ref 0–0.2)
BASOPHILS NFR BLD AUTO: 0.3 %
BILIRUB SERPL-MCNC: 0.3 MG/DL (ref 0.1–2)
BUN BLD-MCNC: 18 MG/DL (ref 7–18)
CALCIUM BLD-MCNC: 9.1 MG/DL (ref 8.5–10.1)
CHLORIDE SERPL-SCNC: 110 MMOL/L (ref 98–112)
CO2 SERPL-SCNC: 28 MMOL/L (ref 21–32)
CREAT BLD-MCNC: 0.89 MG/DL
EOSINOPHIL # BLD AUTO: 0.17 X10(3) UL (ref 0–0.7)
EOSINOPHIL NFR BLD AUTO: 1.7 %
ERYTHROCYTE [DISTWIDTH] IN BLOOD BY AUTOMATED COUNT: 14.5 %
GFR SERPLBLD BASED ON 1.73 SQ M-ARVRAT: 70 ML/MIN/1.73M2 (ref 60–?)
GLOBULIN PLAS-MCNC: 3.6 G/DL (ref 2.8–4.4)
GLUCOSE BLD-MCNC: 95 MG/DL (ref 70–99)
HCT VFR BLD AUTO: 33.7 %
HGB BLD-MCNC: 11 G/DL
IMM GRANULOCYTES # BLD AUTO: 0.04 X10(3) UL (ref 0–1)
IMM GRANULOCYTES NFR BLD: 0.4 %
INR BLD: 1.62 (ref 0.85–1.16)
LYMPHOCYTES # BLD AUTO: 2.72 X10(3) UL (ref 1–4)
LYMPHOCYTES NFR BLD AUTO: 26.5 %
MCH RBC QN AUTO: 26.2 PG (ref 26–34)
MCHC RBC AUTO-ENTMCNC: 32.6 G/DL (ref 31–37)
MCV RBC AUTO: 80.2 FL
MONOCYTES # BLD AUTO: 0.99 X10(3) UL (ref 0.1–1)
MONOCYTES NFR BLD AUTO: 9.6 %
NEUTROPHILS # BLD AUTO: 6.31 X10 (3) UL (ref 1.5–7.7)
NEUTROPHILS # BLD AUTO: 6.31 X10(3) UL (ref 1.5–7.7)
NEUTROPHILS NFR BLD AUTO: 61.5 %
OSMOLALITY SERPL CALC.SUM OF ELEC: 296 MOSM/KG (ref 275–295)
PLATELET # BLD AUTO: 180 10(3)UL (ref 150–450)
POTASSIUM SERPL-SCNC: 3.3 MMOL/L (ref 3.5–5.1)
PROT SERPL-MCNC: 7.1 G/DL (ref 6.4–8.2)
PROTHROMBIN TIME: 19.1 SECONDS (ref 11.6–14.8)
RBC # BLD AUTO: 4.2 X10(6)UL
SODIUM SERPL-SCNC: 142 MMOL/L (ref 136–145)
WBC # BLD AUTO: 10.3 X10(3) UL (ref 4–11)

## 2023-07-05 PROCEDURE — 99285 EMERGENCY DEPT VISIT HI MDM: CPT

## 2023-07-05 PROCEDURE — 85730 THROMBOPLASTIN TIME PARTIAL: CPT | Performed by: EMERGENCY MEDICINE

## 2023-07-05 PROCEDURE — 96374 THER/PROPH/DIAG INJ IV PUSH: CPT

## 2023-07-05 PROCEDURE — 93971 EXTREMITY STUDY: CPT | Performed by: EMERGENCY MEDICINE

## 2023-07-05 PROCEDURE — 85610 PROTHROMBIN TIME: CPT | Performed by: EMERGENCY MEDICINE

## 2023-07-05 PROCEDURE — 80053 COMPREHEN METABOLIC PANEL: CPT | Performed by: EMERGENCY MEDICINE

## 2023-07-05 PROCEDURE — 99284 EMERGENCY DEPT VISIT MOD MDM: CPT

## 2023-07-05 PROCEDURE — 85025 COMPLETE CBC W/AUTO DIFF WBC: CPT | Performed by: EMERGENCY MEDICINE

## 2023-07-05 RX ORDER — ROSUVASTATIN CALCIUM 5 MG/1
5 TABLET, COATED ORAL NIGHTLY
COMMUNITY

## 2023-07-05 RX ORDER — POTASSIUM CHLORIDE 20 MEQ/1
20 TABLET, EXTENDED RELEASE ORAL ONCE
Status: COMPLETED | OUTPATIENT
Start: 2023-07-05 | End: 2023-07-05

## 2023-07-05 RX ORDER — HYDROCODONE BITARTRATE AND ACETAMINOPHEN 5; 325 MG/1; MG/1
1 TABLET ORAL EVERY 8 HOURS PRN
Qty: 10 TABLET | Refills: 0 | Status: SHIPPED | OUTPATIENT
Start: 2023-07-05

## 2023-07-05 RX ORDER — HYDRALAZINE HYDROCHLORIDE 100 MG/1
100 TABLET, FILM COATED ORAL 3 TIMES DAILY
COMMUNITY

## 2023-07-05 RX ORDER — HYDROMORPHONE HYDROCHLORIDE 1 MG/ML
0.5 INJECTION, SOLUTION INTRAMUSCULAR; INTRAVENOUS; SUBCUTANEOUS ONCE
Status: COMPLETED | OUTPATIENT
Start: 2023-07-05 | End: 2023-07-05

## 2023-07-05 NOTE — ED INITIAL ASSESSMENT (HPI)
Mastectomy in 1991 on right side. Reports swelling and pain to right arm that started 1 hour ago. A&ox4. +thinners.

## 2023-07-06 NOTE — DISCHARGE INSTRUCTIONS
Rest at home  Warm compresses to area of pain at home  Return to the ER if symptoms worsen or if any other problems arise

## 2023-07-21 ENCOUNTER — APPOINTMENT (OUTPATIENT)
Dept: ULTRASOUND IMAGING | Facility: HOSPITAL | Age: 73
DRG: 300 | End: 2023-07-21
Attending: EMERGENCY MEDICINE
Payer: MEDICARE

## 2023-07-21 ENCOUNTER — HOSPITAL ENCOUNTER (INPATIENT)
Facility: HOSPITAL | Age: 73
LOS: 3 days | Discharge: HOME | DRG: 300 | End: 2023-07-24
Attending: EMERGENCY MEDICINE | Admitting: INTERNAL MEDICINE
Payer: MEDICARE

## 2023-07-21 DIAGNOSIS — R29.898 RIGHT ARM WEAKNESS: ICD-10-CM

## 2023-07-21 DIAGNOSIS — R60.0 EDEMA OF RIGHT FOREARM: ICD-10-CM

## 2023-07-21 DIAGNOSIS — M21.331 WRIST DROP, RIGHT: Primary | ICD-10-CM

## 2023-07-21 LAB
ALBUMIN SERPL-MCNC: 3.4 G/DL (ref 3.4–5)
ALBUMIN/GLOB SERPL: 0.9 {RATIO} (ref 1–2)
ALP LIVER SERPL-CCNC: 72 U/L
ALT SERPL-CCNC: 24 U/L
ANION GAP SERPL CALC-SCNC: 6 MMOL/L (ref 0–18)
AST SERPL-CCNC: 20 U/L (ref 15–37)
BASOPHILS # BLD AUTO: 0.04 X10(3) UL (ref 0–0.2)
BASOPHILS NFR BLD AUTO: 0.5 %
BILIRUB SERPL-MCNC: 0.2 MG/DL (ref 0.1–2)
BUN BLD-MCNC: 14 MG/DL (ref 7–18)
CALCIUM BLD-MCNC: 8.7 MG/DL (ref 8.5–10.1)
CHLORIDE SERPL-SCNC: 113 MMOL/L (ref 98–112)
CO2 SERPL-SCNC: 24 MMOL/L (ref 21–32)
CREAT BLD-MCNC: 0.83 MG/DL
EGFRCR SERPLBLD CKD-EPI 2021: 76 ML/MIN/1.73M2 (ref 60–?)
EOSINOPHIL # BLD AUTO: 0.11 X10(3) UL (ref 0–0.7)
EOSINOPHIL NFR BLD AUTO: 1.5 %
ERYTHROCYTE [DISTWIDTH] IN BLOOD BY AUTOMATED COUNT: 14.6 %
GLOBULIN PLAS-MCNC: 3.7 G/DL (ref 2.8–4.4)
GLUCOSE BLD-MCNC: 96 MG/DL (ref 70–99)
HCT VFR BLD AUTO: 34.2 %
HGB BLD-MCNC: 11.4 G/DL
IMM GRANULOCYTES # BLD AUTO: 0.01 X10(3) UL (ref 0–1)
IMM GRANULOCYTES NFR BLD: 0.1 %
INR BLD: 3.15 (ref 0.85–1.16)
LYMPHOCYTES # BLD AUTO: 2.12 X10(3) UL (ref 1–4)
LYMPHOCYTES NFR BLD AUTO: 28.3 %
MCH RBC QN AUTO: 26.4 PG (ref 26–34)
MCHC RBC AUTO-ENTMCNC: 33.3 G/DL (ref 31–37)
MCV RBC AUTO: 79.2 FL
MONOCYTES # BLD AUTO: 0.61 X10(3) UL (ref 0.1–1)
MONOCYTES NFR BLD AUTO: 8.1 %
NEUTROPHILS # BLD AUTO: 4.6 X10 (3) UL (ref 1.5–7.7)
NEUTROPHILS # BLD AUTO: 4.6 X10(3) UL (ref 1.5–7.7)
NEUTROPHILS NFR BLD AUTO: 61.5 %
OSMOLALITY SERPL CALC.SUM OF ELEC: 296 MOSM/KG (ref 275–295)
PLATELET # BLD AUTO: 204 10(3)UL (ref 150–450)
POTASSIUM SERPL-SCNC: 3.4 MMOL/L (ref 3.5–5.1)
PROT SERPL-MCNC: 7.1 G/DL (ref 6.4–8.2)
PROTHROMBIN TIME: 31.9 SECONDS (ref 11.6–14.8)
RBC # BLD AUTO: 4.32 X10(6)UL
SODIUM SERPL-SCNC: 143 MMOL/L (ref 136–145)
WBC # BLD AUTO: 7.5 X10(3) UL (ref 4–11)

## 2023-07-21 PROCEDURE — 93971 EXTREMITY STUDY: CPT | Performed by: EMERGENCY MEDICINE

## 2023-07-21 RX ORDER — POLYETHYLENE GLYCOL 3350 17 G/17G
17 POWDER, FOR SOLUTION ORAL DAILY PRN
Status: DISCONTINUED | OUTPATIENT
Start: 2023-07-21 | End: 2023-07-24

## 2023-07-21 RX ORDER — ONDANSETRON 2 MG/ML
4 INJECTION INTRAMUSCULAR; INTRAVENOUS EVERY 6 HOURS PRN
Status: DISCONTINUED | OUTPATIENT
Start: 2023-07-21 | End: 2023-07-21

## 2023-07-21 RX ORDER — CALCIUM CARBONATE 500 MG/1
TABLET, CHEWABLE ORAL 2 TIMES DAILY PRN
Status: DISCONTINUED | OUTPATIENT
Start: 2023-07-21 | End: 2023-07-24

## 2023-07-21 RX ORDER — ROSUVASTATIN CALCIUM 5 MG/1
5 TABLET, COATED ORAL NIGHTLY
Status: DISCONTINUED | OUTPATIENT
Start: 2023-07-21 | End: 2023-07-24

## 2023-07-21 RX ORDER — MYCOPHENOLATE MOFETIL 250 MG/1
1500 CAPSULE ORAL 2 TIMES DAILY
Status: DISCONTINUED | OUTPATIENT
Start: 2023-07-21 | End: 2023-07-24

## 2023-07-21 RX ORDER — SENNOSIDES 8.6 MG
17.2 TABLET ORAL NIGHTLY PRN
Status: DISCONTINUED | OUTPATIENT
Start: 2023-07-21 | End: 2023-07-24

## 2023-07-21 RX ORDER — CALCIUM CARBONATE 500 MG/1
500 TABLET, CHEWABLE ORAL 3 TIMES DAILY PRN
Status: DISCONTINUED | OUTPATIENT
Start: 2023-07-21 | End: 2023-07-21

## 2023-07-21 RX ORDER — HYDROCODONE BITARTRATE AND ACETAMINOPHEN 5; 325 MG/1; MG/1
1 TABLET ORAL EVERY 8 HOURS PRN
Status: DISCONTINUED | OUTPATIENT
Start: 2023-07-21 | End: 2023-07-24

## 2023-07-21 RX ORDER — FAMOTIDINE 20 MG/1
40 TABLET, FILM COATED ORAL DAILY
Status: DISCONTINUED | OUTPATIENT
Start: 2023-07-21 | End: 2023-07-24

## 2023-07-21 RX ORDER — MORPHINE SULFATE 2 MG/ML
2 INJECTION, SOLUTION INTRAMUSCULAR; INTRAVENOUS EVERY 2 HOUR PRN
Status: DISCONTINUED | OUTPATIENT
Start: 2023-07-21 | End: 2023-07-24

## 2023-07-21 RX ORDER — PANTOPRAZOLE SODIUM 40 MG/1
40 TABLET, DELAYED RELEASE ORAL
Status: DISCONTINUED | OUTPATIENT
Start: 2023-07-21 | End: 2023-07-24

## 2023-07-21 RX ORDER — HYDROCODONE BITARTRATE AND ACETAMINOPHEN 5; 325 MG/1; MG/1
2 TABLET ORAL EVERY 4 HOURS PRN
Status: DISCONTINUED | OUTPATIENT
Start: 2023-07-21 | End: 2023-07-24

## 2023-07-21 RX ORDER — POTASSIUM CHLORIDE 20 MEQ/1
40 TABLET, EXTENDED RELEASE ORAL EVERY 4 HOURS
Status: DISCONTINUED | OUTPATIENT
Start: 2023-07-21 | End: 2023-07-21

## 2023-07-21 RX ORDER — HYDRALAZINE HYDROCHLORIDE 50 MG/1
100 TABLET, FILM COATED ORAL 3 TIMES DAILY
Status: DISCONTINUED | OUTPATIENT
Start: 2023-07-21 | End: 2023-07-24

## 2023-07-21 RX ORDER — LOSARTAN POTASSIUM 50 MG/1
50 TABLET ORAL 2 TIMES DAILY
Status: DISCONTINUED | OUTPATIENT
Start: 2023-07-21 | End: 2023-07-24

## 2023-07-21 RX ORDER — FUROSEMIDE 10 MG/ML
20 INJECTION INTRAMUSCULAR; INTRAVENOUS ONCE
Status: COMPLETED | OUTPATIENT
Start: 2023-07-21 | End: 2023-07-21

## 2023-07-21 RX ORDER — BISACODYL 10 MG
10 SUPPOSITORY, RECTAL RECTAL
Status: DISCONTINUED | OUTPATIENT
Start: 2023-07-21 | End: 2023-07-24

## 2023-07-21 RX ORDER — MORPHINE SULFATE 2 MG/ML
1 INJECTION, SOLUTION INTRAMUSCULAR; INTRAVENOUS EVERY 2 HOUR PRN
Status: DISCONTINUED | OUTPATIENT
Start: 2023-07-21 | End: 2023-07-24

## 2023-07-21 RX ORDER — POTASSIUM CHLORIDE 1.5 G/1.58G
40 POWDER, FOR SOLUTION ORAL ONCE
Status: COMPLETED | OUTPATIENT
Start: 2023-07-21 | End: 2023-07-21

## 2023-07-21 RX ORDER — CARVEDILOL 12.5 MG/1
12.5 TABLET ORAL 2 TIMES DAILY WITH MEALS
Status: DISCONTINUED | OUTPATIENT
Start: 2023-07-21 | End: 2023-07-24

## 2023-07-21 RX ORDER — OXYBUTYNIN CHLORIDE 10 MG/1
10 TABLET, EXTENDED RELEASE ORAL DAILY
Status: DISCONTINUED | OUTPATIENT
Start: 2023-07-22 | End: 2023-07-24

## 2023-07-21 RX ORDER — ENEMA 19; 7 G/133ML; G/133ML
1 ENEMA RECTAL ONCE AS NEEDED
Status: DISCONTINUED | OUTPATIENT
Start: 2023-07-21 | End: 2023-07-24

## 2023-07-21 RX ORDER — ACETAMINOPHEN 325 MG/1
650 TABLET ORAL EVERY 4 HOURS PRN
Status: DISCONTINUED | OUTPATIENT
Start: 2023-07-21 | End: 2023-07-24

## 2023-07-21 RX ORDER — METOCLOPRAMIDE HYDROCHLORIDE 5 MG/ML
10 INJECTION INTRAMUSCULAR; INTRAVENOUS EVERY 8 HOURS PRN
Status: DISCONTINUED | OUTPATIENT
Start: 2023-07-21 | End: 2023-07-24

## 2023-07-21 RX ORDER — HYDROCODONE BITARTRATE AND ACETAMINOPHEN 5; 325 MG/1; MG/1
1 TABLET ORAL EVERY 4 HOURS PRN
Status: DISCONTINUED | OUTPATIENT
Start: 2023-07-21 | End: 2023-07-24

## 2023-07-21 RX ORDER — MORPHINE SULFATE 4 MG/ML
4 INJECTION, SOLUTION INTRAMUSCULAR; INTRAVENOUS EVERY 2 HOUR PRN
Status: DISCONTINUED | OUTPATIENT
Start: 2023-07-21 | End: 2023-07-24

## 2023-07-21 NOTE — ED INITIAL ASSESSMENT (HPI)
Pt here due to acute worsening R forearm swelling. + wrist drop. Faint pulses. Forearm taught. Concerned for compartment syndrome.

## 2023-07-21 NOTE — PLAN OF CARE
Patient A&Ox4, VSS on RA, . Patient reports symptoms of acid reflex and discomfort. MD paged for treatment, awaiting response. RUE and hand swollen and tender to touch. Patient denies numbness or tingling. Patient unable to extend wrist from flexed position with manipulation. Left upper chest port a cath accessed in ED. No blood return noted on first attempt. Blood return gained during second attempt. No mercado needle exchange necessary. Plan for patient to elevate RUE on pillow. No ice or heat therapy at this time per ortho. NPO and midnight for ortho re eval tomorrow morning. Surgical intervention pending evaluation. Plan of care reviewed with patient. Patient demonstrates understanding.

## 2023-07-21 NOTE — CONSULTS
[facility]    PATIENT'S NAME: Peter Nichols   ATTENDING PHYSICIAN: Joana Sutton MD   CONSULTING PHYSICIAN: Nicholas Beckman M.D. PATIENT ACCOUNT#:   [de-identified]    LOCATION:  [address] [place] [address]  MEDICAL RECORD #:   [id number]       YOB: 1950  ADMISSION DATE:       07/21/2023      CONSULT DATE:  07/21/2023    REPORT OF CONSULTATION    REASON FOR CONSULTATION:  Referred by Dr. Giles Eduardo regarding right forearm swelling. HISTORY OF PRESENT ILLNESS:  This is a 79-year-old female who presents with persistent swelling of the right forearm since onset on 07/05/2023. She has a history of lymphedema in the right upper extremity due to a history of a mastectomy. She does have diffuse pain at the arm and forearm. She denies numbness or tingling. She has some spasm in the hand and arm. She states that the pain and function have not changed or worsened significantly over the last several days, but due to persistence she was seen in the office today and sent to the emergency room. Her pain is controlled when she is at rest.    PAST MEDICAL HISTORY:  Arrhythmia; history of blindness; sickle-cell trait; history of breast cancer, status post right mastectomy; hepatitis; hypertension; scleroderma; history of lupus; Sjogren's disease; Parkinson's disease. MEDICATIONS:  See list in the chart. ALLERGIES:  See list in the chart. FAMILY HISTORY:  Charted and reviewed. SOCIAL HISTORY:  Charted and reviewed. REVIEW OF SYSTEMS:  Charted and reviewed. PHYSICAL EXAMINATION:    GENERAL:  Alert, elderly black female resting in bed. Currently in no acute distress. EXTREMITIES:  Exam of the right upper extremity:  There is tenderness and swelling at the forearm as well as the medial aspect of her arm and into her lateral right chest.  No erythema. There is spasm with flexion of the wrist and fingers, but the patient is able to dorsiflex the wrist when she is not having forearm spasm. Sensation to light touch is intact throughout. Palpable radial pulse. Good capillary refill. Passive range of motion at the wrist and fingers is with minimal discomfort. Ultrasound of the right upper extremity performed today demonstrates a nonocclusive thrombus in the median cubital vein; other veins are patent. IMPRESSION:  Right arm swelling with history of lymphedema, muscle spasm, but neurologically intact. PLAN:  I discussed the possible diagnoses with the patient. I do believe, since there is no acute change, sensation and motor are intact although there is muscle spasm, it is worth elevating overnight to see if there is clinical improvement. Again the spasm at the forearm has been going on for approximately 9 days, so this is not an acute finding. Her pain is controlled while she is at rest.  She understands that a fasciotomy may be necessary if symptoms worsen. I will check on her first thing tomorrow a.m. to re-evaluate. Thank you for the consultation.     Dictated By Angelica Wright M.D.  d: 07/21/2023 16:24:17  t: 07/21/2023 16:35:11  [facility] [id number]  JL/

## 2023-07-22 LAB
ANION GAP SERPL CALC-SCNC: 3 MMOL/L (ref 0–18)
BASOPHILS # BLD AUTO: 0.04 X10(3) UL (ref 0–0.2)
BASOPHILS NFR BLD AUTO: 0.5 %
BUN BLD-MCNC: 16 MG/DL (ref 7–18)
C3 SERPL-MCNC: 155 MG/DL (ref 90–180)
C4 SERPL-MCNC: 43.1 MG/DL (ref 10–40)
CALCIUM BLD-MCNC: 7.5 MG/DL (ref 8.5–10.1)
CHLORIDE SERPL-SCNC: 118 MMOL/L (ref 98–112)
CO2 SERPL-SCNC: 22 MMOL/L (ref 21–32)
CREAT BLD-MCNC: 0.73 MG/DL
CRP SERPL-MCNC: 1.79 MG/DL (ref ?–0.3)
EGFRCR SERPLBLD CKD-EPI 2021: 88 ML/MIN/1.73M2 (ref 60–?)
EOSINOPHIL # BLD AUTO: 0.16 X10(3) UL (ref 0–0.7)
EOSINOPHIL NFR BLD AUTO: 2.1 %
ERYTHROCYTE [DISTWIDTH] IN BLOOD BY AUTOMATED COUNT: 14.6 %
ERYTHROCYTE [SEDIMENTATION RATE] IN BLOOD: 38 MM/HR
GLUCOSE BLD-MCNC: 97 MG/DL (ref 70–99)
HCT VFR BLD AUTO: 32 %
HGB BLD-MCNC: 10.5 G/DL
IMM GRANULOCYTES # BLD AUTO: 0.02 X10(3) UL (ref 0–1)
IMM GRANULOCYTES NFR BLD: 0.3 %
INR BLD: 2.9 (ref 0.85–1.16)
LYMPHOCYTES # BLD AUTO: 1.92 X10(3) UL (ref 1–4)
LYMPHOCYTES NFR BLD AUTO: 25.3 %
MCH RBC QN AUTO: 26.4 PG (ref 26–34)
MCHC RBC AUTO-ENTMCNC: 32.8 G/DL (ref 31–37)
MCV RBC AUTO: 80.6 FL
MONOCYTES # BLD AUTO: 0.74 X10(3) UL (ref 0.1–1)
MONOCYTES NFR BLD AUTO: 9.7 %
NEUTROPHILS # BLD AUTO: 4.71 X10 (3) UL (ref 1.5–7.7)
NEUTROPHILS # BLD AUTO: 4.71 X10(3) UL (ref 1.5–7.7)
NEUTROPHILS NFR BLD AUTO: 62.1 %
OSMOLALITY SERPL CALC.SUM OF ELEC: 297 MOSM/KG (ref 275–295)
PLATELET # BLD AUTO: 162 10(3)UL (ref 150–450)
POTASSIUM SERPL-SCNC: 3.8 MMOL/L (ref 3.5–5.1)
POTASSIUM SERPL-SCNC: 4 MMOL/L (ref 3.5–5.1)
PROTHROMBIN TIME: 29.9 SECONDS (ref 11.6–14.8)
RBC # BLD AUTO: 3.97 X10(6)UL
SODIUM SERPL-SCNC: 143 MMOL/L (ref 136–145)
WBC # BLD AUTO: 7.6 X10(3) UL (ref 4–11)

## 2023-07-22 RX ORDER — WARFARIN SODIUM 5 MG/1
5 TABLET ORAL
Status: COMPLETED | OUTPATIENT
Start: 2023-07-22 | End: 2023-07-22

## 2023-07-22 NOTE — PLAN OF CARE
Pt Aox4, RA, . Coumadin to resume today. R Arm swollen, elevated on pillows, heat applied- ok per ortho. Up to bathroom with standby assist. Tia Dock to L chest, IVF TKO- blood return from port very slow. Neuro to see. Further plan pending.

## 2023-07-22 NOTE — DISCHARGE INSTRUCTIONS
Will need INR checked on Monday. Lovenox 40 daily along with Coumdin dosing until INR >2. Right wrist brace: wear day/night as tolerated to maintain neutral positioning of wrist.  Remove for skin care and hygiene. Right upper extremity compression: Start foam bandage at base of knuckles and wrap around the arm until you get to right under the axilla. Place pre-cut sleeve on top of foam bandage. If you have extra material left, fold it over at the hand/wrist but not at the arm pit. Wear 23hrs/day with removal for hygiene, skin checks and moisturizing. Remove if compression is causing increased pain, redness, or increased swelling.    -Elevate right arm as much as possible. Avoid extreme temperature changes. - Schedule an appointment with the Lymphedema [facility]. I have placed an order and should be provided with your discharge paperwork. Pleasure to meet you.   If you have any questions, please contact the Rehab Department at [phone] and ask for Renetta-Occupational therapist.

## 2023-07-22 NOTE — PROGRESS NOTES
[facility] Dosing Service  Warfarin (Coumadin) Initial Dosing    Shireen Fragoso is a 81year old patient with a history of hypercoagulable state on outpatient coumadin. Pharmacy has been consulted to dose warfarin (COUMADIN) by Dr. Antwan Govea. Based on this indication, goal INR is 2-3. Pertinent Patient Medical History:  hypercoagulable state, lupus  Potential Drug Interactions:  crestor, protonix    Latest INR:   Recent Labs     07/22/23  0903   INR 2.90*       Other Anticoagulants:  none  Home regimen (if applicable):  8.4LD Thurs/Tues and 5mg all other days (per anticoagulation clinic notes 6/29/23  Date/Time last dose was given (if applicable): prior to admission    Based on above -  1. For today, Give warfarin (COUMADIN) 5 mg at 2100 tonight    2. PT/INR ordered daily while on warfarin    3. Pharmacy will continue to follow. We appreciate the opportunity to assist in the care of this patient.     Jeannie Andres, PharmD  7/22/2023  11:59 AM

## 2023-07-22 NOTE — PLAN OF CARE
Patient A & O x4. VSS, on RA. C/o mod pain, declining any pain medication. RUE swollen, elevated on pillow. Denies numbness/tingling. NPO MN. Port to left chest. Safety measures in place. Instructed to use call light.

## 2023-07-23 PROBLEM — R29.898 RIGHT ARM WEAKNESS: Status: ACTIVE | Noted: 2023-07-23

## 2023-07-23 LAB
INR BLD: 2.76 (ref 0.85–1.16)
PROTHROMBIN TIME: 28.8 SECONDS (ref 11.6–14.8)

## 2023-07-23 PROCEDURE — 99223 1ST HOSP IP/OBS HIGH 75: CPT | Performed by: OTHER

## 2023-07-23 RX ORDER — WARFARIN SODIUM 5 MG/1
5 TABLET ORAL
Status: COMPLETED | OUTPATIENT
Start: 2023-07-23 | End: 2023-07-23

## 2023-07-23 NOTE — PLAN OF CARE
Patient alert and oriented x4. Room air. Coumadin restarted. Cardiac electrolyte protocol in place. Last bowel movement 7/23. Arm elevated above heart with heat packs as needed. Up with assist and walker. Port care completed with education provided to lay flat when labs are drawn to facilitate appropriate success of draws. Right arm restrictions in place. Neuro consult completed. OT consult pending for 7/24 with discharge orders in place once consult completed. Plan is for patient to discharge home on 7/24 with spouse.

## 2023-07-23 NOTE — CM/SW NOTE
Received per protocol PHQ4 order. [address] resources placed on AVS. Discussed with RN. Order acknowledged. SW will continue to follow for plan of care changes and remain available for any additional DC needs or concerns.      Marisol Mckeon MSW, [facility]  Discharge Planner   [id number]

## 2023-07-23 NOTE — PLAN OF CARE
Patient A & O x4. VSS, on RA. C/o mod pain, declining any pain medication. RUE swollen, elevated on pillow. Denies numbness/tingling. Hot packs as needed. Port to left chest. Safety measures in place. Instructed to use call light. 0500: Swelling noted to left side of neck above accessed port. 0.9 was running at 15ml/hr, was stopped. Difficulty with flushing port and drawing back blood. Patient denies increased pain with swelling at neck. IM MD notified, awaiting response.

## 2023-07-24 VITALS
OXYGEN SATURATION: 98 % | WEIGHT: 144 LBS | HEIGHT: 60 IN | SYSTOLIC BLOOD PRESSURE: 144 MMHG | TEMPERATURE: 98 F | HEART RATE: 61 BPM | BODY MASS INDEX: 28.27 KG/M2 | RESPIRATION RATE: 20 BRPM | DIASTOLIC BLOOD PRESSURE: 71 MMHG

## 2023-07-24 LAB
INR BLD: 2.69 (ref 0.85–1.16)
PROTHROMBIN TIME: 28.3 SECONDS (ref 11.6–14.8)

## 2023-07-24 RX ORDER — WARFARIN SODIUM 5 MG/1
5 TABLET ORAL
Status: DISCONTINUED | OUTPATIENT
Start: 2023-07-24 | End: 2023-07-24

## 2023-07-24 NOTE — PLAN OF CARE
Patient A&O X4 on RA. Seizure precautions maintained. RUE precautions. Left chest port accessed. VSS, /IS. Refusing SCDs, Coumadin. Voiding freely, LBM 7/23. Swelling to right arm- elevating on pillows as tolerated. Heat packs given. Denies n/t. PT/OT. Up min assist w/ walker. Reminded to use call light. Plan to dc home when cleared. OT to eval today for lymphedema.

## 2023-07-24 NOTE — PLAN OF CARE
Patient is Aox4, VSS on RA, denies CP / SOB, denies N/T to RUE, RUE elevated and warm packs applied, OT placing lymph wrap to arm, patient up stand by assist w/ walker, voiding freely, call light and belonings within reach, plan for discharge later today.

## 2023-07-24 NOTE — PROGRESS NOTES
[facility] Dosing Service  Warfarin (Coumadin) Subsequent Dosing    Carmel Fragoso is a 81year old patient for whom pharmacy is dosing warfarin (Coumadin). Goal INR is 2-3    Recent Labs   Lab 07/21/23  1116 07/22/23  0903 07/23/23  0626 07/24/23  0601   INR 3.15* 2.90* 2.76* 2.69*       Consulted by:  Dr. Nichole Newton  Indication:  hypercoagulable state (stroke like symptoms) on chronic coumadin   Potential Drug Interactions:  none specific  Other Anticoagulants:  none  Home regimen (if applicable): 4.4XR Thurs/Tues and 5mg all other days (per anticoagulation clinic notes 6/29/23)     Inpatient Dosing History:   Date 7/22 7/23 7/24    INR 2.9 2.76  2.69   Coumadin dose 5mg  5 mg         Based on above -  1. For today, Give warfarin (COUMADIN) 5 mg at 2100 tonight  2   PT/INR ordered daily while on warfarin  3. Pharmacy will continue to follow. We appreciate the opportunity to assist in the care of this patient.     Warden Brady, PharmD  7/24/2023  7:25 AM

## 2023-12-13 ENCOUNTER — HOSPITAL ENCOUNTER (OUTPATIENT)
Dept: MAMMOGRAPHY | Facility: HOSPITAL | Age: 73
Discharge: HOME | End: 2023-12-13
Attending: SURGERY
Payer: MEDICARE

## 2023-12-13 DIAGNOSIS — Z12.31 SCREENING MAMMOGRAM FOR BREAST CANCER: ICD-10-CM

## 2023-12-13 PROCEDURE — 77063 BREAST TOMOSYNTHESIS BI: CPT | Performed by: SURGERY

## 2023-12-13 PROCEDURE — 77067 SCR MAMMO BI INCL CAD: CPT | Performed by: SURGERY

## 2024-03-21 PROBLEM — J06.9 VIRAL UPPER RESPIRATORY TRACT INFECTION: Status: RESOLVED | Noted: 2022-11-03 | Resolved: 2024-03-21

## 2024-12-18 ENCOUNTER — HOSPITAL ENCOUNTER (OUTPATIENT)
Dept: MAMMOGRAPHY | Facility: HOSPITAL | Age: 74
Discharge: HOME | End: 2024-12-18
Attending: SURGERY
Payer: MEDICARE

## 2024-12-18 DIAGNOSIS — D05.11 DUCTAL CARCINOMA IN SITU (DCIS) OF RIGHT BREAST: ICD-10-CM

## 2024-12-18 PROCEDURE — 77061 BREAST TOMOSYNTHESIS UNI: CPT | Performed by: SURGERY

## 2024-12-18 PROCEDURE — 77065 DX MAMMO INCL CAD UNI: CPT | Performed by: SURGERY

## 2025-03-20 NOTE — ED QUICK NOTES
Orders for admission, patient is aware of plan and ready to go upstairs. Any questions, please call ED RN [address] at extension [phone].      Patient Covid vaccination status: Fully vaccinated     COVID Test Ordered in ED: None    COVID Suspicion at Admission: N/A    Running Infusions:  None    Mental Status/LOC at time of transport: A&Ox4    Other pertinent information:   CIWA score: N/A   NIH score:  N/A Loraine

## 2025-05-13 ENCOUNTER — HOSPITAL ENCOUNTER (EMERGENCY)
Facility: HOSPITAL | Age: 75
Discharge: HOME | End: 2025-05-13
Attending: EMERGENCY MEDICINE
Payer: MEDICARE

## 2025-05-13 ENCOUNTER — APPOINTMENT (OUTPATIENT)
Dept: CT IMAGING | Facility: HOSPITAL | Age: 75
End: 2025-05-13
Attending: EMERGENCY MEDICINE
Payer: MEDICARE

## 2025-05-13 VITALS
OXYGEN SATURATION: 100 % | DIASTOLIC BLOOD PRESSURE: 83 MMHG | HEART RATE: 62 BPM | SYSTOLIC BLOOD PRESSURE: 148 MMHG | TEMPERATURE: 99 F | RESPIRATION RATE: 26 BRPM

## 2025-05-13 DIAGNOSIS — J04.0 ACUTE LARYNGITIS: Primary | ICD-10-CM

## 2025-05-13 LAB
ALBUMIN SERPL-MCNC: 4.8 G/DL (ref 3.2–4.8)
ALBUMIN/GLOB SERPL: 1.8 {RATIO} (ref 1–2)
ALP LIVER SERPL-CCNC: 62 U/L (ref 55–142)
ALT SERPL-CCNC: 15 U/L (ref 10–49)
ANION GAP SERPL CALC-SCNC: 7 MMOL/L (ref 0–18)
AST SERPL-CCNC: 21 U/L (ref ?–34)
BASOPHILS # BLD AUTO: 0.04 X10(3) UL (ref 0–0.2)
BASOPHILS NFR BLD AUTO: 0.5 %
BILIRUB SERPL-MCNC: 0.4 MG/DL (ref 0.2–1.1)
BILIRUB UR QL STRIP.AUTO: NEGATIVE
BUN BLD-MCNC: 12 MG/DL (ref 9–23)
CALCIUM BLD-MCNC: 9.5 MG/DL (ref 8.7–10.6)
CHLORIDE SERPL-SCNC: 108 MMOL/L (ref 98–112)
CLARITY UR REFRACT.AUTO: CLEAR
CO2 SERPL-SCNC: 28 MMOL/L (ref 21–32)
CREAT BLD-MCNC: 0.83 MG/DL (ref 0.55–1.02)
CRP SERPL-MCNC: 2.5 MG/DL (ref ?–0.5)
EGFRCR SERPLBLD CKD-EPI 2021: 75 ML/MIN/1.73M2 (ref 60–?)
EOSINOPHIL # BLD AUTO: 0.06 X10(3) UL (ref 0–0.7)
EOSINOPHIL NFR BLD AUTO: 0.8 %
ERYTHROCYTE [DISTWIDTH] IN BLOOD BY AUTOMATED COUNT: 14.2 %
ERYTHROCYTE [SEDIMENTATION RATE] IN BLOOD: 55 MM/HR (ref 0–30)
GLOBULIN PLAS-MCNC: 2.7 G/DL (ref 2–3.5)
GLUCOSE BLD-MCNC: 102 MG/DL (ref 70–99)
GLUCOSE UR STRIP.AUTO-MCNC: NORMAL MG/DL
HCT VFR BLD AUTO: 34.7 % (ref 35–48)
HETEROPH AB SER QL: NEGATIVE
HGB BLD-MCNC: 11.3 G/DL (ref 12–16)
IMM GRANULOCYTES # BLD AUTO: 0.03 X10(3) UL (ref 0–1)
IMM GRANULOCYTES NFR BLD: 0.4 %
INR BLD: 1.84 (ref 0.8–1.2)
KETONES UR STRIP.AUTO-MCNC: NEGATIVE MG/DL
LEUKOCYTE ESTERASE UR QL STRIP.AUTO: 250
LYMPHOCYTES # BLD AUTO: 1.34 X10(3) UL (ref 1–4)
LYMPHOCYTES NFR BLD AUTO: 17 %
MCH RBC QN AUTO: 26.2 PG (ref 26–34)
MCHC RBC AUTO-ENTMCNC: 32.6 G/DL (ref 31–37)
MCV RBC AUTO: 80.3 FL (ref 80–100)
MONOCYTES # BLD AUTO: 0.48 X10(3) UL (ref 0.1–1)
MONOCYTES NFR BLD AUTO: 6.1 %
NEUTROPHILS # BLD AUTO: 5.95 X10 (3) UL (ref 1.5–7.7)
NEUTROPHILS # BLD AUTO: 5.95 X10(3) UL (ref 1.5–7.7)
NEUTROPHILS NFR BLD AUTO: 75.2 %
NITRITE UR QL STRIP.AUTO: NEGATIVE
OSMOLALITY SERPL CALC.SUM OF ELEC: 296 MOSM/KG (ref 275–295)
PH UR STRIP.AUTO: 6 [PH] (ref 5–8)
PLATELET # BLD AUTO: 195 10(3)UL (ref 150–450)
POTASSIUM SERPL-SCNC: 3.7 MMOL/L (ref 3.5–5.1)
PROT SERPL-MCNC: 7.5 G/DL (ref 5.7–8.2)
PROT UR STRIP.AUTO-MCNC: 30 MG/DL
PROTHROMBIN TIME: 21.4 SECONDS (ref 11.6–14.8)
RBC # BLD AUTO: 4.32 X10(6)UL (ref 3.8–5.3)
SODIUM SERPL-SCNC: 143 MMOL/L (ref 136–145)
SP GR UR STRIP.AUTO: 1.02 (ref 1–1.03)
UROBILINOGEN UR STRIP.AUTO-MCNC: NORMAL MG/DL
WBC # BLD AUTO: 7.9 X10(3) UL (ref 4–11)

## 2025-05-13 PROCEDURE — 99285 EMERGENCY DEPT VISIT HI MDM: CPT

## 2025-05-13 PROCEDURE — 96361 HYDRATE IV INFUSION ADD-ON: CPT

## 2025-05-13 PROCEDURE — 85610 PROTHROMBIN TIME: CPT | Performed by: EMERGENCY MEDICINE

## 2025-05-13 PROCEDURE — 86140 C-REACTIVE PROTEIN: CPT | Performed by: EMERGENCY MEDICINE

## 2025-05-13 PROCEDURE — 80053 COMPREHEN METABOLIC PANEL: CPT | Performed by: EMERGENCY MEDICINE

## 2025-05-13 PROCEDURE — 86403 PARTICLE AGGLUT ANTBDY SCRN: CPT | Performed by: EMERGENCY MEDICINE

## 2025-05-13 PROCEDURE — 99284 EMERGENCY DEPT VISIT MOD MDM: CPT

## 2025-05-13 PROCEDURE — 85025 COMPLETE CBC W/AUTO DIFF WBC: CPT | Performed by: EMERGENCY MEDICINE

## 2025-05-13 PROCEDURE — 85652 RBC SED RATE AUTOMATED: CPT | Performed by: EMERGENCY MEDICINE

## 2025-05-13 PROCEDURE — 87086 URINE CULTURE/COLONY COUNT: CPT | Performed by: EMERGENCY MEDICINE

## 2025-05-13 PROCEDURE — 86665 EPSTEIN-BARR CAPSID VCA: CPT | Performed by: EMERGENCY MEDICINE

## 2025-05-13 PROCEDURE — 70490 CT SOFT TISSUE NECK W/O DYE: CPT | Performed by: EMERGENCY MEDICINE

## 2025-05-13 PROCEDURE — 69209 REMOVE IMPACTED EAR WAX UNI: CPT

## 2025-05-13 PROCEDURE — 86664 EPSTEIN-BARR NUCLEAR ANTIGEN: CPT | Performed by: EMERGENCY MEDICINE

## 2025-05-13 PROCEDURE — 96374 THER/PROPH/DIAG INJ IV PUSH: CPT

## 2025-05-13 PROCEDURE — 81001 URINALYSIS AUTO W/SCOPE: CPT | Performed by: EMERGENCY MEDICINE

## 2025-05-13 RX ORDER — DEXAMETHASONE SODIUM PHOSPHATE 10 MG/ML
10 INJECTION, SOLUTION INTRAMUSCULAR; INTRAVENOUS ONCE
Status: COMPLETED | OUTPATIENT
Start: 2025-05-13 | End: 2025-05-13

## 2025-05-13 RX ORDER — LIDOCAINE HYDROCHLORIDE 20 MG/ML
10 SOLUTION OROPHARYNGEAL ONCE
Status: COMPLETED | OUTPATIENT
Start: 2025-05-13 | End: 2025-05-13

## 2025-05-13 RX ORDER — PREDNISONE 5 MG/ML
20 SOLUTION ORAL DAILY
COMMUNITY
Start: 2025-05-12 | End: 2025-05-17

## 2025-05-13 RX ORDER — LIDOCAINE HYDROCHLORIDE 20 MG/ML
5 SOLUTION OROPHARYNGEAL
Qty: 1 EACH | Refills: 0 | Status: SHIPPED | OUTPATIENT
Start: 2025-05-13

## 2025-05-13 RX ORDER — METHYLPREDNISOLONE 4 MG/1
4 TABLET ORAL AS DIRECTED
COMMUNITY
Start: 2025-05-12

## 2025-05-13 NOTE — ED PROVIDER NOTES
Patient Seen in: [facility] Emergency Department      History     Chief Complaint   Patient presents with    Sore Throat     Stated Complaint: sore throat with hoarse voice x 4 days    Subjective:   HPI  History of Present Illness            Patient presents with a sore throat and hoarse voice.  She has pain with speaking so most of the history she gives written on paper.  She states that she developed a sore throat Saturday but it quickly worsened.  Sunday she lost her voice and at that point could tolerate a soft diet.  She was seen yesterday at immediate care and had a negative respiratory viral panel and strep test.  She was also having ear pain and was found to have a cerumen impaction on the right side.  They were unable to clear it so she was encouraged to use Debrox solution.  She was prescribed prednisone but was unable to tolerate it today.  She cannot take her regular medication either and so in consultation with her primary they recommended she come here.  She does not feel particularly short of breath.  She has not had a fever.  She did develop some dysuria today as well.  Review of her records reveals a visit in 2022 to the ER with somewhat similar symptoms.  This was following a viral illness and she had shortness of breath at that time as well but her workup came back negative including direct laryngoscopy by ENT.      Objective:     Past Medical History:    Anemia    Breast cancer (HCC)    Calculus of kidney    Depression    Esophageal reflux    Fibromyalgia    Hepatitis    HTN (hypertension)    Migraines    Neuropathy    Osteoarthritis    Seizure disorder (HCC)    Sickle cell trait    Sjogren's disease (HCC)    SLE (systemic lupus erythematosus) (HCC)    Stroke (HCC)    Vitamin D deficiency    VTE (venous thromboembolism)              Past Surgical History:   Procedure Laterality Date    Arthroscopy of joint unlisted Left     knee    Chemotherapy  1991    Colonoscopy N/A 08/02/2018     Procedure: COLONOSCOPY;  Surgeon: Carlos Joshi MD;  Location:  ENDOSCOPY    Colonoscopy,diagnostic  08/08/2011    Dr. Wesley, wnl    Colonoscopy,diagnostic  08/02/2018    diverticulosis, hemorrhoids    Cystourethroscopy  12/09/2010    Performed by OSORIO LIN at [facility]    D & c      x 2    Fragmenting of kidney stone  12/09/2010    Performed by OSORIO LIN at [facility]    Hysterectomy      Knee replacement surgery Left 11/24/2017    Laparoscopy,diagnostic      Bull localization wire 1 site left (cpt=19281)  1991,1993    benign    Mastec,mod radical      1991 for R breast CA    Mastectomy right  1991    Mri breast biopsy w/ clip 1 site (cpt=19085)  2011    Needle biopsy left  11/2011    MRI bx - benign    Other      urethra dilated    Other Left 1980    Biopsy for scleroderma    Other surgical history  12/09/2010    R [facility], Dr. Lin    Other surgical history Bilateral 06/04/2012    BREAST BX @ [facility]    Other surgical history Left     L knee replacement Dr. Dudley     Other surgical history      urethra enlarged    Other surgical history  2005    feeding tube inserted & removed several months later    Other surgical history      lymph node biopsy    Port, indwelling, imp      power port left upper chest    Sm intestinal image capsule  12/06/2011    Large superficial ulceration along the greater curvature with stigmata of recent bleeding, SB otherwise wnl    Tonsillectomy      Upper gi endoscopy,diagnosis  08/2011    Dr. Wesley, mild gastitis, bx + for H. pylori, duodenal bx negative for celiac disease    Upper gi endoscopy,diagnosis  04/30/2012    wnl, gastric bx benign and negative for H. pylori    Upper gi endoscopy,diagnosis  07/13/2017    mild esophageal ring, dilated 18-20 mm, proximal esophageal and gastric bx negative    Upper gi endoscopy,diagnosis  08/02/2018    minimal distal esophageal ring dilated 18-20 mm                Social History     Socioeconomic  History    Marital status:     Number of children: 1   Occupational History    Occupation: Retired   Tobacco Use    Smoking status: Former     Current packs/day: 0.00     Average packs/day: 1 pack/day for 1 year (1.0 ttl pk-yrs)     Types: Cigarettes     Start date: 1970     Quit date: 1971     Years since quittin.7    Smokeless tobacco: Never   Vaping Use    Vaping status: Never Used   Substance and Sexual Activity    Alcohol use: No    Drug use: No    Sexual activity: Not Currently     Partners: Male   Other Topics Concern     Service No    Sleep Concern Yes    Exercise No    Seat Belt Yes                                Physical Exam     ED Triage Vitals [25 1140]   /83   Pulse 58   Resp 16   Temp 98.5 °F (36.9 °C)   Temp src Temporal   SpO2 94 %   O2 Device None (Room air)       Current Vitals:   Vital Signs  BP: 148/83  Pulse: 62  Resp: 26  Temp: 98.5 °F (36.9 °C)  Temp src: Temporal    Oxygen Therapy  SpO2: 100 %  O2 Device: None (Room air)          Physical Exam  General: Alert and oriented x3 in no acute distress, very hoarse voice.  HEENT: Normocephalic, atraumatic, pupils equal round and reactive to light, oropharynx clear, uvula midline, right TM partially visualized and appears clear, moderate hard cerumen in the EAC.  Neck: Supple, mild diffuse tenderness in the anterior neck that patient states is related to chronic allodynia.  Cardiovascular: Regular rate and rhythm, no murmurs.  Respiratory: Lungs clear to auscultation.  Extremities: No CCE.  Skin: Warm and dry.        ED Course     Labs Reviewed   URINALYSIS WITH CULTURE REFLEX - Abnormal; Notable for the following components:       Result Value    Blood Urine Trace (*)     Protein Urine 30 (*)     Leukocyte Esterase Urine 250 (*)     RBC Urine 3-5 (*)     Squamous Epi. Cells Few (*)     All other components within normal limits   CBC WITH DIFFERENTIAL WITH PLATELET - Abnormal; Notable for the following  components:    HGB 11.3 (*)     HCT 34.7 (*)     All other components within normal limits   COMP METABOLIC PANEL (14) - Abnormal; Notable for the following components:    Glucose 102 (*)     Calculated Osmolality 296 (*)     All other components within normal limits   SED RATE, WESTERGREN (AUTOMATED) - Abnormal; Notable for the following components:    Sed Rate 55 (*)     All other components within normal limits   PROTHROMBIN TIME (PT) - Abnormal; Notable for the following components:    PT 21.4 (*)     INR 1.84 (*)     All other components within normal limits   C-REACTIVE PROTEIN - Abnormal; Notable for the following components:    C-Reactive Protein 2.50 (*)     All other components within normal limits   MONO QUAL, RFX TO EBV-VCA ON NEG - Normal   EBV, CHRONIC/ACTIVE INFECTION,IGG/IGM   URINE CULTURE, ROUTINE        Results            CT SOFT TISSUE OF NECK (CPT=70490)  Result Date: 5/13/2025  PROCEDURE:  CT SOFT TISSUE OF NECK (CPT=70490)  COMPARISON:  OSIRIS ANDREA, CT SOFT TISSUE OF NECK(CONTRAST ONLY) (CPT=70491), 11/29/2022, 2:21 PM.  INDICATIONS:  sore throat with hoarse voice x 4 days  TECHNIQUE:  Noncontrast CT scanning is performed through the neck soft tissues.  Dose reduction techniques were used. Dose information is transmitted to the ACR (American College of Radiology) NRDR (National Radiology Data Registry) which includes the Dose Index Registry.  PATIENT STATED HISTORY: (As transcribed by Technologist)   sore throat with hoarse voice x 4 days    FINDINGS:  Suboptimal examination given the lack of intravenous contrast.  PHARYNX/LARYNX:  Unremarkable non-contrast CT appearance.  ORAL CAVITY:  Unremarkable non-contrast CT appearance.  GLANDS:  The parotid glands demonstrate heterogeneous attenuation which is a nonspecific finding.  The submandibular glands are atrophic.  Postoperative changes from left-sided thyroidectomy.  Unremarkable non-contrast CT appearance of the right thyroid gland.  LYMPH  NODES/NECK:  No enlarged cervical lymph nodes are identified.   Left-sided chest port noted.  Scattered atelectasis at the lung apices.  Partially visualized postoperative changes at the left axilla.  Partially visualized postoperative changes of the right axilla.  On image 109, there is a 4 mm nodule within the right upper lobe.  Degenerative changes in the spine.             CONCLUSION:  1. Suboptimal examination given the lack of intravenous contrast. 2. No acute abnormality is seen on this noncontrast CT of the neck. 3. Bilateral submandibular gland atrophy.  Parotid glands are heterogeneous which is nonspecific. 4. There is a 4 mm nodule within the right upper lobe.    The findings include a single, incidentally detected, solid pulmonary nodule, measuring less than 6mm.  2017 guidelines from the Fleischner Society for the follow-up and management of incidentally detected indeterminate pulmonary nodules in persons at least 35 years of age depend on nodule size (average length and width) and underlying risk factors (including smoking and other risk factors). Please consider the following recommendations after clinical assessment of risk factors.  For <6mm solid nodules: In low risk patients, no follow-up required.  If suspicious morphology or upper lobe location, consider 12 month follow-up.  In high risk patients, optional CT in 12 months.   LOCATION:  [facility]    Dictated by (CST): Stromberg, LeRoy, MD on 5/13/2025 at 3:02 PM     Finalized by (CST): Stromberg, LeRoy, MD on 5/13/2025 at 3:11 PM       Medications   heparin (Porcine) 100 Units/mL lock flush 500 Units (has no administration in time range)   dexAMETHasone PF (Decadron) 10 MG/ML injection 10 mg (10 mg Intravenous Given 5/13/25 1306)   sodium chloride 0.9 % IV bolus 1,000 mL (0 mL Intravenous Stopped 5/13/25 1636)   carbamide peroxide (Ear Drops Earwax Aid) 6.5 % otic solution 5 drop (5 drops Right Ear Given 5/13/25 1408)   Lidocaine Viscous HCl  (XYLOCAINE) 2 % mouth solution 10 mL (10 mL Mouth/Throat Given 5/13/25 2101)     The patient was given Decadron and started on IV fluids.  Her right ear was irrigated for partial cerumen impaction.  She felt like the pain decreased slightly after the Decadron and was given viscous lidocaine additionally with some further improvement.                  MDM      Patient presents with throat pain and difficulty swallowing.  Differential diagnosis includes but is not limited to viral laryngitis and retropharyngeal abscess.  The patient does not have any evidence of abscess on CT of the neck.  She does not have a fever or leukocytosis.  She has stable hemoglobin.  She does have mild elevation in her ESR and CRP.  The patient I think still likely has a viral etiology to her symptoms.  Hopefully the steroids will continue to help her in decreasing inflammation and pain.  She would like to try the viscous lidocaine at home as well.  She is not having any respiratory difficulty and has normal oxygen saturations.  She was encouraged to follow-up with her ENT.  She will return here if her condition worsens.        Medical Decision Making      Disposition and Plan     Clinical Impression:  1. Acute laryngitis         Disposition:  Discharge  5/13/2025  4:33 pm    Follow-up:  Benson Welch MD  [address] [place] [address] [phone]    Schedule an appointment as soon as possible for a visit in 1 day(s)            Medications Prescribed:  Current Discharge Medication List        START taking these medications    Details   Lidocaine Viscous HCl 2 % Mouth/Throat Solution Take 5 mL by mouth every 3 (three) hours as needed for Pain.  Qty: 1 each, Refills: 0                   Supplementary Documentation:

## 2025-05-14 LAB
EBV NA IGG SER QL IA: NEGATIVE
EBV VCA IGG SER QL IA: POSITIVE
EBV VCA IGM SER QL IA: NEGATIVE

## (undated) DIAGNOSIS — R80.9 PROTEINURIA: Primary | ICD-10-CM

## (undated) DEVICE — 2C14 #2 PDO 45 X 45: Brand: 2C14 #2 PDO 45 X 45

## (undated) DEVICE — Device: Brand: DEFENDO AIR/WATER/SUCTION AND BIOPSY VALVE

## (undated) DEVICE — ENDOSCOPY PACK UPPER: Brand: MEDLINE INDUSTRIES, INC.

## (undated) DEVICE — 1200CC GUARDIAN II: Brand: GUARDIAN

## (undated) DEVICE — ENDOSCOPY PACK - LOWER: Brand: MEDLINE INDUSTRIES, INC.

## (undated) DEVICE — SUTURE VICRYL 0 CP-1

## (undated) DEVICE — FILTERLINE NASAL ADULT O2/CO2

## (undated) DEVICE — 3M™ RED DOT™ MONITORING ELECTRODE WITH FOAM TAPE AND STICKY GEL, 50/BAG, 20/CASE, 72/PLT 2570: Brand: RED DOT™

## (undated) DEVICE — STERILE POLYISOPRENE POWDER-FREE SURGICAL GLOVES: Brand: PROTEXIS

## (undated) DEVICE — GOWN,SIRUS,FABRIC-REINFORCED,LARGE: Brand: MEDLINE

## (undated) DEVICE — SUTURE ETHIBOND 1 OS-6

## (undated) DEVICE — Device: Brand: STABLECUT®

## (undated) DEVICE — DRESSING AQUACEL AG 3.5X12

## (undated) DEVICE — STOCKINETTE HYDROMED 8X6

## (undated) DEVICE — WRAP COOLING KNEE W/ICE PILLOW

## (undated) DEVICE — SYRINGE/GUAGE ASSEMBLY

## (undated) DEVICE — SOL  .9 1000ML BTL

## (undated) DEVICE — BOWL CEMENT MIX QUICK-VAC

## (undated) DEVICE — GLOVE SURG SENSICARE SZ 8-1/2

## (undated) DEVICE — ZIMMER® STERILE DISPOSABLE TOURNIQUET CUFF WITH PLC, DUAL PORT, SINGLE BLADDER, 34 IN. (86 CM)

## (undated) DEVICE — FORCEP RADIAL JAW 4

## (undated) DEVICE — CATH BALLOON CRE 18-20MM 5838

## (undated) DEVICE — KENDALL SCD EXPRESS SLEEVES, KNEE LENGTH, MEDIUM: Brand: KENDALL SCD

## (undated) DEVICE — CHLORAPREP 26ML APPLICATOR

## (undated) DEVICE — SOL  .9 3000ML

## (undated) DEVICE — TOTAL KNEE CDS: Brand: MEDLINE INDUSTRIES, INC.

## (undated) DEVICE — SUTURE VICRYL 2-0 FSL

## (undated) NOTE — IP AVS SNAPSHOT
Patient Demographics     Address  [address] [place] [address] Phone  [phone] (Home) *Preferred* E-mail Address  [email or web address]      Emergency Contact(s)     Name Relation Home Work Hayden 10 spouse [phone]        A Ana Hensley MD. Call in 1 week.     Specialty:  Internal Medicine  Why:  when d/c from rehab  Contact information:  [place] ,[address] Daphney Rodriguez MD. Schedule an appointme Take 75 mg by mouth daily. Mycophenolate Mofetil 500 MG Tabs  Commonly known as:  CELLCEPT      Take 3 tablets (1,500 mg total) by mouth 2 (two) times daily.    Alvina Ibarra MD         Polyethylene Glycol 3350 Powd  Commonly known as:  GLYCOLAX [id number] Potassium Citrate ER (UROCIT-K) SR tab 10 mEq 12/01/17 0859 Given      [id number] Potassium Citrate ER (UROCIT-K) SR tab 10 mEq 12/01/17 1606 Given      [id number] TraMADol HCl (ULTRAM) tab 50 mg 11/30/17 2121 Given      [id number] TraMADol HCl (UL Blood — 12/01/17 1410          Components    Component Value Reference Range Flag Lab   HGB 8.2 12.0 - 16.0 g/dL [facility]            CBC WITH DIFFERENTIAL WITH PLATELET [[id number]]  Resulted: 12/01/17 0644, Result status: Final result   Ordering provid MRSA Culture Only Once [[id number]] Collected:  11/30/17 1856    Order Status:  Sent Lab Status:   In process Updated:  11/30/17 1856    Specimen:  Other from [facility]          H&P - H&P Note      H&P signed by Aurora Henderson MD at 11/30/2017  8:13 PM  Drea Past Medical History:   Diagnosis Date   • Anemia, unspecified    • Anesthesia complication     difficulty urinating after general anesthesia   • Blind     legally blind   • Blood disorder     sickle cell trait   • Breast cancer ([facility] [address] 1991    breast, right well-developed and well-nourished. HENT:   Head: Normocephalic and atraumatic.    Right Ear: External ear normal.   Left Ear: External ear normal.   Nose: Nose normal.   Mouth/Throat: Oropharynx is clear and moist.   Eyes: Conjunctivae and EOM are normal. knee site is not severe and there is no overt gi bleeding at this time. Miko Salinas  11/30/2017[KV.1]    Electronically signed by Len Olmedo MD on 11/30/2017  8:13 PM   Attribution Key    KV. 1 - Len Olmedo MD on 11/30/2017  8:02 PM treated with negative stool assay 12/11   • High blood pressure    • History of blood transfusion    • Hypercoagulable state ([facility] [address]    • Muscle weakness     right sided weakness   • Neuropathy     hands and feet   • Osteoarthritis     generalized   • Prabhakar curvature with stigmata of recent bleeding, SB                otherwise wnl  No date: TONSILLECTOMY  8/11: UPPER GI ENDOSCOPY,DIAGNOSIS      Comment: Dr. Mona Husain, mild gastitis, bx + for H.                pylori, duodenal bx negative for jovan -   Sitting down on and standing up from a chair with arms (e.g., wheelchair, bedside commode, etc.): A Little   -   Moving from lying on back to sitting on the side of the bed?: A Little   How much help from another person does the patient currently need. overall the evaluation complexity is considered moderate. The AM-PAC '6-Clicks' Inpatient Basic Mobility Short Form was completed and this patient is demonstrating a 46.58% degree of impairment in mobility.  Research supports that patients with this level of Deferral:  Allergy to Vaccine or Component Z28.04     INFLUENZA defer-11/22/16     Deferral:  Patient Refused     INFLUENZA defer-06/02/16     Deferral:  Allergy to Vaccine or Component Z28.04     INFLUENZA defer-11/10/15     Deferral:  Patient Refused

## (undated) NOTE — IP AVS SNAPSHOT
[address]            (For Outpatient Use Only) Initial Admit Date: 11/30/2017   Inpt/Obs Admit Date: Inpt: 11/30/17 / Obs: N/A   Discharge Date:    Nikky Mendoza:  [de-identified]   MRN: [de-identified]   CSN: [id number]        [facility] Hospital Account Financial Class: Medicare Advantage    December 1, 2017

## (undated) NOTE — LETTER
[facility]  [address] [facility], [address] [place] [address]    Consent for Operation    Date: __________________    Time: _______________    1.  I authorize the performance upon Tete Agarwal the following operation:    Procedure(s):  COLONOSCOPY, Eso procedure has been videotaped, the surgeon will obtain the original videotape. The hospital will not be responsible for storage or maintenance of this tape.     6. For the purpose of advancing medical education, I consent to the admittance of observers to t STATEMENTS REQUIRING INSERTION OR COMPLETION WERE FILLED IN.     Signature of Patient:   ___________________________    When the patient is a minor or mentally incompetent to give consent:  Signature of person authorized to consent for patient: ____________ supplements, and pills I can buy without a prescription (including street drugs/illegal medications). Failure to inform my anesthesiologist about these medicines may increase my risk of anesthetic complications.   · If I am allergic to anything or have had Anesthesiologist Signature     Date   Time  I have discussed the procedure and information above with the patient (or patient’s representative) and answered their questions. The patient or their representative has agreed to have anesthesia services.     ___

## (undated) NOTE — LETTER
[facility]  Phone: [phone]  Emily 57 By:  Bobby Ruelas RN Date: 10/17/17    Patient Name: Olimpia Nipper  Surgery Date: 2017    CSN: [id number]  Medical Record: [id number]   : 21

## (undated) NOTE — LETTER
[facility]  [address] [facility], [address] [place] [address]    Consent for Operation    Date: __________________    Time: _______________    1.  I authorize the performance upon Zahida Gilbert the following operation:    Procedure(s):  LEFT TOTAL KNEE procedure has been videotaped, the surgeon will obtain the original videotape. The hospital will not be responsible for storage or maintenance of this tape.     6. For the purpose of advancing medical education, I consent to the admittance of observers to t STATEMENTS REQUIRING INSERTION OR COMPLETION WERE FILLED IN.     Signature of Patient:   ___________________________    When the patient is a minor or mentally incompetent to give consent:  Signature of person authorized to consent for patient: ____________ supplements, and pills I can buy without a prescription (including street drugs/illegal medications). Failure to inform my anesthesiologist about these medicines may increase my risk of anesthetic complications.   · If I am allergic to anything or have had Anesthesiologist Signature     Date   Time  I have discussed the procedure and information above with the patient (or patient’s representative) and answered their questions. The patient or their representative has agreed to have anesthesia services.     ___

## (undated) NOTE — LETTER
7/17/2017          [address]    Dear Verenice Chand,       Here are the biopsy/pathology results from your recent EGD (Upper Intestinal Endoscopy):     The biopsies taken of the esophagus were benign and showed no

## (undated) NOTE — IP AVS SNAPSHOT
Patient Demographics     Address  [address] [place] [address] Phone  [phone] (Home) *Preferred* E-mail Address  [email or web address]      Emergency Contact(s)     Name Relation Home Work Hayden [address] [facility] [phone]        A ? No tub baths, pools, or saunas until cleared by surgeon (about 4-6 weeks because it takes that long for the incision on the skin to heal and be a barrier to prevent infection). ?  When allowed to shower:    o AQUACEL dressing is waterproof and does not r surgeon. Have knee bent when changing dressing for more ease of bending afterwards. ? There could be a small amount of redness around the staples or incision; this is normal.  ?  Watch for increased redness, warmth, any odor, increased drainage or opening take. Use plain Tylenol (acetaminophen) for less severe pain. ? Some pain medications have Tylenol (acetaminophen) in them such as Norco and Percocet. Do NOT take Tylenol (acetaminophen) within 4 hours of a dose of these medications. ?  Apply ice or cold ? Eat a balanced diet high in fiber and drink plenty of fluids. ? Continue using incentive spirometry because narcotics make you sleepy so you may not take good deep breaths. We do not want you to get pneumonia. Post op Office visits  ?  Schedule 10 d prevent stiffness. You may need to do this any time you travel for the first year after surgery.   ? If traveling by plane, BEFORE you get into a security line, let them know that you had your hip replaced, as you will most likely set off the metal detecto [address] [facility] [address] [phone]                  Your medication list      TAKE these medications       Instructions Authorizing Provider Morning Afternoon Evening As Needed   B COMPLEX OR      Take by mouth daily.           BENEFIBER OR      Take by mout Madeline Taylor MD         lidocaine-prilocaine 2.5-2.5 % Crea  Commonly known as:  [facility]      APPLY TO AFFECTED AREA AS NEEDED 15 MINUTES PRIOR TO Berkley Lara MD         Metoprolol Succinate ER 50 MG Tb24  Commonly known as:  T Delia Cabrales [phone], [phone]  [address], [address] [place] [address]    Phone:  [phone]   hydrALAzine HCl 50 MG Tabs  Warfarin Sodium 4 MG Tabs     Please  your prescriptions at the location directed by your doctor or nurse    Bring [id number] hydrALAzine HCl (APRESOLINE) tab 50 mg 11/26/17 2117 Given      [id number] hydrALAzine HCl (APRESOLINE) tab 50 mg 11/27/17 0528 Given      [id number] hydrALAzine HCl (APRESOLINE) tab 50 mg 11/27/17 1620 Given            LEFT LOWER ABDOMEN     Orde 4871, Result status: Final result   Ordering provider:  DOMINIK Marie  11/26/17 2415 Resulting lab:  [facility]    Specimen Information    Type Source Collected On   Blood — 11/27/17 0541          Components    Component Value Reference Range Flag Lab receiving this procedure. We will proceed with procedure as planned.[VW.1]      Electronically signed by Dustin High MD on 11/24/2017  9:27 AM   Attribution Key    VW. 1 - DOMINIK Rasmey on 11/24/2017  6:33 AM                        Consults - MD IRELAND Support System and Family Circumstances (i.e., potential caregivers): spouse / significant other  Home Environment / Accessibility: split level / walkout  Current Functional Status:  Max A 50 ft      Past Medical History:  @Medical hx@  Past Medical History Comment: Performed by Padmini Ashley at [address]  No date: HYSTERECTOMY  No date: LAPAROSCOPY,DIAGNOSTIC  3176,7216: FREDDIE NEEDLE LOCALIZATION W/ SPECIMEN 1 SITE LEFT  No date: MASTEC,MOD RADICAL      Comment: 1991 for R b lactated ringers infusion  Intravenous Continuous   [COMPLETED] OxyCODONE HCl ER (OXYCONTIN) 12 hr tab 10 mg 10 mg Oral On Call   [COMPLETED] Tranexamic Acid (CYKLOKAPRON) 3,000 mg in sodium chloride 0.9 % 100 mL IRRIGATION ONLY (NOT FOR IV USE) 3,000 mg I HYDROmorphone HCl PF (DILAUDID) 1 MG/ML injection 0.4 mg 0.4 mg Intravenous Q2H PRN   Or      HYDROmorphone HCl PF (DILAUDID) 1 MG/ML injection 0.8 mg 0.8 mg Intravenous Q2H PRN   [COMPLETED] OxyCODONE HCl ER (OXYCONTIN) 12 hr tab 10 mg 10 mg Oral [email or web address] Comment:Vomiting severe swelling at injection site  Tetracyclines & Rel*        Comment:Throat closes/ swells, rash  Vancomycin              Rash          Lab Results  Component Value Date   WBC 16.9 11/26/2017   HGB 11.7 11/26/2017   HCT 34.8 11/26/2017 Neuro: CNII-XII are grossly intact. Sensation to dull touch intact in all extremities and reflexes are 2+, bilateral and symmetric for biceps, brachioradialis, triceps, patella and achilles throughout. Babinski negative bilaterally.   Spear's negative sneha Attribution Key    DD. 1 - Inga Eduardo MD on 11/26/2017 12:02 PM                     D/C Summary     No notes of this type exist for this encounter.          Physical Therapy Notes (last 72 hours) (Notes from 11/24/2017  5:29 PM through 11/27/2017  5:2 • Personal history of antineoplastic chemotherapy 1991   • Problems with swallowing    • Scleroderma (HCC)    • Seizure disorder (HCC)     hx of one incident several years ago   • Sickle cell trait ([facility]    • [facility]    • Stroke ([facility]     \ Comment: wnl, gastric bx benign and negative for H.                pylori  07/13/2017: UPPER GI ENDOSCOPY,DIAGNOSIS      Comment: mild esophageal ring, dilated 18-20 mm,                proximal esophageal and gastric bx taken    SUBJECTIVE  \"I just ha Pattern: L Decreased stance time  Stoop/Curb Assistance: Not tested  Comment : above based on fim def    Skilled Therapy Provided:   AM: The pt seen in group for exercises and mobility training.  Pt performed seated and standing exercises per protocol with DISCHARGE RECOMMENDATIONS  PT Discharge Recommendations: Sub-acute rehabilitation (Per notes, pt was not accepted at acute rehab. )     PLAN  PT Treatment Plan: Bed mobility; Body mechanics; Endurance; Energy conservation; Family education;Gait training;Neurom • Blood disorder     sickle cell trait   • Breast cancer ([facility] 1991    breast, right   • Calculus of kidney    • Cancer (HCC)    • Decreased vision    • Deep vein thrombosis (HCC)     blood clots in mouth after dental procedure   • Dysuria     chronic dysu Comment: urethra dilated  1980: OTHER Left      Comment: Biopsy for scleroderma  12/9/10: OTHER SURGICAL HISTORY      Comment: R MJ, Dr. Lucian Schmitz  6/4/12: OTHER SURGICAL HISTORY Bilateral      Comment: BREAST BX @ [facility]  No date: PORT, INDWELLING, IMP blankets)?: A Little   -   Sitting down on and standing up from a chair with arms (e.g., wheelchair, bedside commode, etc.): A Lot   -   Moving from lying on back to sitting on the side of the bed?: A Little   How much help from another person does the pat L Knee Extension (degrees): -14 PROM      Patient End of Session: In bed;Up in chair;Call light within reach; Needs met;RN aware of session/findings; All patient questions and concerns addressed    ASSESSMENT   Pt demonstrated similar impairments in knee ran Goal #4  Patient will negotiate 4 stairs/one curb w/ assistive device and supervision    Goal #5  AROM 0 degrees extension to 95 degrees flexion      Goal #6        Goal Comments: Goals established on 11/24/2017[AJ.1]     Attribution Key    JOE - Holly hx of one incident several years ago   • [facility]    • Sjogren's disease ([facility]    • Stroke ([facility]     \"stroke-like symptoms\"-no residual effects; no diagnostic findings several years ago   • Systemic lupus ([facility]     SLE   • Visual impairme Comment: mild esophageal ring, dilated 18-20 mm,                proximal esophageal and gastric bx taken    SUBJECTIVE  \"i'm going to do it, there is just a lot of pain. \" Pt received pain medication per RN prior to session    Patient’s self-stated go Pattern: L Decreased stance time;L Flexed knee; Shuffle (forward flexed posture)  Stoop/Curb Assistance: Not tested  Comment : above based on fim def    Skilled Therapy Provided:   AM session: Patient found sitting in chair and instructed in 10 repetitions activity tolerance, balance, and improved gait mechanics since initial evaluation.  She demonstrated good progress towards transfer, range of motion, and ambulation goals below, though was unable to initiate stair training 2/2 report of fatigue, pain, and n Occupational Therapy Note signed by Madelyn Salinas OT at 11/26/2017  2:29 PM  Version 1 of 1    Author:  Madelyn Salinas OT Service:  Rehab Author Type:  Occupational Therapist    Filed:  11/26/2017  2:29 PM Date of Service:  11/26/2017  2:23 PM Statu • Sickle cell trait ([facility]    • Sjogren's disease ([facility]    • Stroke ([facility]     \"stroke-like symptoms\"-no residual effects; no diagnostic findings several years ago   • Systemic lupus ([facility]     SLE   • Visual impairment     glasses   • Vitamin D deficiency[ proximal esophageal and gastric bx taken[KP. 2]    SUBJECTIVE  Pt sttaed, \"I am just dizzy. \"    Patient self-stated goal is to go home    OBJECTIVE[KP.1]  Precautions: Knee immobilizer; Low vision;Limb alert - right (legally blind)[KP.2]    WE reach;RN aware of session/findings; All patient questions and concerns addressed[KP. 2]    ASSESSMENT   Patient is a[KP.3 74year old[KP.3] female admitted 11/24/2017 for L TKA.   In OT session 1 of 5 patient is progressing with the following impairments: de :  Evangelista Tobar OT (Occupational Therapist)    Related Notes:  Original Note by Evangelista Tobar OT (Occupational Therapist) filed at 11/25/2017  8:24 AM       OCCUPATIONAL THERAPY EVALUATION - INPATIENT     Room Number: 379/379-A  Evaluation Date: 11/2 \"stroke-like symptoms\"-no residual effects; no diagnostic findings several years ago   • Systemic lupus ([facility]     SLE   • Visual impairment     glasses   • Vitamin D deficiency[MR.2]        Past Surgical History[MR.1]  Past Surgical History:  No date: A OCCUPATIONAL PROFILE    HOME SITUATION[MR.1]  Type of Home: House  Home Layout: Two level  Lives With: Spouse    Toilet and Equipment: Standard height toilet ( getting RTS with handles)  Shower/Tub and Equipment: Shower chair;Tub-shower combo  Other Slight deficits d/t tremors in RUE   Fine Motor   Slight deficits d/t tremors in RUE     ADDITIONAL TESTS       Modified Barthel Index score of 10/20; <15 usually indicates moderate disability; <10 usually indicates severe disability in ADL dysfunction in increased time, max cueing to avoid premature sitting), toileting (recommend use of BSC at this time, pt with BUE on rw at all times so pt would need help with pericare and clothing management), grooming (oral hygiene, s/u in chair), LB Dress (donned L soc high PLOF and has multiple deficits d/t complex medical history.       Patient Complexity  Occupational Profile/Medical History MODERATE - Expanded review of history including review of medical or therapy record   Specific performance deficits impacting eng Room Number: 379/379-A  Evaluation Date: 11/25/2017  Type of Evaluation: Initial       Physician Order: IP Consult to Occupational Therapy  Reason for Therapy: ADL/IADL Dysfunction and Discharge Planning    History related to current admission: Pt is 74 ye No date: ARTHROSCOPY OF JOINT UNLISTED Left      Comment: knee  1991: CHEMOTHERAPY  8/8/11: COLONOSCOPY,DIAGNOSTIC      Comment: Dr. Ivana Son, wnl  12/9/2010: CYSTOURETHROSCOPY      Comment: Performed by Regina Samuel at [place] [id number] Prior Level of Function: Pt was mod ind in all ADLs. Pt used a quad cane at baseline. Pt reports full body pain at 8.2 at baseline/chronic d/t lupus.       SUBJECTIVE   \"I was hoping to have a full sponge bath\"    Patient self-stated goal is less pain i NEUROLOGICAL FINDINGS  Neurological Findings: None                ACTIVITY TOLERANCE   O2 Saturation: 100%  Room air  Heart Rate: 80  Blood Pressure: 144/63    ACTIVITIES OF DAILY LIVING ASSESSMENT  AM-PAC ‘6-Clicks’ Inpatient Daily Activity Short Form  Ho reach; All patient questions and concerns addressed    ASSESSMENT     Patient is a 74year old female admitted on 11/24/2017 for L TKA. Complete medical history and occupational profile noted above.  Functional outcome measures completed include: AM- PAC, pt detailed assessments, several treatment options    Overall Complexity MODERATE     OT Discharge Recommendations: Acute rehabilitation  OT Device Recommendations: TBD    PLAN  OT Treatment Plan: Balance activities; ADL training;Functional transfer training;E Provider Natalia Hall    12/5/2017 11:00 AM Aiyana Thurston, PT [facility] PHYSICAL THERAPY, [facility]    12/7/2017 12:00 PM Aiyana Thurston, PT [facility] PHYSICAL THERAPY, [facility]    12/14/2017

## (undated) NOTE — LETTER
[facility]  [address] [facility], [address] [place] [address]    Consent for Operation    Date: __________________    Time: _______________    1.  I authorize the performance upon Zahida Gilbert the following operation:    Procedure(s):  LEFT TOTAL KNEE procedure has been videotaped, the surgeon will obtain the original videotape. The hospital will not be responsible for storage or maintenance of this tape.     6. For the purpose of advancing medical education, I consent to the admittance of observers to t STATEMENTS REQUIRING INSERTION OR COMPLETION WERE FILLED IN.     Signature of Patient:   ___________________________    When the patient is a minor or mentally incompetent to give consent:  Signature of person authorized to consent for patient: ____________ supplements, and pills I can buy without a prescription (including street drugs/illegal medications). Failure to inform my anesthesiologist about these medicines may increase my risk of anesthetic complications.   · If I am allergic to anything or have had Anesthesiologist Signature     Date   Time  I have discussed the procedure and information above with the patient (or patient’s representative) and answered their questions. The patient or their representative has agreed to have anesthesia services.     ___